# Patient Record
Sex: FEMALE | Race: OTHER | Employment: OTHER
[De-identification: names, ages, dates, MRNs, and addresses within clinical notes are randomized per-mention and may not be internally consistent; named-entity substitution may affect disease eponyms.]

---

## 2017-01-03 ENCOUNTER — OFFICE VISIT (OUTPATIENT)
Dept: FAMILY MEDICINE CLINIC | Facility: CLINIC | Age: 67
End: 2017-01-03

## 2017-01-03 VITALS
SYSTOLIC BLOOD PRESSURE: 144 MMHG | BODY MASS INDEX: 26.7 KG/M2 | OXYGEN SATURATION: 98 % | HEART RATE: 72 BPM | TEMPERATURE: 96.9 F | DIASTOLIC BLOOD PRESSURE: 88 MMHG | WEIGHT: 146 LBS

## 2017-01-03 DIAGNOSIS — F17.200 SMOKING: ICD-10-CM

## 2017-01-03 DIAGNOSIS — H91.91 HEARING PROBLEM, RIGHT: Primary | ICD-10-CM

## 2017-01-03 DIAGNOSIS — Z23 NEED FOR VACCINATION: ICD-10-CM

## 2017-01-03 DIAGNOSIS — I10 ESSENTIAL HYPERTENSION: ICD-10-CM

## 2017-01-03 DIAGNOSIS — J32.9 SINUSITIS, UNSPECIFIED CHRONICITY, UNSPECIFIED LOCATION: ICD-10-CM

## 2017-01-03 PROBLEM — H91.90 HEARING PROBLEM: Status: ACTIVE | Noted: 2017-01-03

## 2017-01-03 PROCEDURE — 99214 OFFICE O/P EST MOD 30 MIN: CPT | Performed by: FAMILY MEDICINE

## 2017-01-03 PROCEDURE — 90688 IIV4 VACCINE SPLT 0.5 ML IM: CPT | Performed by: FAMILY MEDICINE

## 2017-01-03 PROCEDURE — G0008 ADMIN INFLUENZA VIRUS VAC: HCPCS | Performed by: FAMILY MEDICINE

## 2017-01-03 PROCEDURE — 90670 PCV13 VACCINE IM: CPT | Performed by: FAMILY MEDICINE

## 2017-01-03 PROCEDURE — G0009 ADMIN PNEUMOCOCCAL VACCINE: HCPCS | Performed by: FAMILY MEDICINE

## 2017-01-03 RX ORDER — LORATADINE ORAL 5 MG/5ML
SOLUTION ORAL
Refills: 0 | COMMUNITY
Start: 2016-12-20 | End: 2017-01-03 | Stop reason: ALTCHOICE

## 2017-01-03 RX ORDER — AMLODIPINE BESYLATE 5 MG/1
5 TABLET ORAL DAILY
Qty: 30 TABLET | Refills: 3 | Status: SHIPPED | OUTPATIENT
Start: 2017-01-03 | End: 2017-06-19 | Stop reason: SDUPTHER

## 2017-02-14 ENCOUNTER — OFFICE VISIT (OUTPATIENT)
Dept: FAMILY MEDICINE CLINIC | Facility: CLINIC | Age: 67
End: 2017-02-14

## 2017-02-14 VITALS
TEMPERATURE: 97.5 F | WEIGHT: 144 LBS | HEIGHT: 62 IN | RESPIRATION RATE: 20 BRPM | HEART RATE: 76 BPM | SYSTOLIC BLOOD PRESSURE: 122 MMHG | DIASTOLIC BLOOD PRESSURE: 78 MMHG | BODY MASS INDEX: 26.5 KG/M2

## 2017-02-14 DIAGNOSIS — J32.9 SINUSITIS, UNSPECIFIED CHRONICITY, UNSPECIFIED LOCATION: ICD-10-CM

## 2017-02-14 DIAGNOSIS — F17.200 SMOKING: ICD-10-CM

## 2017-02-14 DIAGNOSIS — Z23 NEED FOR VACCINATION: ICD-10-CM

## 2017-02-14 DIAGNOSIS — I10 ESSENTIAL HYPERTENSION: Primary | ICD-10-CM

## 2017-02-14 PROCEDURE — 99214 OFFICE O/P EST MOD 30 MIN: CPT | Performed by: FAMILY MEDICINE

## 2017-02-14 RX ORDER — NICOTINE 21 MG/24HR
1 PATCH, TRANSDERMAL 24 HOURS TRANSDERMAL EVERY 24 HOURS
Qty: 30 PATCH | Refills: 3 | Status: SHIPPED | OUTPATIENT
Start: 2017-02-14 | End: 2018-04-04

## 2017-03-06 ENCOUNTER — HOSPITAL ENCOUNTER (OUTPATIENT)
Dept: CT IMAGING | Age: 67
Discharge: HOME | End: 2017-03-06
Payer: MEDICARE

## 2017-03-06 DIAGNOSIS — J30.9 ALLERGIC RHINITIS, CAUSE UNSPECIFIED: ICD-10-CM

## 2017-03-06 DIAGNOSIS — J32.4 CHRONIC PANSINUSITIS: ICD-10-CM

## 2017-03-06 DIAGNOSIS — J34.3 HYPERTROPHY OF BOTH INFERIOR NASAL TURBINATES: ICD-10-CM

## 2017-03-06 PROCEDURE — 70486 CT MAXILLOFACIAL W/O DYE: CPT

## 2017-06-19 DIAGNOSIS — I10 ESSENTIAL HYPERTENSION: ICD-10-CM

## 2017-06-19 RX ORDER — AMLODIPINE BESYLATE 5 MG/1
TABLET ORAL
Qty: 30 TABLET | Refills: 2 | Status: SHIPPED | OUTPATIENT
Start: 2017-06-19 | End: 2017-09-11 | Stop reason: SDUPTHER

## 2017-09-11 DIAGNOSIS — I10 ESSENTIAL HYPERTENSION: ICD-10-CM

## 2017-09-12 RX ORDER — AMLODIPINE BESYLATE 5 MG/1
TABLET ORAL
Qty: 30 TABLET | Refills: 1 | Status: SHIPPED | OUTPATIENT
Start: 2017-09-12 | End: 2017-11-10 | Stop reason: SDUPTHER

## 2017-11-10 DIAGNOSIS — I10 ESSENTIAL HYPERTENSION: ICD-10-CM

## 2017-11-10 RX ORDER — AMLODIPINE BESYLATE 5 MG/1
TABLET ORAL
Qty: 30 TABLET | Refills: 0 | Status: SHIPPED | OUTPATIENT
Start: 2017-11-10 | End: 2017-12-14 | Stop reason: SDUPTHER

## 2017-11-10 NOTE — TELEPHONE ENCOUNTER
Please Approve or Refuse.        Next Visit Date:  Visit date not found    Hemoglobin A1C (%)   Date Value   12/19/2016 5.3             ( goal A1C is < 7)   No results found for: LABMICR  LDL Cholesterol (mg/dL)   Date Value   12/19/2016 79       (goal LDL is <100)   BUN (mg/dL)   Date Value   05/11/2014 14     BP Readings from Last 3 Encounters:   02/14/17 122/78   01/03/17 (!) 144/88   12/20/16 (!) 158/79          (goal 120/80)        Patient Active Problem List:     Sinusitis     Essential hypertension     Hearing problem     Smoking

## 2017-11-12 ENCOUNTER — HOSPITAL ENCOUNTER (EMERGENCY)
Age: 67
Discharge: HOME | End: 2017-11-12
Attending: EMERGENCY MEDICINE
Payer: MEDICARE

## 2017-11-12 VITALS
DIASTOLIC BLOOD PRESSURE: 88 MMHG | RESPIRATION RATE: 16 BRPM | TEMPERATURE: 98.3 F | OXYGEN SATURATION: 99 % | HEART RATE: 77 BPM | SYSTOLIC BLOOD PRESSURE: 167 MMHG | WEIGHT: 145 LBS | BODY MASS INDEX: 26.68 KG/M2 | HEIGHT: 62 IN

## 2017-11-12 DIAGNOSIS — K08.89 PAIN, DENTAL: Primary | ICD-10-CM

## 2017-11-12 PROCEDURE — 99282 EMERGENCY DEPT VISIT SF MDM: CPT

## 2017-11-12 RX ORDER — IBUPROFEN 800 MG/1
800 TABLET ORAL EVERY 6 HOURS PRN
Qty: 20 TABLET | Refills: 0 | Status: SHIPPED | OUTPATIENT
Start: 2017-11-12 | End: 2021-11-02 | Stop reason: SDUPTHER

## 2017-11-12 RX ORDER — TRAMADOL HYDROCHLORIDE 50 MG/1
50 TABLET ORAL EVERY 6 HOURS PRN
Qty: 10 TABLET | Refills: 0 | Status: SHIPPED | OUTPATIENT
Start: 2017-11-12 | End: 2018-04-04 | Stop reason: ALTCHOICE

## 2017-11-12 RX ORDER — CLINDAMYCIN HYDROCHLORIDE 300 MG/1
300 CAPSULE ORAL 3 TIMES DAILY
Qty: 30 CAPSULE | Refills: 0 | Status: SHIPPED | OUTPATIENT
Start: 2017-11-12 | End: 2017-11-22

## 2017-11-12 ASSESSMENT — PAIN SCALES - GENERAL: PAINLEVEL_OUTOF10: 10

## 2017-11-12 ASSESSMENT — PAIN DESCRIPTION - LOCATION: LOCATION: MOUTH

## 2017-11-12 ASSESSMENT — PAIN DESCRIPTION - PAIN TYPE: TYPE: ACUTE PAIN

## 2017-11-12 NOTE — ED PROVIDER NOTES
[address]  eMERGENCY dEPARTMENT eNCOUnter      Pt Name: Pancho Griggs  MRN: [id number]  [place] 1950  Date of evaluation: 11/12/17      CHIEF COMPLAINT:   Chief Complaint   Patient presents with    Dental Pain     HISTORY OF PRESENT ILLNESS    Pancho Griggs is a 69 y.o. female who presents with complaints of left upper dental pain. Pt states in august she was told by a dentist she needed a root canal.  States she could not afford to have it done. Reports pain went away and returned on Friday. Pain has gradually worsened. States her left cheek became swollen last night. Admits to some associated nausea. Denies fever, chills, trouble breathing or swallowing, cp, sob, vomiting, abd pain. No other complaints. REVIEW OF SYSTEMS     Constitutional: Denies recent fever, chills. Eyes: No visual changes. Neck: No neck pain. Respiratory: Denies recent shortness of breath. Cardiac:  Denies recent chest pain. GI: denies any recent abdominal pain nausea or vomiting. Denies Blood in the stool or black tarry stools. : denies dysuria. Musculoskeletal: Denies focal weakness. Neurologic: denies headache or focal weakness. Skin:  Denies any rash. Negative in 10 essential Systems except as mentioned above and in the HPI. PAST MEDICAL HISTORY   PMH:  has a past medical history of Spondylolisthesis at L5-S1 level. none otherwise stated in nurses notes  Surgical History:  has a past surgical history that includes back surgery and Hysterectomy. none otherwise stated in nurses notes  Social History:  reports that she has been smoking. She does not have any smokeless tobacco history on file. She reports that she does not drink alcohol or use drugs. none otherwise stated in nurses notes  Family History: none otherwise stated in nurses notes  Psychiatric History: none otherwise stated in nurses notes    Allergies:is allergic to penicillins.       PHYSICAL EXAM     INITIAL

## 2018-04-04 ENCOUNTER — HOSPITAL ENCOUNTER (OUTPATIENT)
Dept: WOMENS IMAGING | Age: 68
Discharge: HOME | End: 2018-04-06
Payer: MEDICARE

## 2018-04-04 ENCOUNTER — HOSPITAL ENCOUNTER (OUTPATIENT)
Age: 68
Discharge: HOME | End: 2018-04-04
Payer: MEDICARE

## 2018-04-04 ENCOUNTER — OFFICE VISIT (OUTPATIENT)
Dept: FAMILY MEDICINE CLINIC | Age: 68
End: 2018-04-04
Payer: MEDICARE

## 2018-04-04 VITALS
DIASTOLIC BLOOD PRESSURE: 80 MMHG | WEIGHT: 149 LBS | SYSTOLIC BLOOD PRESSURE: 130 MMHG | TEMPERATURE: 96.7 F | HEART RATE: 69 BPM | BODY MASS INDEX: 27.42 KG/M2 | RESPIRATION RATE: 20 BRPM | HEIGHT: 62 IN

## 2018-04-04 DIAGNOSIS — I10 ESSENTIAL HYPERTENSION: ICD-10-CM

## 2018-04-04 DIAGNOSIS — J32.4 CHRONIC PANSINUSITIS: ICD-10-CM

## 2018-04-04 DIAGNOSIS — M81.0 AGE-RELATED OSTEOPOROSIS WITHOUT CURRENT PATHOLOGICAL FRACTURE: ICD-10-CM

## 2018-04-04 DIAGNOSIS — L29.9 SCALP ITCH: ICD-10-CM

## 2018-04-04 DIAGNOSIS — Z12.39 BREAST CANCER SCREENING: ICD-10-CM

## 2018-04-04 DIAGNOSIS — Z23 NEED FOR VACCINATION: ICD-10-CM

## 2018-04-04 DIAGNOSIS — Z12.11 COLON CANCER SCREENING: ICD-10-CM

## 2018-04-04 DIAGNOSIS — I10 ESSENTIAL HYPERTENSION: Primary | ICD-10-CM

## 2018-04-04 LAB
ANION GAP SERPL CALCULATED.3IONS-SCNC: 13 MMOL/L (ref 9–17)
BUN BLDV-MCNC: 10 MG/DL (ref 8–23)
BUN/CREAT BLD: ABNORMAL (ref 9–20)
CALCIUM SERPL-MCNC: 8.6 MG/DL (ref 8.6–10.4)
CHLORIDE BLD-SCNC: 103 MMOL/L (ref 98–107)
CO2: 24 MMOL/L (ref 20–31)
CREAT SERPL-MCNC: 0.59 MG/DL (ref 0.5–0.9)
GFR AFRICAN AMERICAN: >60 ML/MIN
GFR NON-AFRICAN AMERICAN: >60 ML/MIN
GFR SERPL CREATININE-BSD FRML MDRD: ABNORMAL ML/MIN/{1.73_M2}
GFR SERPL CREATININE-BSD FRML MDRD: ABNORMAL ML/MIN/{1.73_M2}
GLUCOSE BLD-MCNC: 103 MG/DL (ref 70–99)
POTASSIUM SERPL-SCNC: 4.1 MMOL/L (ref 3.7–5.3)
SODIUM BLD-SCNC: 140 MMOL/L (ref 135–144)

## 2018-04-04 PROCEDURE — 77063 BREAST TOMOSYNTHESIS BI: CPT

## 2018-04-04 PROCEDURE — 4004F PT TOBACCO SCREEN RCVD TLK: CPT | Performed by: FAMILY MEDICINE

## 2018-04-04 PROCEDURE — G8427 DOCREV CUR MEDS BY ELIG CLIN: HCPCS | Performed by: FAMILY MEDICINE

## 2018-04-04 PROCEDURE — G0009 ADMIN PNEUMOCOCCAL VACCINE: HCPCS | Performed by: FAMILY MEDICINE

## 2018-04-04 PROCEDURE — 1123F ACP DISCUSS/DSCN MKR DOCD: CPT | Performed by: FAMILY MEDICINE

## 2018-04-04 PROCEDURE — 90715 TDAP VACCINE 7 YRS/> IM: CPT | Performed by: FAMILY MEDICINE

## 2018-04-04 PROCEDURE — 90732 PPSV23 VACC 2 YRS+ SUBQ/IM: CPT | Performed by: FAMILY MEDICINE

## 2018-04-04 PROCEDURE — 80048 BASIC METABOLIC PNL TOTAL CA: CPT

## 2018-04-04 PROCEDURE — 99214 OFFICE O/P EST MOD 30 MIN: CPT | Performed by: FAMILY MEDICINE

## 2018-04-04 PROCEDURE — G8419 CALC BMI OUT NRM PARAM NOF/U: HCPCS | Performed by: FAMILY MEDICINE

## 2018-04-04 PROCEDURE — 90471 IMMUNIZATION ADMIN: CPT | Performed by: FAMILY MEDICINE

## 2018-04-04 PROCEDURE — G8400 PT W/DXA NO RESULTS DOC: HCPCS | Performed by: FAMILY MEDICINE

## 2018-04-04 PROCEDURE — 3014F SCREEN MAMMO DOC REV: CPT | Performed by: FAMILY MEDICINE

## 2018-04-04 PROCEDURE — 36415 COLL VENOUS BLD VENIPUNCTURE: CPT

## 2018-04-04 PROCEDURE — 4040F PNEUMOC VAC/ADMIN/RCVD: CPT | Performed by: FAMILY MEDICINE

## 2018-04-04 PROCEDURE — 1090F PRES/ABSN URINE INCON ASSESS: CPT | Performed by: FAMILY MEDICINE

## 2018-04-04 PROCEDURE — 3017F COLORECTAL CA SCREEN DOC REV: CPT | Performed by: FAMILY MEDICINE

## 2018-04-04 ASSESSMENT — ENCOUNTER SYMPTOMS
SHORTNESS OF BREATH: 0
COUGH: 0
CONSTIPATION: 0
PHOTOPHOBIA: 0
SINUS PAIN: 0
ABDOMINAL PAIN: 0
BACK PAIN: 0
EYE REDNESS: 0
STRIDOR: 0
COLOR CHANGE: 0
WHEEZING: 0
NAUSEA: 0
DIARRHEA: 0
RHINORRHEA: 0

## 2018-04-04 ASSESSMENT — PATIENT HEALTH QUESTIONNAIRE - PHQ9
SUM OF ALL RESPONSES TO PHQ9 QUESTIONS 1 & 2: 0
1. LITTLE INTEREST OR PLEASURE IN DOING THINGS: 0
SUM OF ALL RESPONSES TO PHQ QUESTIONS 1-9: 0
2. FEELING DOWN, DEPRESSED OR HOPELESS: 0

## 2018-04-06 DIAGNOSIS — Z12.11 COLON CANCER SCREENING: ICD-10-CM

## 2018-04-06 LAB
CONTROL: PRESENT
HEMOCCULT STL QL: POSITIVE

## 2018-04-06 PROCEDURE — 82274 ASSAY TEST FOR BLOOD FECAL: CPT | Performed by: FAMILY MEDICINE

## 2018-04-09 DIAGNOSIS — R92.8 ABNORMAL MAMMOGRAM: ICD-10-CM

## 2018-04-09 DIAGNOSIS — R19.5 POSITIVE FIT (FECAL IMMUNOCHEMICAL TEST): Primary | ICD-10-CM

## 2018-04-13 ENCOUNTER — HOSPITAL ENCOUNTER (OUTPATIENT)
Dept: WOMENS IMAGING | Age: 68
Discharge: HOME | End: 2018-04-15
Payer: MEDICARE

## 2018-04-13 DIAGNOSIS — R92.8 ABNORMAL MAMMOGRAM: ICD-10-CM

## 2018-04-13 PROCEDURE — 76642 ULTRASOUND BREAST LIMITED: CPT

## 2018-04-13 PROCEDURE — G0279 TOMOSYNTHESIS, MAMMO: HCPCS

## 2018-11-26 ENCOUNTER — HOSPITAL ENCOUNTER (EMERGENCY)
Age: 68
Discharge: HOME | End: 2018-11-26
Attending: EMERGENCY MEDICINE
Payer: MEDICARE

## 2018-11-26 ENCOUNTER — APPOINTMENT (OUTPATIENT)
Dept: GENERAL RADIOLOGY | Age: 68
End: 2018-11-26
Payer: MEDICARE

## 2018-11-26 VITALS
RESPIRATION RATE: 16 BRPM | HEART RATE: 68 BPM | BODY MASS INDEX: 26.68 KG/M2 | HEIGHT: 62 IN | SYSTOLIC BLOOD PRESSURE: 138 MMHG | WEIGHT: 145 LBS | DIASTOLIC BLOOD PRESSURE: 67 MMHG | OXYGEN SATURATION: 98 % | TEMPERATURE: 98 F

## 2018-11-26 DIAGNOSIS — M54.32 SCIATICA OF LEFT SIDE: Primary | ICD-10-CM

## 2018-11-26 PROCEDURE — 6360000002 HC RX W HCPCS: Performed by: PHYSICIAN ASSISTANT

## 2018-11-26 PROCEDURE — 99283 EMERGENCY DEPT VISIT LOW MDM: CPT

## 2018-11-26 PROCEDURE — 73502 X-RAY EXAM HIP UNI 2-3 VIEWS: CPT

## 2018-11-26 PROCEDURE — 96372 THER/PROPH/DIAG INJ SC/IM: CPT

## 2018-11-26 RX ORDER — KETOROLAC TROMETHAMINE 30 MG/ML
60 INJECTION, SOLUTION INTRAMUSCULAR; INTRAVENOUS ONCE
Status: COMPLETED | OUTPATIENT
Start: 2018-11-26 | End: 2018-11-26

## 2018-11-26 RX ORDER — DEXAMETHASONE SODIUM PHOSPHATE 4 MG/ML
8 INJECTION, SOLUTION INTRA-ARTICULAR; INTRALESIONAL; INTRAMUSCULAR; INTRAVENOUS; SOFT TISSUE ONCE
Status: COMPLETED | OUTPATIENT
Start: 2018-11-26 | End: 2018-11-26

## 2018-11-26 RX ORDER — TRAMADOL HYDROCHLORIDE 50 MG/1
50 TABLET ORAL EVERY 6 HOURS PRN
Qty: 12 TABLET | Refills: 0 | Status: SHIPPED | OUTPATIENT
Start: 2018-11-26 | End: 2018-11-29

## 2018-11-26 RX ORDER — CYCLOBENZAPRINE HCL 10 MG
10 TABLET ORAL 3 TIMES DAILY PRN
Qty: 12 TABLET | Refills: 0 | Status: SHIPPED | OUTPATIENT
Start: 2018-11-26 | End: 2020-02-10

## 2018-11-26 RX ORDER — PREDNISONE 20 MG/1
40 TABLET ORAL DAILY
Qty: 10 TABLET | Refills: 0 | Status: SHIPPED | OUTPATIENT
Start: 2018-11-26 | End: 2018-12-01

## 2018-11-26 RX ADMIN — KETOROLAC TROMETHAMINE 60 MG: 30 INJECTION, SOLUTION INTRAMUSCULAR at 14:36

## 2018-11-26 RX ADMIN — DEXAMETHASONE SODIUM PHOSPHATE 8 MG: 4 INJECTION, SOLUTION INTRAMUSCULAR; INTRAVENOUS at 14:37

## 2018-11-26 ASSESSMENT — PAIN DESCRIPTION - PAIN TYPE: TYPE: ACUTE PAIN

## 2018-11-26 ASSESSMENT — PAIN SCALES - GENERAL
PAINLEVEL_OUTOF10: 8
PAINLEVEL_OUTOF10: 8

## 2018-11-26 NOTE — ED PROVIDER NOTES
Pulmonary/Chest: Effort normal and breath sounds normal. No respiratory distress. No wheezes. No rales. No chest tenderness. Abdomen:  Soft, nontender   Musculoskeletal: Normal range of motion. Mild paraspinal muscle tenderness over left lower back and buttock, no midline tenderness, no step-off deformity, no swelling, no rashes, pt able to stand on toes and heels. Pt ambulatory. Neurological: Alert and oriented to person, place, and time. GCS score is 15.  5/5 strength in bilateral lower extremities with sensation to light touch intact. 2/2 dp and pt pulses. Skin: Skin is warm and dry. No rash noted. No erythema. No pallor. DIAGNOSTIC RESULTS   RADIOLOGY:   All plain film, CT, MRI, and formal ultrasound images (except ED bedside ultrasound) are read by the radiologist, see reports below, unless otherwise noted in MDM or here. XR HIP LEFT (2-3 VIEWS)   Final Result   Normal appearance of the left hip                     LABS:  Labs Reviewed - No data to display    All other labs were within normal range or not returned as of this dictation. EMERGENCY DEPARTMENT COURSE and DIFFERENTIAL DIAGNOSIS/MDM:   Vitals:    Vitals:    11/26/18 1342   BP: (!) 152/62   Pulse: 77   Resp: 16   Temp: 98 °F (36.7 °C)   SpO2: 98%   Weight: 145 lb (65.8 kg)   Height: 5' 2\" (1.575 m)           ED MEDICATIONS  Orders Placed This Encounter   Medications    ketorolac (TORADOL) injection 60 mg    dexamethasone (DECADRON) injection 8 mg    predniSONE (DELTASONE) 20 MG tablet     Sig: Take 2 tablets by mouth daily for 5 days     Dispense:  10 tablet     Refill:  0    cyclobenzaprine (FLEXERIL) 10 MG tablet     Sig: Take 1 tablet by mouth 3 times daily as needed for Muscle spasms     Dispense:  12 tablet     Refill:  0    traMADol (ULTRAM) 50 MG tablet     Sig: Take 1 tablet by mouth every 6 hours as needed for Pain for up to 3 days. Intended supply: 3 days. Take lowest dose possible to manage pain.      Dispense: 12 tablet     Refill:  0         CONSULTS:  None    PROCEDURES:  None      Left lower back and buttock pain that is radiating down the leg. Electrical sensation. No numbness. No neuro deficits. No red flag symptoms. Suspect sciatica. Will give decadron and Toradol. Will get xray of hip due to groin pain. Left hip xray is unremarkable. Suspect sciatica is cause of pain. Will discharge home with flexeril, tramadol, and prednisone. Sciatica exercises and instructions provided. Follow up with PCP. Discussed results and plan with the pt. They expressed appropriate understanding. Pt given close follow up, supportive care instructions and strict return instructions at the bedside. Patient instructed to return to the emergency room if symptoms worsen, return, or any other concern right away which is agreed. Follow up with PCP in 2-3 days for re-evaluation. The patient presents with low back pain without signs of spinal cord compression, cauda equina syndrome, infection, aneurysm or other serious etiology. The patient is neurologically intact. Given the extremely low risk of these diagnoses further testing and evaluation for these possibilities does not appear to be indicated at this time. The patient has been instructed to return if the symptoms worsen or change in any way.          FINAL IMPRESSION      1.  Sciatica of left side          DISPOSITION/PLAN   DISPOSITION     PATIENT REFERRED TO:  Hayden Timmons MD  [address] [facility] [address]  [address] [facility] [address] [phone]    Call       [facility] ED  [address]  [address] [facility]  [phone]    If symptoms worsen      DISCHARGE MEDICATIONS:  New Prescriptions    CYCLOBENZAPRINE (FLEXERIL) 10 MG TABLET    Take 1 tablet by mouth 3 times daily as needed for Muscle spasms    PREDNISONE (DELTASONE) 20 MG TABLET    Take 2 tablets by mouth daily for 5 days    TRAMADOL (ULTRAM) 50 MG TABLET    Take 1 tablet by mouth every 6

## 2018-11-27 ENCOUNTER — TELEPHONE (OUTPATIENT)
Dept: FAMILY MEDICINE CLINIC | Age: 68
End: 2018-11-27

## 2019-10-30 ENCOUNTER — TELEPHONE (OUTPATIENT)
Dept: FAMILY MEDICINE CLINIC | Age: 69
End: 2019-10-30

## 2020-02-10 ENCOUNTER — APPOINTMENT (OUTPATIENT)
Dept: CT IMAGING | Age: 70
End: 2020-02-10
Payer: MEDICARE

## 2020-02-10 ENCOUNTER — TELEPHONE (OUTPATIENT)
Dept: FAMILY MEDICINE CLINIC | Age: 70
End: 2020-02-10

## 2020-02-10 ENCOUNTER — HOSPITAL ENCOUNTER (EMERGENCY)
Age: 70
Discharge: HOME | End: 2020-02-10
Attending: EMERGENCY MEDICINE
Payer: MEDICARE

## 2020-02-10 VITALS
BODY MASS INDEX: 26.68 KG/M2 | HEART RATE: 82 BPM | OXYGEN SATURATION: 98 % | TEMPERATURE: 98.6 F | RESPIRATION RATE: 18 BRPM | SYSTOLIC BLOOD PRESSURE: 198 MMHG | HEIGHT: 62 IN | DIASTOLIC BLOOD PRESSURE: 93 MMHG | WEIGHT: 145 LBS

## 2020-02-10 PROCEDURE — 70486 CT MAXILLOFACIAL W/O DYE: CPT

## 2020-02-10 PROCEDURE — 99284 EMERGENCY DEPT VISIT MOD MDM: CPT

## 2020-02-10 RX ORDER — CETIRIZINE HYDROCHLORIDE 10 MG/1
10 TABLET ORAL DAILY
Qty: 10 TABLET | Refills: 0 | Status: SHIPPED | OUTPATIENT
Start: 2020-02-10 | End: 2020-02-13 | Stop reason: SDUPTHER

## 2020-02-10 ASSESSMENT — PAIN DESCRIPTION - LOCATION
LOCATION_2: FACE
LOCATION: EAR

## 2020-02-10 ASSESSMENT — PAIN DESCRIPTION - INTENSITY: RATING_2: 4

## 2020-02-10 ASSESSMENT — PAIN DESCRIPTION - DESCRIPTORS
DESCRIPTORS_2: ACHING;SORE
DESCRIPTORS: ACHING

## 2020-02-10 ASSESSMENT — PAIN DESCRIPTION - ORIENTATION
ORIENTATION: LEFT;RIGHT
ORIENTATION_2: LEFT

## 2020-02-10 ASSESSMENT — PAIN SCALES - GENERAL: PAINLEVEL_OUTOF10: 8

## 2020-02-10 NOTE — ED PROVIDER NOTES
[address]  Emergency Department  Independent Attestation     Pt Name: Robbie Aguero  MRN: [id number]  [place] 1950  Date of evaluation: 2/10/20       Robbie Aguero is a 68 y.o. female who presents with Tinnitus (started 2 weeks ago after being diagnosed with the flu); Facial Pain (x1 week); and Fall (1 week ago)      I was personally available for consultation in the Emergency Department.     Sharona Ralph DO  Attending Emergency Physician  [address]      (Please note that portions of this note were completed with a voice recognition program.  Efforts were made to edit the dictations but occasionally words are mis-transcribed.)        Sharona Ralph DO  02/10/20 1129

## 2020-02-10 NOTE — TELEPHONE ENCOUNTER
[facility] ([facility]) ED Follow up Call    Reason for ED visit: contusion of face and tinnitis      Hi Josefina Feliz , this is [place] from Dr. Geri Opitz office, just calling to see how you are doing after your recent ED visit. Did you receive discharge instructions? Yes  Do you understand the discharge instructions? Yes  Did the ED give you any new prescriptions? Yes  Were you able to fill your prescriptions? Yes      Do you have one of our red, yellow and green  Zone sheets that help you to determine when you should go to the ED? Not Applicable      Do you need or want to make a follow up appt with your PCP? Yes    Do you have any further needs in the home i.e. Equipment? No        FU appts/Provider:    No future appointments.

## 2020-02-10 NOTE — ED PROVIDER NOTES
[address]  eMERGENCY dEPARTMENT eNCOUnter      Pt Name: Christie Aguilera  MRN: [id number]  [place] 1950  Date of evaluation: 2/10/20      CHIEF COMPLAINT:   Chief Complaint   Patient presents with    Tinnitus     started 2 weeks ago after being diagnosed with the flu    Facial Pain     x1 week    Fall     1 week ago     Ana0 Ned Fletcher is a 68 y.o. female who presents with complaints of tinnitus, facial pain, and fall. Pt reports 1 week ago she tripped while in [place] and injured head. Reports she had left sided facial swelling, bruising, and tenderness. Denies loc or emesis. States symptoms have improved but she is still having some facial pain. Additionally, pt reports bilateral tinnitus that began 2 weeks ago after getting diagnosed with influenza. No ear drainage. Pt denies headache, lightheadedness, dizziness, vision changes, neck pain, chest pain, sob, nausea, emesis, abd pain, numbness. She is not on blood thinners. No other complaints. REVIEW OF SYSTEMS     Fall  Facial pain  Bruising  Swelling  Tinnitus     Negative in 10 essential Systems except as mentioned above and in the HPI. PAST MEDICAL HISTORY   PMH:  has a past medical history of Spondylolisthesis at L5-S1 level. none otherwise stated from nurses notes  Surgical History:  has a past surgical history that includes back surgery and Hysterectomy. none otherwise stated from nurses notes  Social History:  reports that she has been smoking cigarettes. She has never used smokeless tobacco. She reports that she does not drink alcohol or use drugs. , lives at home with others  Family History: none  Psychiatric History: none    Allergies:is allergic to influenza vaccines and penicillins.     PHYSICAL EXAM     INITIAL VITALS: BP (!) 198/93   Pulse 82   Temp 98.6 °F (37 °C) (Oral)   Resp 18   Ht 5' 2\" (1.575 m)   Wt 145 lb (65.8 kg)   SpO2 98%   BMI 26.52 kg/m²   [facility] [place] [address] [place] [address] [address], [address] [place] [address]  [address] [place] [address]  [phone]            DISCHARGE MEDICATIONS:  Discharge Medication List as of 2/10/2020 12:09 PM      START taking these medications    Details   cetirizine (ZYRTEC) 10 MG tablet Take 1 tablet by mouth daily for 10 days, Disp-10 tablet, R-0Print             (Please note that portions of this note were completed with a voice recognition program.  Efforts were made to edit the dictations but occasionally words are mis-transcribed.)       Momo Valdes PA-C  02/10/20 [id number]

## 2020-02-13 ENCOUNTER — OFFICE VISIT (OUTPATIENT)
Dept: FAMILY MEDICINE CLINIC | Age: 70
End: 2020-02-13
Payer: MEDICARE

## 2020-02-13 VITALS
WEIGHT: 147 LBS | HEART RATE: 71 BPM | BODY MASS INDEX: 26.89 KG/M2 | DIASTOLIC BLOOD PRESSURE: 78 MMHG | SYSTOLIC BLOOD PRESSURE: 138 MMHG | OXYGEN SATURATION: 97 %

## 2020-02-13 PROBLEM — J01.01 ACUTE RECURRENT MAXILLARY SINUSITIS: Status: ACTIVE | Noted: 2020-02-13

## 2020-02-13 PROBLEM — R19.5 POSITIVE FIT (FECAL IMMUNOCHEMICAL TEST): Status: ACTIVE | Noted: 2020-02-13

## 2020-02-13 PROBLEM — N39.3 STRESS INCONTINENCE OF URINE: Status: ACTIVE | Noted: 2020-02-13

## 2020-02-13 PROCEDURE — G8510 SCR DEP NEG, NO PLAN REQD: HCPCS | Performed by: FAMILY MEDICINE

## 2020-02-13 PROCEDURE — 99214 OFFICE O/P EST MOD 30 MIN: CPT | Performed by: FAMILY MEDICINE

## 2020-02-13 PROCEDURE — 3288F FALL RISK ASSESSMENT DOCD: CPT | Performed by: FAMILY MEDICINE

## 2020-02-13 RX ORDER — LORATADINE 10 MG/1
10 CAPSULE, LIQUID FILLED ORAL DAILY
Qty: 60 CAPSULE | Refills: 1 | Status: SHIPPED | OUTPATIENT
Start: 2020-02-13 | End: 2020-07-06 | Stop reason: SDUPTHER

## 2020-02-13 RX ORDER — LISINOPRIL 10 MG/1
10 TABLET ORAL DAILY
Qty: 60 TABLET | Refills: 1 | Status: SHIPPED | OUTPATIENT
Start: 2020-02-13 | End: 2020-06-17

## 2020-02-13 RX ORDER — CETIRIZINE HYDROCHLORIDE 10 MG/1
10 TABLET ORAL DAILY
Qty: 10 TABLET | Refills: 0 | Status: SHIPPED | OUTPATIENT
Start: 2020-02-13 | End: 2020-02-23

## 2020-02-13 RX ORDER — MIRABEGRON 50 MG/1
50 TABLET, FILM COATED, EXTENDED RELEASE ORAL DAILY
Qty: 90 TABLET | Refills: 1 | Status: SHIPPED | OUTPATIENT
Start: 2020-02-13 | End: 2020-07-06 | Stop reason: SDUPTHER

## 2020-02-13 RX ORDER — RISEDRONATE SODIUM 35 MG/1
35 TABLET, DELAYED RELEASE ORAL WEEKLY
Qty: 12 TABLET | Refills: 2 | Status: SHIPPED | OUTPATIENT
Start: 2020-02-13 | End: 2020-02-26 | Stop reason: DRUGHIGH

## 2020-02-13 ASSESSMENT — ENCOUNTER SYMPTOMS
BACK PAIN: 0
ANAL BLEEDING: 0
SHORTNESS OF BREATH: 0
DIARRHEA: 0
COLOR CHANGE: 0
ABDOMINAL PAIN: 0
CHEST TIGHTNESS: 0
CONSTIPATION: 0
BLOOD IN STOOL: 0
WHEEZING: 0
COUGH: 0
ABDOMINAL DISTENTION: 0
SORE THROAT: 0
RECTAL PAIN: 0
SINUS PRESSURE: 1
RHINORRHEA: 1

## 2020-02-13 ASSESSMENT — PATIENT HEALTH QUESTIONNAIRE - PHQ9
SUM OF ALL RESPONSES TO PHQ QUESTIONS 1-9: 1
1. LITTLE INTEREST OR PLEASURE IN DOING THINGS: 0
SUM OF ALL RESPONSES TO PHQ9 QUESTIONS 1 & 2: 1
SUM OF ALL RESPONSES TO PHQ QUESTIONS 1-9: 1
2. FEELING DOWN, DEPRESSED OR HOPELESS: 1

## 2020-02-13 NOTE — PROGRESS NOTES
Chief Complaint   Patient presents with    Follow-Up from [facility]  here today for follow up on chronic medical problems, go over labs and/or diagnostic studies, and medication refills. Follow-Up from [facility] Er)  Patient not seen in the office since last 2 years. HPI: Patient is here for follow-up on ER visit, for ringing in her both ears. Patient reports she was in [place] had fall and hit her head, the next day she noticed big bruise on her face and her left eye turned black. She did not go to the ER there and while coming back she noticed ringing in her ears and sinus congestion. She had CT facial bones done, which showed soft tissue injury, but no orbital fractures. Patient reports she was given Zyrtec and Flonase, which is helping. She was also referred to ENT for right ear hearing problems. Hypertension uncontrolled in ER it was 198/100, patient has stopped taking her medications. She was started on amlodipine and reports that was making her dizzy and was having headaches. Bruises improved on face, denies any tenderness or pain. She is due for DEXA scan has stopped taking Fosamax. Patient has positive fit test, she was referred to GI, she never followed. Reports she has history of hemorrhoids and she does not want to go for colonoscopy before doing another test.      History of stress incontinence stable on myrbetiq. /78 (Site: Right Upper Arm, Position: Sitting, Cuff Size: Medium Adult) Comment (Cuff Size): manual  Pulse 71   Wt 147 lb (66.7 kg)   SpO2 97%   BMI 26.89 kg/m²    Body mass index is 26.89 kg/m². Wt Readings from Last 3 Encounters:   02/13/20 147 lb (66.7 kg)   02/10/20 145 lb (65.8 kg)   11/26/18 145 lb (65.8 kg)        [x]Negative depression screening.   PHQ Scores 2/13/2020 4/4/2018   PHQ2 Score 1 0   PHQ9 Score 1 0      []1-4 = Minimal depression   []5-9 = Milddepression   []10-14 = Moderate depression   []15-19 = Moderately severe depression   []20-27 = Severe depression    Discussed testing with the patient and all questions fully answered.     Orders Only on 04/06/2018   Component Date Value Ref Range Status    OCCULT BLOOD FECAL 04/06/2018 positive   Final    Control 04/06/2018 present   Final         Most recent labs reviewed:     Lab Results   Component Value Date    WBC 7.9 05/12/2014    HGB 11.6 (L) 05/12/2014    HCT 32.7 (L) 05/12/2014    MCV 94.0 05/12/2014     05/12/2014       @BRIEFLAB(NA,K,CL,CO2,BUN,CREATININE,GLUCOSE,CALCIUM)@     No results found for: ALT, AST, GGT, ALKPHOS, BILITOT    No results found for: TSHFT4, TSH    Lab Results   Component Value Date    CHOL 154 12/19/2016     Lab Results   Component Value Date    TRIG 143 12/19/2016     Lab Results   Component Value Date    HDL 46 12/19/2016     Lab Results   Component Value Date    LDLCHOLESTEROL 79 12/19/2016     Lab Results   Component Value Date    VLDL NOT REPORTED 12/19/2016     Lab Results   Component Value Date    CHOLHDLRATIO 3.3 12/19/2016       Lab Results   Component Value Date    LABA1C 5.3 12/19/2016       No results found for: IBUABOSM24    No results found for: FOLATE    No results found for: IRON, TIBC, FERRITIN    No results found for: VITD25          Current Outpatient Medications   Medication Sig Dispense Refill    loratadine (CLARITIN) 10 MG capsule Take 1 capsule by mouth daily 60 capsule 1    cetirizine (ZYRTEC) 10 MG tablet Take 1 tablet by mouth daily for 10 days 10 tablet 0    Risedronate Sodium 35 MG TBEC Take 35 mg by mouth once a week 12 tablet 2    MYRBETRIQ 50 MG TB24 Take 50 mg by mouth daily 90 tablet 1    lisinopril (PRINIVIL;ZESTRIL) 10 MG tablet Take 1 tablet by mouth daily 60 tablet 1    ibuprofen (ADVIL;MOTRIN) 800 MG tablet Take 1 tablet by mouth every 6 hours as needed for Pain (Patient not taking: Reported on 2/13/2020) 20 tablet 0    sodium chloride (OCEAN) 0.65 % nasal Rhinorrhea is clear. Right Turbinates: Not enlarged. Left Turbinates: Not enlarged. Right Sinus: Maxillary sinus tenderness and frontal sinus tenderness present. Left Sinus: Maxillary sinus tenderness and frontal sinus tenderness present. Eyes:      General: Lids are normal. No visual field deficit. Extraocular Movements: Extraocular movements intact. Conjunctiva/sclera: Conjunctivae normal.   Cardiovascular:      Rate and Rhythm: Normal rate and regular rhythm. Heart sounds: Normal heart sounds, S1 normal and S2 normal.   Pulmonary:      Effort: Pulmonary effort is normal.      Breath sounds: Normal breath sounds. No decreased breath sounds, wheezing or rhonchi. Abdominal:      General: Abdomen is flat. Bowel sounds are normal.      Palpations: Abdomen is soft. Tenderness: There is no abdominal tenderness. Musculoskeletal: Normal range of motion. Skin:     General: Skin is warm. Coloration: Skin is not ashen or cyanotic. Neurological:      Mental Status: She is alert and oriented to person, place, and time. Cranial Nerves: Cranial nerves are intact. Psychiatric:         Attention and Perception: Attention normal.         Mood and Affect: Mood and affect normal.             ASSESSMENT AND PLAN      1. Essential hypertension  Uncontrolled, started on lisinopril 10 mg, nurse visit in 1 week for BP check. - Comprehensive Metabolic Panel; Future  - CBC; Future  - lisinopril (PRINIVIL;ZESTRIL) 10 MG tablet; Take 1 tablet by mouth daily  Dispense: 60 tablet; Refill: 1    2. Acute recurrent maxillary sinusitis  Continue Zyrtec and Flonase. Follow-up with ENT    3. Contusion of face, sequela  Bruise has improved    4. Age-related osteoporosis without current pathological fracture  Refill medication DEXA scan ordered  - Risedronate Sodium 35 MG TBEC; Take 35 mg by mouth once a week  Dispense: 12 tablet; Refill: 2  - DEXA BONE DENSITY AXIAL SKELETON; Future    5. Positive FIT (fecal immunochemical test)  Cologuard ordered discussed with patient if that is positive he had to go for colonoscopy    6. Stress incontinence of urine  Stable on current treatment  - MYRBETRIQ 50 MG TB24; Take 50 mg by mouth daily  Dispense: 90 tablet; Refill: 1    7. Encounter for screening for osteoporosis    - DEXA BONE DENSITY AXIAL SKELETON; Future    8. Screening for colon cancer    - Cologuard (For External Results Only); Future      Orders Placed This Encounter   Procedures    Cologuard (For External Results Only)     This test is performed by an external laboratory and is used for result attachment only. It is required that this order requisition be faxed to: [facility] @ [phone]. See [email or web address] for further information. Standing Status:   Future     Standing Expiration Date:   2/13/2021    DEXA BONE DENSITY AXIAL SKELETON     Standing Status:   Future     Standing Expiration Date:   2/13/2021     Order Specific Question:   Reason for exam:     Answer:   screening ostoporosis    Comprehensive Metabolic Panel     Standing Status:   Future     Standing Expiration Date:   2/13/2021    CBC     Standing Status:   Future     Standing Expiration Date:   2/13/2021         Medications Discontinued During This Encounter   Medication Reason    Loratadine (CLARITIN) 10 MG CAPS REORDER    cetirizine (ZYRTEC) 10 MG tablet REORDER    Risedronate Sodium 35 MG TBEC REORDER    MYRBETRIQ 50 MG TB24 REORDER       Reesa Connell received counseling on the following healthy behaviors: nutrition, exercise and medication adherence  Reviewed prior labs and health maintenance  Continue current medications, diet and exercise. Discussed use, benefit, and side effects of prescribed medications. Barriers to medication compliance addressed. Patient given educational materials - see patient instructions  Was a self-tracking handout given in paper form or via [employer]ett?  Yes    Requested Prescriptions     Signed Prescriptions Disp Refills    loratadine (CLARITIN) 10 MG capsule 60 capsule 1     Sig: Take 1 capsule by mouth daily    cetirizine (ZYRTEC) 10 MG tablet 10 tablet 0     Sig: Take 1 tablet by mouth daily for 10 days    Risedronate Sodium 35 MG TBEC 12 tablet 2     Sig: Take 35 mg by mouth once a week    MYRBETRIQ 50 MG TB24 90 tablet 1     Sig: Take 50 mg by mouth daily    lisinopril (PRINIVIL;ZESTRIL) 10 MG tablet 60 tablet 1     Sig: Take 1 tablet by mouth daily       All patient questions answered. Patient voiced understanding. Quality Measures    Body mass index is 26.89 kg/m². Elevated. Weight control planned discussed Healthy diet and regular exercise. BP: 138/78. Blood pressure is normal. Treatment plan consists of No treatment change needed. Fall Risk 2/13/2020   2 or more falls in past year? yes   Fall with injury in past year? yes     The patient has accidental   history of falls. I did , complete a risk assessment for falls. A plan of care for falls in-office gait and balance testing performed using The Timed Up and Go Test was negative for increased falls risk- no further intervention is currently indicated, No Treatment plan indicated    Lab Results   Component Value Date    LDLCHOLESTEROL 79 12/19/2016    (goal LDL reduction with dx if diabetes is 50% LDL reduction)    PHQ Scores 2/13/2020 4/4/2018   PHQ2 Score 1 0   PHQ9 Score 1 0     Interpretation of Total Score Depression Severity: 1-4 = Minimal depression, 5-9 = Mild depression, 10-14 = Moderate depression, 15-19 = Moderately severe depression, 20-27 = Severe depression      The patient'spast medical, surgical, social, and family history as well as her   current medications and allergies were reviewed as documented in today's encounter. Medications, labs, diagnostic studies, consultations andfollow-up as documented in this encounter.     Return in about 2 months (around 4/13/2020) for lab work, dexa, htn. .    Patient wasseen with total face to face time of 25 minutes. More than 50% of this visit was counseling and education. Future Appointments   Date Time Provider Adin Gonzalez   2/20/2020 10:00 AM SCHEDULE, YFN Miguel [facility]   4/13/2020 10:00 AM Emeka Griffin MD [facility]     This note was completed by using the assistance of a speech-recognition program. However, inadvertent computerized transcription errors may be present. Althoughevery effort was made to ensure accuracy, no guarantees can be provided that every mistake has been identified and corrected by editing.   Electronically signed by Emeka Griffin MD on 2/13/2020  12:59 PM

## 2020-02-14 ENCOUNTER — HOSPITAL ENCOUNTER (OUTPATIENT)
Age: 70
Discharge: HOME | End: 2020-02-14
Payer: MEDICARE

## 2020-02-14 LAB
ALBUMIN SERPL-MCNC: 3.9 G/DL (ref 3.5–5.2)
ALBUMIN/GLOBULIN RATIO: ABNORMAL (ref 1–2.5)
ALP BLD-CCNC: 91 U/L (ref 35–104)
ALT SERPL-CCNC: 24 U/L (ref 5–33)
ANION GAP SERPL CALCULATED.3IONS-SCNC: 8 MMOL/L (ref 9–17)
AST SERPL-CCNC: 23 U/L
BILIRUB SERPL-MCNC: 0.28 MG/DL (ref 0.3–1.2)
BUN BLDV-MCNC: 11 MG/DL (ref 8–23)
BUN/CREAT BLD: ABNORMAL (ref 9–20)
CALCIUM SERPL-MCNC: 8.2 MG/DL (ref 8.6–10.4)
CHLORIDE BLD-SCNC: 104 MMOL/L (ref 98–107)
CO2: 25 MMOL/L (ref 20–31)
CREAT SERPL-MCNC: 0.61 MG/DL (ref 0.5–0.9)
GFR AFRICAN AMERICAN: >60 ML/MIN
GFR NON-AFRICAN AMERICAN: >60 ML/MIN
GFR SERPL CREATININE-BSD FRML MDRD: ABNORMAL ML/MIN/{1.73_M2}
GFR SERPL CREATININE-BSD FRML MDRD: ABNORMAL ML/MIN/{1.73_M2}
GLUCOSE BLD-MCNC: 110 MG/DL (ref 70–99)
HCT VFR BLD CALC: 39.5 % (ref 36–46)
HEMOGLOBIN: 13.5 G/DL (ref 12–16)
MCH RBC QN AUTO: 32.4 PG (ref 26–34)
MCHC RBC AUTO-ENTMCNC: 34.2 G/DL (ref 31–37)
MCV RBC AUTO: 94.6 FL (ref 80–100)
NRBC AUTOMATED: NORMAL PER 100 WBC
PDW BLD-RTO: 12.9 % (ref 11.5–14.9)
PLATELET # BLD: 202 K/UL (ref 150–450)
PMV BLD AUTO: 8.6 FL (ref 6–12)
POTASSIUM SERPL-SCNC: 4.2 MMOL/L (ref 3.7–5.3)
RBC # BLD: 4.18 M/UL (ref 4–5.2)
SODIUM BLD-SCNC: 137 MMOL/L (ref 135–144)
TOTAL PROTEIN: 6.5 G/DL (ref 6.4–8.3)
WBC # BLD: 5.4 K/UL (ref 3.5–11)

## 2020-02-14 PROCEDURE — 85027 COMPLETE CBC AUTOMATED: CPT

## 2020-02-14 PROCEDURE — 36415 COLL VENOUS BLD VENIPUNCTURE: CPT

## 2020-02-14 PROCEDURE — 80053 COMPREHEN METABOLIC PANEL: CPT

## 2020-02-20 ENCOUNTER — NURSE ONLY (OUTPATIENT)
Dept: FAMILY MEDICINE CLINIC | Age: 70
End: 2020-02-20
Payer: MEDICARE

## 2020-02-20 VITALS — DIASTOLIC BLOOD PRESSURE: 80 MMHG | SYSTOLIC BLOOD PRESSURE: 130 MMHG

## 2020-02-20 PROCEDURE — 99211 OFF/OP EST MAY X REQ PHY/QHP: CPT | Performed by: FAMILY MEDICINE

## 2020-02-20 NOTE — PROGRESS NOTES
Chief Complaint   Patient presents with    Hypertension     bp check         Patient is here for blood pressure recheck. 1. Essential hypertension        Well controlled BP   Continue current treatment.        BP Readings from Last 3 Encounters:   02/20/20 130/80   02/13/20 138/78   02/10/20 (!) 198/93       Pulse Readings from Last 3 Encounters:   02/13/20 71   02/10/20 82   11/26/18 68       Future Appointments   Date Time Provider Adin Billi   2/26/2020 11:30 AM ST DEXA RM [facility] Radiolog   4/13/2020 10:00 AM Patricia Kruse MD fp sc [address] [facility]

## 2020-02-25 ENCOUNTER — TELEPHONE (OUTPATIENT)
Dept: FAMILY MEDICINE CLINIC | Age: 70
End: 2020-02-25

## 2020-02-25 NOTE — TELEPHONE ENCOUNTER
[facility] called to make sure a result was received for the patients cologuard. If not please contact them back.  Order #[id number]

## 2020-02-26 ENCOUNTER — HOSPITAL ENCOUNTER (OUTPATIENT)
Dept: WOMENS IMAGING | Age: 70
Discharge: HOME | End: 2020-02-28
Payer: MEDICARE

## 2020-02-26 PROCEDURE — 77080 DXA BONE DENSITY AXIAL: CPT

## 2020-02-26 RX ORDER — RISEDRONATE SODIUM 150 MG/1
90 TABLET, FILM COATED ORAL
Qty: 12 TABLET | Refills: 2 | Status: SHIPPED | OUTPATIENT
Start: 2020-02-26 | End: 2020-10-06 | Stop reason: SDUPTHER

## 2020-03-02 ENCOUNTER — TELEPHONE (OUTPATIENT)
Dept: GASTROENTEROLOGY | Age: 70
End: 2020-03-02

## 2020-06-17 RX ORDER — LISINOPRIL 10 MG/1
TABLET ORAL
Qty: 60 TABLET | Refills: 0 | Status: SHIPPED | OUTPATIENT
Start: 2020-06-17 | End: 2020-07-06 | Stop reason: SDUPTHER

## 2020-06-17 NOTE — TELEPHONE ENCOUNTER
Please Approve or Refuse.   Send to Pharmacy per Pt's Request:      Next Visit Date:  7/6/2020   Last Visit Date: 2/13/2020    Hemoglobin A1C (%)   Date Value   12/19/2016 5.3             ( goal A1C is < 7)   BP Readings from Last 3 Encounters:   02/20/20 130/80   02/13/20 138/78   02/10/20 (!) 198/93          (goal 120/80)  BUN   Date Value Ref Range Status   02/14/2020 11 8 - 23 mg/dL Final     CREATININE   Date Value Ref Range Status   02/14/2020 0.61 0.50 - 0.90 mg/dL Final     Potassium   Date Value Ref Range Status   02/14/2020 4.2 3.7 - 5.3 mmol/L Final

## 2020-07-06 ENCOUNTER — OFFICE VISIT (OUTPATIENT)
Dept: FAMILY MEDICINE CLINIC | Age: 70
End: 2020-07-06
Payer: MEDICARE

## 2020-07-06 VITALS
BODY MASS INDEX: 26.31 KG/M2 | HEIGHT: 62 IN | SYSTOLIC BLOOD PRESSURE: 130 MMHG | WEIGHT: 143 LBS | TEMPERATURE: 98.2 F | HEART RATE: 66 BPM | DIASTOLIC BLOOD PRESSURE: 80 MMHG | OXYGEN SATURATION: 98 %

## 2020-07-06 PROBLEM — L29.9 SCALP ITCH: Status: RESOLVED | Noted: 2018-04-04 | Resolved: 2020-07-06

## 2020-07-06 PROBLEM — Z87.891 PERSONAL HISTORY OF TOBACCO USE: Status: ACTIVE | Noted: 2020-07-06

## 2020-07-06 PROBLEM — Z91.81 AT HIGH RISK FOR FALLS: Status: ACTIVE | Noted: 2020-07-06

## 2020-07-06 PROCEDURE — 99214 OFFICE O/P EST MOD 30 MIN: CPT | Performed by: FAMILY MEDICINE

## 2020-07-06 PROCEDURE — G0296 VISIT TO DETERM LDCT ELIG: HCPCS | Performed by: FAMILY MEDICINE

## 2020-07-06 RX ORDER — MIRABEGRON 50 MG/1
50 TABLET, FILM COATED, EXTENDED RELEASE ORAL DAILY
Qty: 90 TABLET | Refills: 3 | Status: SHIPPED | OUTPATIENT
Start: 2020-07-06 | End: 2021-07-29

## 2020-07-06 RX ORDER — LISINOPRIL 10 MG/1
TABLET ORAL
Qty: 60 TABLET | Refills: 3 | Status: SHIPPED | OUTPATIENT
Start: 2020-07-06 | End: 2021-05-05

## 2020-07-06 RX ORDER — VARENICLINE TARTRATE
KIT
Qty: 1 BOX | Refills: 2 | Status: SHIPPED | OUTPATIENT
Start: 2020-07-06 | End: 2020-08-04 | Stop reason: SDUPTHER

## 2020-07-06 RX ORDER — LORATADINE 10 MG/1
10 CAPSULE, LIQUID FILLED ORAL DAILY
Qty: 60 CAPSULE | Refills: 3 | Status: SHIPPED | OUTPATIENT
Start: 2020-07-06 | End: 2021-11-26 | Stop reason: SDUPTHER

## 2020-07-06 RX ORDER — ASPIRIN 81 MG/1
81 TABLET ORAL DAILY
Qty: 90 TABLET | Refills: 1 | Status: SHIPPED | OUTPATIENT
Start: 2020-07-06 | End: 2021-05-05

## 2020-07-06 ASSESSMENT — ENCOUNTER SYMPTOMS
RHINORRHEA: 0
BACK PAIN: 0
WHEEZING: 0
COUGH: 0
ABDOMINAL PAIN: 0
SHORTNESS OF BREATH: 0
DIARRHEA: 0
VOMITING: 0
NAUSEA: 0
CHEST TIGHTNESS: 0
COLOR CHANGE: 0
CONSTIPATION: 0
SINUS PRESSURE: 0
ABDOMINAL DISTENTION: 0
PHOTOPHOBIA: 0

## 2020-07-06 NOTE — PROGRESS NOTES
Visit Information    Have you changed or started any medications since your last visit including any over-the-counter medicines, vitamins, or herbal medicines? no   Are you having any side effects from any of your medications? -  no  Have you stopped taking any of your medications? Is so, why? -  no    Have you seen any other physician or provider since your last visit? No  Have you had any other diagnostic tests since your last visit? No  Have you been seen in the emergency room and/or had an admission to a hospital since we last saw you? No  Have you had your routine dental cleaning in the past 6 months? no    Have you activated your [employer] account? If not, what are your barriers?  Yes     Patient Care Team:  Anthony Conroy MD as PCP - General (Family Medicine)  Anthony Conroy MD as PCP - [facility]  Ida Parisi MD as Surgeon (Otolaryngology)    Medical History Review  Past Medical, Family, and Social History reviewed and does contribute to the patient presenting condition    Health Maintenance   Topic Date Due    Shingles Vaccine (1 of 2) 09/08/1998    Annual Wellness Visit (AWV)  06/19/2019    Breast cancer screen  04/13/2020    Potassium monitoring  02/14/2021    Creatinine monitoring  02/14/2021    Lipid screen  12/19/2021    Colon cancer screen fecal DNA test (Cologuard)  02/23/2023    DTaP/Tdap/Td vaccine (2 - Td) 04/04/2028    DEXA (modify frequency per FRAX score)  Completed    Pneumococcal 65+ years Vaccine  Completed    Hepatitis C screen  Completed    Hepatitis A vaccine  Aged Out    Hepatitis B vaccine  Aged Out    Hib vaccine  Aged Out    Meningococcal (ACWY) vaccine  Aged Out

## 2020-07-06 NOTE — PROGRESS NOTES
On the basis of positive falls risk screening, assessment and plan is as follows: in-office gait and balance testing performed using The Timed Up and Go Test was negative for increased falls risk- no further intervention is currently indicated, home safety tips provided, [facility] Exercise Intervention resources provided.

## 2020-07-06 NOTE — PROGRESS NOTES
Platelets 34/16/8879 202  150 - 450 k/uL Final    MPV 02/14/2020 8.6  6.0 - 12.0 fL Final    NRBC Automated 02/14/2020 NOT REPORTED  per 100 WBC Final    Glucose 02/14/2020 110* 70 - 99 mg/dL Final    BUN 02/14/2020 11  8 - 23 mg/dL Final    CREATININE 02/14/2020 0.61  0.50 - 0.90 mg/dL Final    Bun/Cre Ratio 02/14/2020 NOT REPORTED  9 - 20 Final    Calcium 02/14/2020 8.2* 8.6 - 10.4 mg/dL Final    Sodium 02/14/2020 137  135 - 144 mmol/L Final    Potassium 02/14/2020 4.2  3.7 - 5.3 mmol/L Final    Chloride 02/14/2020 104  98 - 107 mmol/L Final    CO2 02/14/2020 25  20 - 31 mmol/L Final    Anion Gap 02/14/2020 8* 9 - 17 mmol/L Final    Alkaline Phosphatase 02/14/2020 91  35 - 104 U/L Final    ALT 02/14/2020 24  5 - 33 U/L Final    AST 02/14/2020 23  <32 U/L Final    Total Bilirubin 02/14/2020 0.28* 0.3 - 1.2 mg/dL Final    Total Protein 02/14/2020 6.5  6.4 - 8.3 g/dL Final    Alb 02/14/2020 3.9  3.5 - 5.2 g/dL Final    Albumin/Globulin Ratio 02/14/2020 NOT REPORTED  1.0 - 2.5 Final    GFR Non- 02/14/2020 >60  >60 mL/min Final    GFR  02/14/2020 >60  >60 mL/min Final    GFR Comment 02/14/2020        Final    Comment: Average GFR for 79or more years old:   76 mL/min/1.73sq m  Chronic Kidney Disease:   <60 mL/min/1.73sq m  Kidney failure:   <15 mL/min/1.73sq m              eGFR calculated using average adult body mass.  Additional eGFR calculator available at:        [email or web address]            GFR Staging 02/14/2020 NOT REPORTED   Final         Most recent labs reviewed:     Lab Results   Component Value Date    WBC 5.4 02/14/2020    HGB 13.5 02/14/2020    HCT 39.5 02/14/2020    MCV 94.6 02/14/2020     02/14/2020       @BRIEFLAB(NA,K,CL,CO2,BUN,CREATININE,GLUCOSE,CALCIUM)@     Lab Results   Component Value Date    ALT 24 02/14/2020    AST 23 02/14/2020    ALKPHOS 91 02/14/2020    BILITOT 0.28 (L) 02/14/2020       No results children: Not on file    Years of education: Not on file    Highest education level: Not on file   Occupational History    Not on file   Social Needs    Financial resource strain: Not on file    Food insecurity     Worry: Not on file     Inability: Not on file    Transportation needs     Medical: Not on file     Non-medical: Not on file   Tobacco Use    Smoking status: Current Every Day Smoker     Packs/day: 1.00     Years: 35.00     Pack years: 35.00     Types: Cigarettes    Smokeless tobacco: Never Used   Substance and Sexual Activity    Alcohol use: No    Drug use: No    Sexual activity: Not on file   Lifestyle    Physical activity     Days per week: Not on file     Minutes per session: Not on file    Stress: Not on file   Relationships    Social connections     Talks on phone: Not on file     Gets together: Not on file     Attends Jew service: Not on file     Active member of club or organization: Not on file     Attends meetings of clubs or organizations: Not on file     Relationship status: Not on file    Intimate partner violence     Fear of current or ex partner: Not on file     Emotionally abused: Not on file     Physically abused: Not on file     Forced sexual activity: Not on file   Other Topics Concern    Not on file   Social History Narrative    Not on file     Ready to quit: Not Answered  Counseling given: Yes        Family History   Problem Relation Age of Onset    Heart Attack Maternal Grandmother     Diabetes Father     Stroke Father              -rest of complaints with corresponding details per ROS    The patient's past medical, surgical, social, and family history as well as her current medications and allergies were reviewed as documented intoday's encounter. Review of Systems   Constitutional: Negative for activity change, appetite change, diaphoresis, fatigue and unexpected weight change. HENT: Positive for congestion.  Negative for ear pain, nosebleeds, postnasal drip, rhinorrhea and sinus pressure. Eyes: Negative for photophobia and visual disturbance. Respiratory: Negative for cough, chest tightness, shortness of breath and wheezing. Cardiovascular: Negative for chest pain, palpitations and leg swelling. Gastrointestinal: Negative for abdominal distention, abdominal pain, constipation, diarrhea, nausea and vomiting. Endocrine: Negative for polyphagia and polyuria. Genitourinary: Negative for difficulty urinating, flank pain, frequency, pelvic pain, urgency and vaginal pain. Musculoskeletal: Positive for arthralgias. Negative for back pain, gait problem, joint swelling, neck pain and neck stiffness. Skin: Negative for color change and rash. Neurological: Negative for dizziness, tremors, seizures, speech difficulty, weakness, light-headedness, numbness and headaches. Psychiatric/Behavioral: Negative for agitation, behavioral problems, decreased concentration, dysphoric mood, sleep disturbance and suicidal ideas. The patient is not nervous/anxious and is not hyperactive. Physical Exam    PHYSICAL EXAM:   VITALS:   Vitals:    07/06/20 1046   BP: 130/80   Pulse: 66   Temp: 98.2 °F (36.8 °C)   SpO2: 98%     GENERAL:  Patient is a well-developed, well-nourished female  in no acute distress, alert and oriented x3, appropriate and pleasant conversation. HEAD: Normocephalic, atraumatic. EYES: Pupils equal, round and reactive to light and accommodation, extraocular   movements intact. ENT: Moist mucous membranes. No erythema is noted. NECK: Supple. No masses. No lymphadenopathy. CARDIOVASCULAR: Regular rate and rhythm. PULMONARY: Occasional wheezes on auscultation bilaterally. ABDOMEN: Soft, nontender, nondistended. Positive bowel sounds. MUSCULOSKELETAL: Strength 5/5 bilaterally in all extremities. No tenderness to   palpation of the ribs, long bones, or spine. NEUROLOGIC: Cranial nerves II through XII grossly intact.  No focal deficits are noted. ASSESSMENT AND PLAN      1. Essential hypertension  Controlled continue same medications  - lisinopril (PRINIVIL;ZESTRIL) 10 MG tablet; TAKE 1 TABLET BY MOUTH ONE TIME A DAY  Dispense: 60 tablet; Refill: 3  - aspirin EC 81 MG EC tablet; Take 1 tablet by mouth daily  Dispense: 90 tablet; Refill: 1    2. Age-related osteoporosis without current pathological fracture  Continue bisphosphonates and vitamin D.    3. Stress incontinence of urine  Continue current medication  - MYRBETRIQ 50 MG TB24; Take 50 mg by mouth daily  Dispense: 90 tablet; Refill: 3    4. At high risk for falls  Patient refused physical therapy was accidental fall    5. Chronic ethmoidal sinusitis  Stable on current treatment  - loratadine (CLARITIN) 10 MG capsule; Take 1 capsule by mouth daily  Dispense: 60 capsule; Refill: 3    6. Personal history of tobacco use  Patient is willing to quit smoking, started on Chantix, CT lung screening ordered  - OK VISIT TO DISCUSS LUNG CA SCREEN W LDCT  - CT Lung Screen (Annual); Future  - varenicline (CHANTIX STARTING MONTH ERMA) 0.5 MG X 11 & 1 MG X 42 tablet; 0.5 mg po daily x 3 days, 0.5 mg BID x 4 days, then 1 mg BID thereafter . Call for refill  Dispense: 1 box; Refill: 2    Positive fit test  Referral reprinted and patient is willing to schedule colonoscopy. 7. Encounter for screening for malignant neoplasm of breast    - BLAYNE DIGITAL SCREEN W CAD BILATERAL; Future    8. Need for vaccination    - zoster recombinant adjuvanted vaccine [facility]) 50 MCG/0.5ML SUSR injection; Inject 0.5 mLs into the muscle See Admin Instructions 1 dose now and repeat in 2-6 months  Dispense: 0.5 mL; Refill: 0    9. Screening cholesterol level    - Lipid Panel;  Future      Orders Placed This Encounter   Procedures    BLAYNE DIGITAL SCREEN W CAD BILATERAL     Standing Status:   Future     Standing Expiration Date:   9/6/2021     Order Specific Question:   Reason for exam:     Answer:   Screening for self-tracking handout given in paper form or via [employer]? Yes    Requested Prescriptions     Signed Prescriptions Disp Refills    zoster recombinant adjuvanted vaccine (SHINGRIX) 50 MCG/0.5ML SUSR injection 0.5 mL 0     Sig: Inject 0.5 mLs into the muscle See Admin Instructions 1 dose now and repeat in 2-6 months    loratadine (CLARITIN) 10 MG capsule 60 capsule 3     Sig: Take 1 capsule by mouth daily    MYRBETRIQ 50 MG TB24 90 tablet 3     Sig: Take 50 mg by mouth daily    lisinopril (PRINIVIL;ZESTRIL) 10 MG tablet 60 tablet 3     Sig: TAKE 1 TABLET BY MOUTH ONE TIME A DAY    aspirin EC 81 MG EC tablet 90 tablet 1     Sig: Take 1 tablet by mouth daily    varenicline (CHANTIX STARTING MONTH ) 0.5 MG X 11 & 1 MG X 42 tablet 1 box 2     Si.5 mg po daily x 3 days, 0.5 mg BID x 4 days, then 1 mg BID thereafter . Call for refill       All patient questions answered. Patient voiced understanding. Quality Measures    Body mass index is 26.16 kg/m². Elevated. Weight control planned discussed Healthy diet and regular exercise. BP: 130/80. Blood pressure is normal. Treatment plan consists of No treatment change needed. Fall Risk 2020   2 or more falls in past year? yes   Fall with injury in past year? yes     The patient has a history of falls. I did , complete a risk assessment for falls.  A plan of care for falls in-office gait and balance testing performed using The Timed Up and Go Test was negative for increased falls risk- no further intervention is currently indicated, No Treatment plan indicated    Lab Results   Component Value Date    LDLCHOLESTEROL 79 2016    (goal LDL reduction with dx if diabetes is 50% LDL reduction)    PHQ Scores 2020   PHQ2 Score 1 0   PHQ9 Score 1 0     Interpretation of Total Score Depression Severity: 1-4 = Minimal depression, 5-9 = Mild depression, 10-14 = Moderate depression, 15-19 = Moderately severe depression, 20-27 = Severe depression      The patient'spast medical, surgical, social, and family history as well as her   current medications and allergies were reviewed as documented in today's encounter. Medications, labs, diagnostic studies, consultations andfollow-up as documented in this encounter. Return in about 3 months (around 10/6/2020) for CT results. Patient wasseen with total face to face time of 40 minutes. More than 50% of this visit was counseling and education. Future Appointments   Date Time Provider Adin Gonzalez   10/6/2020 11:00 AM Og Pressley MD [email or web address]     This note was completed by using the assistance of a speech-recognition program. However, inadvertent computerized transcription errors may be present. Althoughevery effort was made to ensure accuracy, no guarantees can be provided that every mistake has been identified and corrected by editing. Electronically signed by Og Pressley MD on 7/6/2020  11:21 AM            Low Dose CT (LDCT) Lung Screening criteria met   Age 50-69   Pack year smoking >30   Still smoking or less than 15 year since quit   No sign or symptoms of lung cancer   > 11 months since last LDCT     Risks and benefits of lung cancer screening with LDCT scans discussed:    Significance of positive screen - False-positive LDCT results often occur. 95% of all positive results do not lead to a diagnosis of cancer. Usually further imaging can resolve most false-positive results; however, some patients may require invasive procedures. Over diagnosis risk - 10% to 12% of screen-detected lung cancer cases are over diagnosed--that is, the cancer would not have been detected in the patient's lifetime without the screening.     Need for follow up screens annually to continue lung cancer screening effectiveness     Risks associated with radiation from annual LDCT- Radiation exposure is about the same as for a mammogram, which is about 1/3 of the annual background radiation exposure from everyday life. Starting screening at age 54 is not likely to increase cancer risk from radiation exposure. Patients with comorbidities resulting in life expectancy of < 10 years, or that would preclude treatment of an abnormality identified on CT, should not be screened due to lack of benefit.     To obtain maximal benefit from this screening, smoking cessation and long-term abstinence from smoking is critical

## 2020-08-04 RX ORDER — VARENICLINE TARTRATE
KIT
Qty: 1 BOX | Refills: 2 | Status: SHIPPED | OUTPATIENT
Start: 2020-08-04 | End: 2021-02-25

## 2020-08-04 RX ORDER — VARENICLINE TARTRATE 1 MG/1
1 TABLET, FILM COATED ORAL 2 TIMES DAILY
Qty: 60 TABLET | Refills: 3 | Status: SHIPPED | OUTPATIENT
Start: 2020-08-04 | End: 2021-02-25

## 2020-08-17 ENCOUNTER — TELEPHONE (OUTPATIENT)
Dept: ONCOLOGY | Age: 70
End: 2020-08-17

## 2020-08-17 NOTE — LETTER
8/17/2020        [address]    Dear Kirstie Salter Cantu: Your healthcare provider has ordered a low dose CT scan of the chest for lung cancer screening. You will find enclosed, information about CT lung screening. Please review the statement of understanding, you will be asked to sign a copy of this at the time of your CT scan    If you have not already been contacted to make the appointment for your scan, please call our scheduling department at [phone]    Keep in mind that CT lung screening does not take the place of smoking cessation. If you are a current smoker, you will find enclosed smoking cessation resources. Please do not hesitate to contact me if you have any questions or concerns.     [facility] [place],      [id number] [place] [address] Lung Screening Program  [phone]

## 2020-08-20 ENCOUNTER — HOSPITAL ENCOUNTER (OUTPATIENT)
Dept: CT IMAGING | Age: 70
Discharge: HOME | End: 2020-08-22
Payer: MEDICARE

## 2020-08-20 PROCEDURE — G0297 LDCT FOR LUNG CA SCREEN: HCPCS

## 2020-09-18 ENCOUNTER — HOSPITAL ENCOUNTER (OUTPATIENT)
Dept: WOMENS IMAGING | Age: 70
Discharge: HOME | End: 2020-09-20
Payer: MEDICARE

## 2020-09-18 PROCEDURE — 77063 BREAST TOMOSYNTHESIS BI: CPT

## 2020-10-06 ENCOUNTER — OFFICE VISIT (OUTPATIENT)
Dept: FAMILY MEDICINE CLINIC | Age: 70
End: 2020-10-06
Payer: MEDICARE

## 2020-10-06 VITALS
TEMPERATURE: 97.5 F | RESPIRATION RATE: 12 BRPM | HEART RATE: 67 BPM | HEIGHT: 62 IN | WEIGHT: 147.8 LBS | SYSTOLIC BLOOD PRESSURE: 135 MMHG | BODY MASS INDEX: 27.2 KG/M2 | DIASTOLIC BLOOD PRESSURE: 69 MMHG | OXYGEN SATURATION: 98 %

## 2020-10-06 PROBLEM — R19.5 POSITIVE COLORECTAL CANCER SCREENING USING COLOGUARD TEST: Status: ACTIVE | Noted: 2020-10-06

## 2020-10-06 PROCEDURE — 99214 OFFICE O/P EST MOD 30 MIN: CPT | Performed by: FAMILY MEDICINE

## 2020-10-06 RX ORDER — RISEDRONATE SODIUM 150 MG/1
90 TABLET, FILM COATED ORAL
Qty: 12 TABLET | Refills: 3 | Status: SHIPPED | OUTPATIENT
Start: 2020-10-06 | End: 2020-10-08

## 2020-10-06 ASSESSMENT — PATIENT HEALTH QUESTIONNAIRE - PHQ9
2. FEELING DOWN, DEPRESSED OR HOPELESS: 0
SUM OF ALL RESPONSES TO PHQ QUESTIONS 1-9: 0
SUM OF ALL RESPONSES TO PHQ9 QUESTIONS 1 & 2: 0
1. LITTLE INTEREST OR PLEASURE IN DOING THINGS: 0
SUM OF ALL RESPONSES TO PHQ QUESTIONS 1-9: 0

## 2020-10-06 ASSESSMENT — ENCOUNTER SYMPTOMS
CONSTIPATION: 0
BACK PAIN: 0
VOMITING: 0
SHORTNESS OF BREATH: 0
WHEEZING: 0
SINUS PRESSURE: 0
RECTAL PAIN: 0
SINUS PAIN: 0
NAUSEA: 0
RHINORRHEA: 0
ABDOMINAL PAIN: 0
COLOR CHANGE: 1
COUGH: 0
ABDOMINAL DISTENTION: 0
BLOOD IN STOOL: 0

## 2020-10-06 NOTE — PROGRESS NOTES
Chief Complaint   Patient presents with    Hypertension    Results     CT         Juan M Cohen  here today for follow up on chronic medical problems, go over labs and/or diagnostic studies, and medication refills. Hypertension and Results (CT)      HPI: Patient is here for follow-up for hypertension, blood test results. Hypertension controlled denies any chest pain shortness of breath. Patient is cut down on smoking 2 cigarettes/day patient finished Chantix 2 packets. Stress incontinence reports symptoms are controlled. Patient has positive Cologuard, was referred to GI patient reports she missed an appointment and has reschedule. Patient also complains of rash which is chronic on and off is getting on her lower extremities, reports it does not go away. Patient wants to see dermatologist.    Patient has CT lung screening done which was normal          /69   Pulse 67   Temp 97.5 °F (36.4 °C) (Temporal)   Resp 12   Ht 5' 2\" (1.575 m)   Wt 147 lb 12.8 oz (67 kg)   SpO2 98%   BMI 27.03 kg/m²    Body mass index is 27.03 kg/m². Wt Readings from Last 3 Encounters:   10/06/20 147 lb 12.8 oz (67 kg)   07/06/20 143 lb (64.9 kg)   02/13/20 147 lb (66.7 kg)        []Negative depression screening. PHQ Scores 10/6/2020 2/13/2020 4/4/2018   PHQ2 Score 0 1 0   PHQ9 Score 0 1 0      []1-4 = Minimal depression   []5-9 = Milddepression   []10-14 = Moderate depression   []15-19 = Moderately severe depression   []20-27 = Severe depression    Discussed testing with the patient and all questions fully answered.     Hospital Outpatient Visit on 02/14/2020   Component Date Value Ref Range Status    WBC 02/14/2020 5.4  3.5 - 11.0 k/uL Final    RBC 02/14/2020 4.18  4.0 - 5.2 m/uL Final    Hemoglobin 02/14/2020 13.5  12.0 - 16.0 g/dL Final    Hematocrit 02/14/2020 39.5  36 - 46 % Final    MCV 02/14/2020 94.6  80 - 100 fL Final    MCH 02/14/2020 32.4  26 - 34 pg Final    MCHC 02/14/2020 34.2  31 - 37 g/dL Final    RDW 02/14/2020 12.9  11.5 - 14.9 % Final    Platelets 32/06/6374 202  150 - 450 k/uL Final    MPV 02/14/2020 8.6  6.0 - 12.0 fL Final    NRBC Automated 02/14/2020 NOT REPORTED  per 100 WBC Final    Glucose 02/14/2020 110* 70 - 99 mg/dL Final    BUN 02/14/2020 11  8 - 23 mg/dL Final    CREATININE 02/14/2020 0.61  0.50 - 0.90 mg/dL Final    Bun/Cre Ratio 02/14/2020 NOT REPORTED  9 - 20 Final    Calcium 02/14/2020 8.2* 8.6 - 10.4 mg/dL Final    Sodium 02/14/2020 137  135 - 144 mmol/L Final    Potassium 02/14/2020 4.2  3.7 - 5.3 mmol/L Final    Chloride 02/14/2020 104  98 - 107 mmol/L Final    CO2 02/14/2020 25  20 - 31 mmol/L Final    Anion Gap 02/14/2020 8* 9 - 17 mmol/L Final    Alkaline Phosphatase 02/14/2020 91  35 - 104 U/L Final    ALT 02/14/2020 24  5 - 33 U/L Final    AST 02/14/2020 23  <32 U/L Final    Total Bilirubin 02/14/2020 0.28* 0.3 - 1.2 mg/dL Final    Total Protein 02/14/2020 6.5  6.4 - 8.3 g/dL Final    Alb 02/14/2020 3.9  3.5 - 5.2 g/dL Final    Albumin/Globulin Ratio 02/14/2020 NOT REPORTED  1.0 - 2.5 Final    GFR Non- 02/14/2020 >60  >60 mL/min Final    GFR  02/14/2020 >60  >60 mL/min Final    GFR Comment 02/14/2020        Final    Comment: Average GFR for 79or more years old:   76 mL/min/1.73sq m  Chronic Kidney Disease:   <60 mL/min/1.73sq m  Kidney failure:   <15 mL/min/1.73sq m              eGFR calculated using average adult body mass.  Additional eGFR calculator available at:        thereNow.br            GFR Staging 02/14/2020 NOT REPORTED   Final         Most recent labs reviewed:     Lab Results   Component Value Date    WBC 5.4 02/14/2020    HGB 13.5 02/14/2020    HCT 39.5 02/14/2020    MCV 94.6 02/14/2020     02/14/2020       @BRIEFLAB(NA,K,CL,CO2,BUN,CREATININE,GLUCOSE,CALCIUM)@     Lab Results   Component Value Date    ALT 24 02/14/2020    AST 23 02/14/2020    ALKPHOS 91 02/14/2020    BILITOT 0.28 (L) 02/14/2020       No results found for: TSHFT4, TSH    Lab Results   Component Value Date    CHOL 154 12/19/2016     Lab Results   Component Value Date    TRIG 143 12/19/2016     Lab Results   Component Value Date    HDL 46 12/19/2016     Lab Results   Component Value Date    LDLCHOLESTEROL 79 12/19/2016     Lab Results   Component Value Date    VLDL NOT REPORTED 12/19/2016     Lab Results   Component Value Date    CHOLHDLRATIO 3.3 12/19/2016       Lab Results   Component Value Date    LABA1C 5.3 12/19/2016       No results found for: GTJPTIAO43    No results found for: FOLATE    No results found for: IRON, TIBC, FERRITIN    No results found for: VITD25          Current Outpatient Medications   Medication Sig Dispense Refill    Risedronate Sodium 150 MG TABS Take 90 mg by mouth every 30 days 12 tablet 3    varenicline (CHANTIX STARTING MONTH ERMA) 0.5 MG X 11 & 1 MG X 42 tablet 0.5 mg po daily x 3 days, 0.5 mg BID x 4 days, then 1 mg BID thereafter . Call for refill 1 box 2    varenicline (CHANTIX CONTINUING MONTH ERMA) 1 MG tablet Take 1 tablet by mouth 2 times daily 60 tablet 3    zoster recombinant adjuvanted vaccine (SHINGRIX) 50 MCG/0.5ML SUSR injection Inject 0.5 mLs into the muscle See Admin Instructions 1 dose now and repeat in 2-6 months 0.5 mL 0    loratadine (CLARITIN) 10 MG capsule Take 1 capsule by mouth daily 60 capsule 3    MYRBETRIQ 50 MG TB24 Take 50 mg by mouth daily 90 tablet 3    lisinopril (PRINIVIL;ZESTRIL) 10 MG tablet TAKE 1 TABLET BY MOUTH ONE TIME A DAY 60 tablet 3    aspirin EC 81 MG EC tablet Take 1 tablet by mouth daily 90 tablet 1    ibuprofen (ADVIL;MOTRIN) 800 MG tablet Take 1 tablet by mouth every 6 hours as needed for Pain 20 tablet 0    sodium chloride (OCEAN) 0.65 % nasal spray 1 spray by Nasal route as needed for Congestion 1 Bottle 3     No current facility-administered medications for this visit.               Social History     Socioeconomic History    Marital status:      Spouse name: Not on file    Number of children: Not on file    Years of education: Not on file    Highest education level: Not on file   Occupational History    Not on file   Social Needs    Financial resource strain: Not on file    Food insecurity     Worry: Not on file     Inability: Not on file    Transportation needs     Medical: Not on file     Non-medical: Not on file   Tobacco Use    Smoking status: Current Every Day Smoker     Packs/day: 1.00     Years: 35.00     Pack years: 35.00     Types: Cigarettes    Smokeless tobacco: Never Used   Substance and Sexual Activity    Alcohol use: No    Drug use: No    Sexual activity: Not on file   Lifestyle    Physical activity     Days per week: Not on file     Minutes per session: Not on file    Stress: Not on file   Relationships    Social connections     Talks on phone: Not on file     Gets together: Not on file     Attends Jainism service: Not on file     Active member of club or organization: Not on file     Attends meetings of clubs or organizations: Not on file     Relationship status: Not on file    Intimate partner violence     Fear of current or ex partner: Not on file     Emotionally abused: Not on file     Physically abused: Not on file     Forced sexual activity: Not on file   Other Topics Concern    Not on file   Social History Narrative    Not on file     Ready to quit: Yes  Counseling given: Yes        Family History   Problem Relation Age of Onset    Heart Attack Maternal Grandmother     Diabetes Father     Stroke Father              -rest of complaints with corresponding details per ROS    The patient's past medical, surgical, social, and family history as well as her current medications and allergies were reviewed as documented intoday's encounter. Review of Systems   Constitutional: Negative for activity change, appetite change, diaphoresis, fatigue and unexpected weight change. HENT: Negative for congestion, ear pain, hearing loss, rhinorrhea, sinus pressure, sinus pain and tinnitus. Eyes: Negative for visual disturbance. Respiratory: Negative for cough, shortness of breath and wheezing. Cardiovascular: Negative for chest pain, palpitations and leg swelling. Gastrointestinal: Negative for abdominal distention, abdominal pain, blood in stool, constipation, nausea, rectal pain and vomiting. Endocrine: Negative for polyuria. Genitourinary: Negative for difficulty urinating, flank pain, hematuria, urgency and vaginal pain. Musculoskeletal: Negative for arthralgias, back pain, gait problem, neck pain and neck stiffness. Skin: Positive for color change and rash. Neurological: Negative for dizziness, speech difficulty, weakness, light-headedness and numbness. Psychiatric/Behavioral: Negative for agitation, decreased concentration, dysphoric mood, sleep disturbance and suicidal ideas. The patient is not nervous/anxious. Physical Exam  Skin:     Coloration: Skin is not pale. Findings: Lesion and rash present. Rash is nodular. PHYSICAL EXAM:   VITALS:   Vitals:    10/06/20 1113   BP: 135/69   Pulse: 67   Resp: 12   Temp: 97.5 °F (36.4 °C)   SpO2: 98%     GENERAL:  Patient is a well-developed, well-nourished female  in no acute distress, alert and oriented x3, appropriate and pleasant conversation. HEAD: Normocephalic, atraumatic. EYES: Pupils equal, round and reactive to light and accommodation, extraocular   movements intact. ENT: Moist mucous membranes. No erythema is noted. NECK: Supple. No masses. No lymphadenopathy. CARDIOVASCULAR: Regular rate and rhythm. PULMONARY: Lungs are clear to auscultation bilaterally. ABDOMEN: Soft, nontender, nondistended. Positive bowel sounds. MUSCULOSKELETAL: Strength 5/5 bilaterally in all extremities. No tenderness to   palpation of the ribs, long bones, or spine.    NEUROLOGIC: Cranial nerves II through XII grossly intact. No focal deficits are noted. ASSESSMENT AND PLAN      1. Essential hypertension  Controlled continue same medications    2. Age-related osteoporosis without current pathological fracture  Stable on current treatment continue vitamin D  - Risedronate Sodium 150 MG TABS; Take 90 mg by mouth every 30 days  Dispense: 12 tablet; Refill: 3    3. Positive colorectal cancer screening using Cologuard test  Referral reprinted and encouraged patient to schedule appointment    4. Stress incontinence of urine  Continue same medications  5. Rash and nonspecific skin eruption  Referral placed  - Demar Rivera MD, Dermatology, [place]    6. Personal history of tobacco use  Continue to work on smoking CT lung screening normal      Orders Placed This Encounter   Procedures   Demar Rivera MD, Dermatology, [place]     Referral Priority:   Routine     Referral Type:   Eval and Treat     Referral Reason:   Specialty Services Required     Referred to Provider:   Pio Collado MD     Requested Specialty:   Dermatology     Number of Visits Requested:   1         Medications Discontinued During This Encounter   Medication Reason    Risedronate Sodium 150 MG TABS REORDER       Rajeev Kauffman received counseling on the following healthy behaviors: nutrition, exercise, medication adherence and tobacco cessation  Reviewed prior labs and health maintenance  Continue current medications, diet and exercise. Discussed use, benefit, and side effects of prescribed medications. Barriers to medication compliance addressed. Patient given educational materials - see patient instructions  Was a self-tracking handout given in paper form or via [employer]? Yes    Requested Prescriptions     Signed Prescriptions Disp Refills    Risedronate Sodium 150 MG TABS 12 tablet 3     Sig: Take 90 mg by mouth every 30 days       All patient questions answered. Patient voiced understanding.      Quality Measures    Body mass index is 27.03 kg/m². Elevated. Weight control planned discussed daily exercise regimen and Healthy diet and regular exercise. BP: 135/69. Blood pressure is normal. Treatment plan consists of No treatment change needed. Fall Risk 10/6/2020 2/13/2020   2 or more falls in past year? no yes   Fall with injury in past year? no yes     The patient does not have a history of falls. I did , complete a risk assessment for falls. A plan of care for falls No Treatment plan indicated    Lab Results   Component Value Date    LDLCHOLESTEROL 79 12/19/2016    (goal LDL reduction with dx if diabetes is 50% LDL reduction)    PHQ Scores 10/6/2020 2/13/2020 4/4/2018   PHQ2 Score 0 1 0   PHQ9 Score 0 1 0     Interpretation of Total Score Depression Severity: 1-4 = Minimal depression, 5-9 = Mild depression, 10-14 = Moderate depression, 15-19 = Moderately severe depression, 20-27 = Severe depression      The patient'spast medical, surgical, social, and family history as well as her   current medications and allergies were reviewed as documented in today's encounter. Medications, labs, diagnostic studies, consultations andfollow-up as documented in this encounter. No follow-ups on file. Patient wasseen with total face to face time of 25 minutes. More than 50% of this visit was counseling and education. Future Appointments   Date Time Provider Adin Gonzalez   1/11/2021 10:15 AM Guerita Agee MD [facility]     This note was completed by using the assistance of a speech-recognition program. However, inadvertent computerized transcription errors may be present. Althoughevery effort was made to ensure accuracy, no guarantees can be provided that every mistake has been identified and corrected by editing.   Electronically signed by Guerita Agee MD on 10/6/2020  1:04 PM

## 2020-10-06 NOTE — PROGRESS NOTES
Visit Information    Have you changed or started any medications since your last visit including any over-the-counter medicines, vitamins, or herbal medicines? no   Are you having any side effects from any of your medications? -  no  Have you stopped taking any of your medications? Is so, why? -  no    Have you seen any other physician or provider since your last visit? No  Have you had any other diagnostic tests since your last visit? Yes - Records Obtained  Have you been seen in the emergency room and/or had an admission to a hospital since we last saw you? No  Have you had your routine dental cleaning in the past 6 months? yes -     Have you activated your [employer] account? If not, what are your barriers?  Yes     Patient Care Team:  Licha Horvath MD as PCP - General (Family Medicine)  Licha Horvath MD as PCP - [facility]  Nando Pacheco MD as Surgeon (Otolaryngology)  Shavonne Choi RN as Imaging Navigator    Medical History Review  Past Medical, Family, and Social History reviewed and does contribute to the patient presenting condition    Health Maintenance   Topic Date Due    Shingles Vaccine (1 of 2) 09/08/1998    Annual Wellness Visit (AWV)  06/19/2019    Potassium monitoring  02/14/2021    Creatinine monitoring  02/14/2021    Low dose CT lung screening  08/20/2021    Lipid screen  12/19/2021    Breast cancer screen  09/18/2022    Colon cancer screen fecal DNA test (Cologuard)  02/23/2023    DTaP/Tdap/Td vaccine (2 - Td) 04/04/2028    DEXA (modify frequency per FRAX score)  Completed    Pneumococcal 65+ years Vaccine  Completed    Hepatitis C screen  Completed    Hepatitis A vaccine  Aged Out    Hepatitis B vaccine  Aged Out    Hib vaccine  Aged Out    Meningococcal (ACWY) vaccine  Aged Out

## 2020-10-08 ENCOUNTER — TELEPHONE (OUTPATIENT)
Dept: FAMILY MEDICINE CLINIC | Age: 70
End: 2020-10-08

## 2020-10-08 RX ORDER — RISEDRONATE SODIUM 150 MG/1
150 TABLET, FILM COATED ORAL
Qty: 12 TABLET | Refills: 3 | Status: SHIPPED | OUTPATIENT
Start: 2020-10-08 | End: 2021-10-29

## 2020-10-08 NOTE — TELEPHONE ENCOUNTER
Pharmacist called to verify direction on Risedronate Sodium 150 MG   Sig: Take 90 mg by mouth every 30 days      Please Advice

## 2020-11-17 ENCOUNTER — TELEPHONE (OUTPATIENT)
Dept: GASTROENTEROLOGY | Age: 70
End: 2020-11-17

## 2020-11-17 RX ORDER — POLYETHYLENE GLYCOL 3350 17 G/17G
POWDER, FOR SOLUTION ORAL
Qty: 238 G | Refills: 0 | Status: SHIPPED | OUTPATIENT
Start: 2020-11-17 | End: 2021-06-18

## 2020-11-19 ENCOUNTER — HOSPITAL ENCOUNTER (OUTPATIENT)
Dept: PREADMISSION TESTING | Age: 70
Discharge: HOME | End: 2020-11-23
Payer: MEDICARE

## 2020-11-19 PROCEDURE — U0003 INFECTIOUS AGENT DETECTION BY NUCLEIC ACID (DNA OR RNA); SEVERE ACUTE RESPIRATORY SYNDROME CORONAVIRUS 2 (SARS-COV-2) (CORONAVIRUS DISEASE [COVID-19]), AMPLIFIED PROBE TECHNIQUE, MAKING USE OF HIGH THROUGHPUT TECHNOLOGIES AS DESCRIBED BY CMS-2020-01-R: HCPCS

## 2020-11-20 ENCOUNTER — TELEPHONE (OUTPATIENT)
Dept: GASTROENTEROLOGY | Age: 70
End: 2020-11-20

## 2020-11-20 ENCOUNTER — ANESTHESIA EVENT (OUTPATIENT)
Dept: ENDOSCOPY | Age: 70
End: 2020-11-20
Payer: MEDICARE

## 2020-11-20 LAB
SARS-COV-2, RAPID: NORMAL
SARS-COV-2: NORMAL
SARS-COV-2: NOT DETECTED
SOURCE: NORMAL

## 2020-11-20 NOTE — TELEPHONE ENCOUNTER
Writer left voicemail message to notify patient that procedure scheduled on Monday 11/23/20 has been cancelled, requested call back at her earliest convenience to discuss rescheduling this procedure.

## 2020-11-20 NOTE — TELEPHONE ENCOUNTER
Mary Andrews returned the call to R/S her procedure from Monday 11/23/20 pt can only come on Mondays and does not want to go to [facility]. At this time all of Dr Pantera Braga Mondays are at [facility] for the rest of this year, pt inquires if she can be scheduled with a different provider at [facility]. New appt  Tomorrow 11/21/20 at [facility] with Dr Rachel Nelson arrival time 8:45 am.  covid test results in chart and are negative. [facility] notified.

## 2020-11-21 ENCOUNTER — ANESTHESIA (OUTPATIENT)
Dept: ENDOSCOPY | Age: 70
End: 2020-11-21
Payer: MEDICARE

## 2020-11-21 ENCOUNTER — HOSPITAL ENCOUNTER (OUTPATIENT)
Age: 70
Setting detail: OUTPATIENT SURGERY
Discharge: HOME | End: 2020-11-21
Attending: INTERNAL MEDICINE | Admitting: INTERNAL MEDICINE
Payer: MEDICARE

## 2020-11-21 VITALS
DIASTOLIC BLOOD PRESSURE: 50 MMHG | SYSTOLIC BLOOD PRESSURE: 125 MMHG | HEIGHT: 62 IN | WEIGHT: 145 LBS | HEART RATE: 54 BPM | BODY MASS INDEX: 26.68 KG/M2 | OXYGEN SATURATION: 97 % | TEMPERATURE: 97.4 F | RESPIRATION RATE: 13 BRPM

## 2020-11-21 VITALS — OXYGEN SATURATION: 99 % | DIASTOLIC BLOOD PRESSURE: 60 MMHG | SYSTOLIC BLOOD PRESSURE: 128 MMHG

## 2020-11-21 PROCEDURE — 3609010600 HC COLONOSCOPY POLYPECTOMY SNARE/COLD BIOPSY: Performed by: INTERNAL MEDICINE

## 2020-11-21 PROCEDURE — 45385 COLONOSCOPY W/LESION REMOVAL: CPT | Performed by: INTERNAL MEDICINE

## 2020-11-21 PROCEDURE — 2709999900 HC NON-CHARGEABLE SUPPLY: Performed by: INTERNAL MEDICINE

## 2020-11-21 PROCEDURE — 7100000001 HC PACU RECOVERY - ADDTL 15 MIN: Performed by: INTERNAL MEDICINE

## 2020-11-21 PROCEDURE — 3700000001 HC ADD 15 MINUTES (ANESTHESIA): Performed by: INTERNAL MEDICINE

## 2020-11-21 PROCEDURE — 3700000000 HC ANESTHESIA ATTENDED CARE: Performed by: INTERNAL MEDICINE

## 2020-11-21 PROCEDURE — 88305 TISSUE EXAM BY PATHOLOGIST: CPT

## 2020-11-21 PROCEDURE — 2500000003 HC RX 250 WO HCPCS

## 2020-11-21 PROCEDURE — 2580000003 HC RX 258: Performed by: ANESTHESIOLOGY

## 2020-11-21 PROCEDURE — 6360000002 HC RX W HCPCS

## 2020-11-21 PROCEDURE — 7100000000 HC PACU RECOVERY - FIRST 15 MIN: Performed by: INTERNAL MEDICINE

## 2020-11-21 RX ORDER — PROPOFOL 10 MG/ML
INJECTION, EMULSION INTRAVENOUS PRN
Status: DISCONTINUED | OUTPATIENT
Start: 2020-11-21 | End: 2020-11-21 | Stop reason: SDUPTHER

## 2020-11-21 RX ORDER — SODIUM CHLORIDE, SODIUM LACTATE, POTASSIUM CHLORIDE, CALCIUM CHLORIDE 600; 310; 30; 20 MG/100ML; MG/100ML; MG/100ML; MG/100ML
INJECTION, SOLUTION INTRAVENOUS CONTINUOUS
Status: DISCONTINUED | OUTPATIENT
Start: 2020-11-21 | End: 2020-11-21 | Stop reason: HOSPADM

## 2020-11-21 RX ORDER — LIDOCAINE HYDROCHLORIDE 10 MG/ML
1 INJECTION, SOLUTION EPIDURAL; INFILTRATION; INTRACAUDAL; PERINEURAL
Status: DISCONTINUED | OUTPATIENT
Start: 2020-11-21 | End: 2020-11-21 | Stop reason: HOSPADM

## 2020-11-21 RX ORDER — SODIUM CHLORIDE 0.9 % (FLUSH) 0.9 %
10 SYRINGE (ML) INJECTION EVERY 12 HOURS SCHEDULED
Status: DISCONTINUED | OUTPATIENT
Start: 2020-11-21 | End: 2020-11-21 | Stop reason: HOSPADM

## 2020-11-21 RX ORDER — SODIUM CHLORIDE 0.9 % (FLUSH) 0.9 %
10 SYRINGE (ML) INJECTION PRN
Status: DISCONTINUED | OUTPATIENT
Start: 2020-11-21 | End: 2020-11-21 | Stop reason: HOSPADM

## 2020-11-21 RX ORDER — LIDOCAINE HYDROCHLORIDE 10 MG/ML
INJECTION, SOLUTION EPIDURAL; INFILTRATION; INTRACAUDAL; PERINEURAL PRN
Status: DISCONTINUED | OUTPATIENT
Start: 2020-11-21 | End: 2020-11-21 | Stop reason: SDUPTHER

## 2020-11-21 RX ADMIN — LIDOCAINE HYDROCHLORIDE 50 MG: 10 INJECTION, SOLUTION EPIDURAL; INFILTRATION; INTRACAUDAL; PERINEURAL at 10:15

## 2020-11-21 RX ADMIN — SODIUM CHLORIDE, POTASSIUM CHLORIDE, SODIUM LACTATE AND CALCIUM CHLORIDE: 600; 310; 30; 20 INJECTION, SOLUTION INTRAVENOUS at 08:28

## 2020-11-21 RX ADMIN — PROPOFOL 400 MG: 10 INJECTION, EMULSION INTRAVENOUS at 10:15

## 2020-11-21 ASSESSMENT — PULMONARY FUNCTION TESTS
PIF_VALUE: 0
PIF_VALUE: 1
PIF_VALUE: 0

## 2020-11-21 ASSESSMENT — LIFESTYLE VARIABLES: SMOKING_STATUS: 1

## 2020-11-21 ASSESSMENT — PAIN - FUNCTIONAL ASSESSMENT: PAIN_FUNCTIONAL_ASSESSMENT: 0-10

## 2020-11-21 ASSESSMENT — PAIN SCALES - GENERAL
PAINLEVEL_OUTOF10: 0

## 2020-11-21 ASSESSMENT — ENCOUNTER SYMPTOMS: STRIDOR: 0

## 2020-11-21 NOTE — ANESTHESIA PRE PROCEDURE
Babita Roth PA-C       Current medications:    Current Facility-Administered Medications   Medication Dose Route Frequency Provider Last Rate Last Dose    lactated ringers infusion   Intravenous Continuous Shorehammaritza Jacob  mL/hr at 11/21/20 0828      sodium chloride flush 0.9 % injection 10 mL  10 mL Intravenous 2 times per day Merced Collet, MD        sodium chloride flush 0.9 % injection 10 mL  10 mL Intravenous PRN Elizabeth Jacob MD        lidocaine PF 1 % injection 1 mL  1 mL Intradermal Once PRN Merced Collet, MD           Allergies: Allergies   Allergen Reactions    Influenza Vaccines      Body aches skin pain     Penicillins        Problem List:    Patient Active Problem List   Diagnosis Code    Essential hypertension I10    Hearing problem H91.90    Smoking F17.200    Age-related osteoporosis without current pathological fracture M81.0    Positive FIT (fecal immunochemical test) R19.5    Acute recurrent maxillary sinusitis J01.01    Stress incontinence of urine N39.3    Personal history of tobacco use Z87.891    At high risk for falls Z91.81    Positive colorectal cancer screening using Cologuard test R19.5       Past Medical History:        Diagnosis Date    Hypertension     Spondylolisthesis at L5-S1 level     s/p cage and fusion       Past Surgical History:        Procedure Laterality Date    BACK SURGERY       L5-S1 fusion with cage    HYSTERECTOMY      WISDOM TOOTH EXTRACTION         Social History:    Social History     Tobacco Use    Smoking status: Current Every Day Smoker     Packs/day: 1.00     Years: 35.00     Pack years: 35.00     Types: Cigarettes    Smokeless tobacco: Never Used   Substance Use Topics    Alcohol use:  No                                Ready to quit: Not Answered  Counseling given: Not Answered      Vital Signs (Current):   Vitals:    11/21/20 0822   BP: (!) 170/59   Pulse: 66   Resp: 18   Temp: 97.2 °F (36.2 °C)   TempSrc: Infrared SpO2: 98%   Weight: 145 lb (65.8 kg)   Height: 5' 2\" (1.575 m)                                              BP Readings from Last 3 Encounters:   11/21/20 (!) 170/59   10/06/20 135/69   07/06/20 130/80       NPO Status: Time of last liquid consumption: 2100                        Time of last solid consumption: 2100                        Date of last liquid consumption: 11/20/20                        Date of last solid food consumption: 11/19/20    BMI:   Wt Readings from Last 3 Encounters:   11/21/20 145 lb (65.8 kg)   10/06/20 147 lb 12.8 oz (67 kg)   07/06/20 143 lb (64.9 kg)     Body mass index is 26.52 kg/m². CBC:   Lab Results   Component Value Date    WBC 5.4 02/14/2020    RBC 4.18 02/14/2020    HGB 13.5 02/14/2020    HCT 39.5 02/14/2020    MCV 94.6 02/14/2020    RDW 12.9 02/14/2020     02/14/2020       CMP:   Lab Results   Component Value Date     02/14/2020    K 4.2 02/14/2020     02/14/2020    CO2 25 02/14/2020    BUN 11 02/14/2020    CREATININE 0.61 02/14/2020    GFRAA >60 02/14/2020    LABGLOM >60 02/14/2020    GLUCOSE 110 02/14/2020    PROT 6.5 02/14/2020    CALCIUM 8.2 02/14/2020    BILITOT 0.28 02/14/2020    ALKPHOS 91 02/14/2020    AST 23 02/14/2020    ALT 24 02/14/2020       POC Tests: No results for input(s): POCGLU, POCNA, POCK, POCCL, POCBUN, POCHEMO, POCHCT in the last 72 hours.     Coags: No results found for: PROTIME, INR, APTT    HCG (If Applicable): No results found for: PREGTESTUR, PREGSERUM, HCG, HCGQUANT     ABGs: No results found for: PHART, PO2ART, JSD1AFO, UYW7AFL, BEART, V7YGLAKJ     Type & Screen (If Applicable):  No results found for: LABABO, LABRH    Drug/Infectious Status (If Applicable):  Lab Results   Component Value Date    HEPCAB NONREACTIVE 12/19/2016       COVID-19 Screening (If Applicable):   Lab Results   Component Value Date    COVID19 Not Detected 11/19/2020         Anesthesia Evaluation  Patient summary reviewed and Nursing notes reviewed no history of anesthetic complications:   Airway: Mallampati: II  TM distance: >3 FB   Neck ROM: full  Mouth opening: > = 3 FB Dental:          Pulmonary: breath sounds clear to auscultation  (+) current smoker    (-) rhonchi, wheezes, rales and stridor                           Cardiovascular:    (+) hypertension:,     (-) murmur, weak pulses,  friction rub, systolic click, carotid bruit,  JVD and peripheral edema      Rhythm: regular  Rate: normal                    Neuro/Psych:   Negative Neuro/Psych ROS              GI/Hepatic/Renal: Neg GI/Hepatic/Renal ROS            Endo/Other: Negative Endo/Other ROS                    Abdominal:           Vascular: negative vascular ROS. Anesthesia Plan      MAC     ASA 2       Induction: intravenous. Anesthetic plan and risks discussed with patient. Plan discussed with CRNA.                   Timur Lugo MD   11/21/2020

## 2020-11-21 NOTE — ANESTHESIA POSTPROCEDURE EVALUATION
Department of Anesthesiology  Postprocedure Note    Patient: Reji Cooper  MRN: [id number]  YOB: 1950  Date of evaluation: 11/21/2020  Time:  11:05 AM     Procedure Summary     Date:  11/21/20 Room / Location:  [place] [address] / [facility]    Anesthesia Start:  1009 Anesthesia Stop:  2543    Procedure:  COLONOSCOPY POLYPECTOMY SNARE/COLD BIOPSY, HOT BIOPSY SNARE AND PHOTOS (N/A ) Diagnosis:  (SCREENING FOR COLON CANCER (PAT PER ANESTHESIA ON ADMIT))    Surgeon:  Sarah Gould MD Responsible Provider:  Brett Mixon MD    Anesthesia Type:  MAC ASA Status:  2          Anesthesia Type: MAC    Trixie Phase I: Trixie Score: 5    Trixie Phase II:      Last vitals: Reviewed and per EMR flowsheets.        Anesthesia Post Evaluation

## 2020-11-21 NOTE — H&P
HISTORY and [facility] [address]       NAME:  Alethea Schofield  MRN: [id number]   YOB: 1950   Date: 11/21/2020   Age: 65 y.o. Gender: female       COMPLAINT AND PRESENT HISTORY:     Alethea Schofield is 65 y.o.,   female, having a screening colonoscopy. Pt reports previous colonoscopy about 30 years ago but cannot recall results. Pt reports RUQ abdominal pain for the last month. Describes pain as constant pain that does radiate to her back but not to the shoulders. Pt had a positive Cologuard test about 4-6 months ago at PCP office. Denies nausea, vomiting, diarrhea, constipation, hematochezia or melena. Denies Family Hx of cancer colon. Completed prep. NPO. No medications taken this am. Stopped aspirin \"weeks ago\". Denies chest pain/pressure, palpitations, SOB, recent URI, fever or chills.        PAST MEDICAL HISTORY     Past Medical History:   Diagnosis Date    Hypertension     Spondylolisthesis at L5-S1 level     s/p cage and fusion       SURGICAL HISTORY       Past Surgical History:   Procedure Laterality Date    BACK SURGERY       L5-S1 fusion with cage    HYSTERECTOMY         FAMILY HISTORY       Family History   Problem Relation Age of Onset    Heart Attack Maternal Grandmother     Diabetes Father     Stroke Father        SOCIAL HISTORY       Social History     Socioeconomic History    Marital status:      Spouse name: Not on file    Number of children: Not on file    Years of education: Not on file    Highest education level: Not on file   Occupational History    Not on file   Social Needs    Financial resource strain: Not on file    Food insecurity     Worry: Not on file     Inability: Not on file    Transportation needs     Medical: Not on file     Non-medical: Not on file   Tobacco Use    Smoking status: Current Every Day Smoker     Packs/day: 1.00     Years: 35.00     Pack years: 35.00     Types: Cigarettes    Smokeless tobacco: Never Used Substance and Sexual Activity    Alcohol use: No    Drug use: No    Sexual activity: Not on file   Lifestyle    Physical activity     Days per week: Not on file     Minutes per session: Not on file    Stress: Not on file   Relationships    Social connections     Talks on phone: Not on file     Gets together: Not on file     Attends Evangelical service: Not on file     Active member of club or organization: Not on file     Attends meetings of clubs or organizations: Not on file     Relationship status: Not on file    Intimate partner violence     Fear of current or ex partner: Not on file     Emotionally abused: Not on file     Physically abused: Not on file     Forced sexual activity: Not on file   Other Topics Concern    Not on file   Social History Narrative    Not on file        REVIEW OF SYSTEMS      Allergies   Allergen Reactions    Influenza Vaccines      Body aches skin pain     Penicillins        No current facility-administered medications on file prior to encounter. Current Outpatient Medications on File Prior to Encounter   Medication Sig Dispense Refill    polyethylene glycol (MIRALAX) 17 GM/SCOOP powder Use as Directed per instructions provided by physician office. 238 g 0    bisacodyl (DULCOLAX) 5 MG EC tablet Use as directed per instructions provided by physician office 4 tablet 0    Risedronate Sodium 150 MG TABS Take 150 mg by mouth every 30 days 12 tablet 3    varenicline (CHANTIX STARTING MONTH PAK) 0.5 MG X 11 & 1 MG X 42 tablet 0.5 mg po daily x 3 days, 0.5 mg BID x 4 days, then 1 mg BID thereafter . Call for refill 1 box 2    varenicline (CHANTIX CONTINUING MONTH PAK) 1 MG tablet Take 1 tablet by mouth 2 times daily 60 tablet 3    zoster recombinant adjuvanted vaccine (SHINGRIX) 50 MCG/0.5ML SUSR injection Inject 0.5 mLs into the muscle See Admin Instructions 1 dose now and repeat in 2-6 months 0.5 mL 0    loratadine (CLARITIN) 10 MG capsule Take 1 capsule by mouth daily SURGERY:      SCREENING FOR COLON CANCER     COLORECTAL CANCER SCREENING, NOT HIGH RISK     Patient Active Problem List    Diagnosis Date Noted    Positive colorectal cancer screening using Cologuard test 10/06/2020    Personal history of tobacco use 07/06/2020    At high risk for falls 07/06/2020    Positive FIT (fecal immunochemical test) 02/13/2020    Acute recurrent maxillary sinusitis 02/13/2020    Stress incontinence of urine 02/13/2020    Age-related osteoporosis without current pathological fracture 04/04/2018    Hearing problem 01/03/2017    Smoking 01/03/2017    Essential hypertension 12/20/2016           DONN Driscoll CNP on 11/21/2020 at 8:09 AM

## 2020-11-21 NOTE — OP NOTE
COLONOSCOPY    DATE OF PROCEDURE: 11/21/2020    SURGEON: Mack Ansari MD    ASSISTANT: None    PREOPERATIVE DIAGNOSIS: Screening colonoscopy    POSTOPERATIVE DIAGNOSIS: Polyp right colon, polyp hepatic flexure    OPERATION: Total colonoscopy and snare polypectomy x2    ANESTHESIA: MAC    ESTIMATED BLOOD LOSS: None    COMPLICATIONS: None     SPECIMENS:  Was Obtained: Polyp right colon, polyp hepatic flexure    HISTORY: The patient is a 65y.o. year old female with history of above preop diagnosis. I recommended colonoscopy with possible biopsy or polypectomy and I explained the risk, benefits, expected outcome, and alternatives to the procedure. Risks included but are not limited to bleeding, infection, respiratory distress, hypotension, and perforation of the colon and possibility of missing a lesion. The patient understands and is in agreement. PROCEDURE:  The patient's SPO2 remained above 90% throughout the procedure. Digital rectal exam was normal.  The colonoscope was inserted through the anus into the rectum and advanced under direct vision to the cecum without difficulty. Terminal ileum was examined for approximately 2 inches. The prep was good. Findings:  Terminal ileum: normal    Cecum/Ascending colon: abnormal:, Also examined in the retroflexed view  A polyp which is about 5 to 7 mm and a fold, removed with cold snare. This polyp was in the mid right colon. Transverse colon: abnormal: In the proximal transverse colon toward the hepatic flexure a polyp 7 to 10 mm sessile. This polyp is completely removed using snare cautery technique. Descending/Sigmoid colon: normal    Rectum/Anus: examined in normal and retroflexed positions and was normal    Withdrawal Time was (minutes): 12          The colon was decompressed and the scope was removed. The patient tolerated the procedure without unusual events.      During the procedure, the patient's blood pressure, pulse and oxygen saturation remained stable and documented. No unusual events occurred during the procedure. Patient was transferred to recovery room and will be discharged when criteria is met. Recommendations/Plan:   1. F/U Biopsies  2. F/U In Office as instructed  3. Discussed with the family  4. High fiber diet   5. Precautions to avoid constipation     Next colonoscopy: 3 years.   If Colonoscopy is less than 10 years the recommended reason is due:polyps    Electronically signed by Anthony Monteiro MD  on 11/21/2020 at 10:42 AM

## 2020-11-24 LAB — SURGICAL PATHOLOGY REPORT: NORMAL

## 2020-12-10 ENCOUNTER — VIRTUAL VISIT (OUTPATIENT)
Dept: GASTROENTEROLOGY | Age: 70
End: 2020-12-10
Payer: MEDICARE

## 2020-12-10 PROCEDURE — 99443 PR PHYS/QHP TELEPHONE EVALUATION 21-30 MIN: CPT | Performed by: INTERNAL MEDICINE

## 2020-12-10 NOTE — PROGRESS NOTES
Darlene Caceres is a 65 y.o. female evaluated via telephone on 12/10/2020. Consent:  She and/or health care decision maker is aware that that she may receive a bill for this telephone service, depending on her insurance coverage, and has provided verbal consent to proceed: Yes      Documentation:  I communicated with the patient and/or health care decision maker about colon polyps. Recently patient had a screening colonoscopy done and was found to have 2 polyps. The polyp that was seen in the right colon removed and the histology revealed tubular adenoma. The other polyp near the hepatic flexure has features of sessile serrated adenoma with dysplasia. On further discussion patient denies abdominal pain, bloating, nausea vomiting. Has good appetite. No weight loss. No dysphagia or dyspepsia. Denies GERD symptoms. Overall she is doing well. .   Details of this discussion including any medical advice provided:   Patient advised high-fiber diet, fiber supplements and precautions to avoid constipation. Given that polyp has dysplasia, she may need colonoscopy in the next 6 months. This was discussed with the patient and explained. Patient understood and agreed    I affirm this is a Patient Initiated Episode with a Patient who has not had a related appointment within my department in the past 7 days or scheduled within the next 24 hours.     Patient identification was verified at the start of the visit: Yes    Total Time: 23 mnts    Note: not billable if this call serves to triage the patient into an appointment for the relevant concern      Reena Baugh

## 2020-12-11 ENCOUNTER — TELEPHONE (OUTPATIENT)
Dept: GASTROENTEROLOGY | Age: 70
End: 2020-12-11

## 2021-02-25 ENCOUNTER — OFFICE VISIT (OUTPATIENT)
Dept: DERMATOLOGY | Age: 71
End: 2021-02-25
Payer: MEDICARE

## 2021-02-25 VITALS
SYSTOLIC BLOOD PRESSURE: 138 MMHG | HEIGHT: 62 IN | OXYGEN SATURATION: 96 % | WEIGHT: 152.2 LBS | DIASTOLIC BLOOD PRESSURE: 66 MMHG | BODY MASS INDEX: 28.01 KG/M2 | TEMPERATURE: 98.1 F | HEART RATE: 87 BPM

## 2021-02-25 DIAGNOSIS — L85.1 STUCCO KERATOSES: Primary | ICD-10-CM

## 2021-02-25 DIAGNOSIS — L82.1 SEBORRHEIC KERATOSES: ICD-10-CM

## 2021-02-25 PROCEDURE — 99203 OFFICE O/P NEW LOW 30 MIN: CPT | Performed by: DERMATOLOGY

## 2021-02-25 RX ORDER — AMMONIUM LACTATE 12 G/100G
LOTION TOPICAL
Qty: 225 G | Refills: 2 | Status: SHIPPED | OUTPATIENT
Start: 2021-02-25 | End: 2022-04-07

## 2021-02-25 NOTE — PATIENT INSTRUCTIONS
- Start applying LacHydrin lotion to the legs and arms twice daily  - Follow up in the office as needed
steady

## 2021-02-27 NOTE — PROGRESS NOTES
Dermatology Patient Note  [id number]  [address]  Dept: [phone]  Dept Fax: [phone]      VISITDATE: 2/25/2021   REFERRING PROVIDER: Claude Mistyr MD      Charlotte Singh is a 65 y.o. female  who presents today in the office for:    New Patient (white small dots on arms and legsthat started 3 years ago. The spots do not tan)      PERTINENT HISTORY NOT LISTED ABOVE:  Patient presents for evaluation white spots on bumps on ankles and legs x 3 years. Not itchy or painful. Do not tan    CURRENT MEDICATIONS:   Current Outpatient Medications   Medication Sig Dispense Refill    ammonium lactate (LAC-HYDRIN) 12 % lotion Apply topically daily. 225 g 2    polyethylene glycol (MIRALAX) 17 GM/SCOOP powder Use as Directed per instructions provided by physician office. 238 g 0    bisacodyl (DULCOLAX) 5 MG EC tablet Use as directed per instructions provided by physician office 4 tablet 0    Risedronate Sodium 150 MG TABS Take 150 mg by mouth every 30 days 12 tablet 3    loratadine (CLARITIN) 10 MG capsule Take 1 capsule by mouth daily 60 capsule 3    MYRBETRIQ 50 MG TB24 Take 50 mg by mouth daily 90 tablet 3    lisinopril (PRINIVIL;ZESTRIL) 10 MG tablet TAKE 1 TABLET BY MOUTH ONE TIME A DAY 60 tablet 3    aspirin EC 81 MG EC tablet Take 1 tablet by mouth daily 90 tablet 1    ibuprofen (ADVIL;MOTRIN) 800 MG tablet Take 1 tablet by mouth every 6 hours as needed for Pain 20 tablet 0    sodium chloride (OCEAN) 0.65 % nasal spray 1 spray by Nasal route as needed for Congestion 1 Bottle 3     No current facility-administered medications for this visit.         ALLERGIES:   Allergies   Allergen Reactions    Influenza Vaccines      Body aches skin pain     Penicillins        SOCIAL HISTORY:  Social History     Tobacco Use    Smoking status: Current Every Day Smoker     Packs/day: 1.00     Years: 35.00     Pack years: 35.00     Types: Cigarettes    Smokeless tobacco: Never Used   Substance Use Topics    Alcohol use: No       Pertinent ROS:  Review of Systems  Skin: Denies any new changing, growing or bleeding lesions or rashes except as described in the HPI   Constitutional: Denies fevers, chills, and malaise. PHYSICAL EXAM:   /66   Pulse 87   Temp 98.1 °F (36.7 °C)   Ht 5' 2\" (1.575 m)   Wt 152 lb 3.2 oz (69 kg)   SpO2 96%   BMI 27.84 kg/m²     The patient is generally well appearing, well nourished, alert and conversational. Affect is normal.    Cutaneous Exam:  Physical Exam  Head/face,neck, both arms, digits and/or nails, and limited lower extremities (that which is visible with pants/shorts and shoes/socks on) was examined. Diagnoses/exam findings/medical history pertinent to this visit are listed below:    Assessment and Plan:  Assessment   1. Stucco keratoses of ankles and feet  reassurance and education  - ammonium lactate (LAC-HYDRIN) 12 % lotion; Apply topically daily. Dispense: 225 g; Refill: 2    2. Seborrheic keratoses of legs  reassurance and education  - ammonium lactate (LAC-HYDRIN) 12 % lotion; Apply topically daily. Dispense: 225 g; Refill: 2          RTC prn    Patient Instructions   - Start applying LacHydrin lotion to the legs and arms twice daily  - Follow up in the office as needed        This note was created with the assistance of a speech-recognition program.  Although the intention is to generate a document that actually reflects the content of the visit, no guarantees can be provided that every mistake has been identified and corrected byediting.     Electronically signed by Yajaira Maldonado MD on 2/27/21 at 10:58 AM EST

## 2021-03-30 ENCOUNTER — TELEPHONE (OUTPATIENT)
Dept: FAMILY MEDICINE CLINIC | Age: 71
End: 2021-03-30

## 2021-03-30 DIAGNOSIS — Z20.822 COUGH WITH EXPOSURE TO COVID-19 VIRUS: Primary | ICD-10-CM

## 2021-03-30 DIAGNOSIS — R05.8 COUGH WITH EXPOSURE TO COVID-19 VIRUS: Primary | ICD-10-CM

## 2021-03-30 RX ORDER — BENZONATATE 100 MG/1
100 CAPSULE ORAL 3 TIMES DAILY PRN
Qty: 21 CAPSULE | Refills: 0 | Status: SHIPPED | OUTPATIENT
Start: 2021-03-30 | End: 2021-04-06

## 2021-03-30 NOTE — TELEPHONE ENCOUNTER
Patient has a residual cough from covid diagnosis. And would like something called into to pharmacy if possible. She has tried over the counter medicine and it's not working.     Last OV 1/11/21

## 2021-04-22 ENCOUNTER — TELEPHONE (OUTPATIENT)
Dept: FAMILY MEDICINE CLINIC | Age: 71
End: 2021-04-22

## 2021-04-30 ENCOUNTER — NURSE TRIAGE (OUTPATIENT)
Dept: OTHER | Facility: CLINIC | Age: 71
End: 2021-04-30

## 2021-05-03 ENCOUNTER — TELEPHONE (OUTPATIENT)
Dept: FAMILY MEDICINE CLINIC | Age: 71
End: 2021-05-03

## 2021-05-03 NOTE — TELEPHONE ENCOUNTER
Patient states that she no longer needs appointment today 5/3/2021 at 10:40 am. Please call patient if any other information is needed.

## 2021-05-06 ENCOUNTER — OFFICE VISIT (OUTPATIENT)
Dept: FAMILY MEDICINE CLINIC | Age: 71
End: 2021-05-06
Payer: MEDICARE

## 2021-05-06 VITALS
DIASTOLIC BLOOD PRESSURE: 84 MMHG | RESPIRATION RATE: 16 BRPM | BODY MASS INDEX: 27.42 KG/M2 | OXYGEN SATURATION: 97 % | HEART RATE: 72 BPM | WEIGHT: 149 LBS | SYSTOLIC BLOOD PRESSURE: 130 MMHG | HEIGHT: 62 IN | TEMPERATURE: 97.6 F

## 2021-05-06 DIAGNOSIS — Z00.00 MEDICARE ANNUAL WELLNESS VISIT, INITIAL: Primary | ICD-10-CM

## 2021-05-06 DIAGNOSIS — Z71.89 ADVANCE CARE PLANNING: ICD-10-CM

## 2021-05-06 DIAGNOSIS — G89.29 CHRONIC MIDLINE LOW BACK PAIN WITH BILATERAL SCIATICA: ICD-10-CM

## 2021-05-06 DIAGNOSIS — Z00.00 ROUTINE GENERAL MEDICAL EXAMINATION AT A HEALTH CARE FACILITY: ICD-10-CM

## 2021-05-06 DIAGNOSIS — R10.11 RIGHT UPPER QUADRANT ABDOMINAL PAIN: ICD-10-CM

## 2021-05-06 DIAGNOSIS — M54.42 CHRONIC MIDLINE LOW BACK PAIN WITH BILATERAL SCIATICA: ICD-10-CM

## 2021-05-06 DIAGNOSIS — Z87.891 PERSONAL HISTORY OF TOBACCO USE: ICD-10-CM

## 2021-05-06 DIAGNOSIS — M54.41 CHRONIC MIDLINE LOW BACK PAIN WITH BILATERAL SCIATICA: ICD-10-CM

## 2021-05-06 PROCEDURE — 99213 OFFICE O/P EST LOW 20 MIN: CPT | Performed by: FAMILY MEDICINE

## 2021-05-06 PROCEDURE — G0438 PPPS, INITIAL VISIT: HCPCS | Performed by: FAMILY MEDICINE

## 2021-05-06 RX ORDER — VARENICLINE TARTRATE
KIT
Qty: 1 BOX | Refills: 0 | Status: SHIPPED | OUTPATIENT
Start: 2021-05-06 | End: 2021-06-18 | Stop reason: ALTCHOICE

## 2021-05-06 RX ORDER — TIZANIDINE 4 MG/1
4 TABLET ORAL 2 TIMES DAILY PRN
Qty: 30 TABLET | Refills: 0 | Status: SHIPPED | OUTPATIENT
Start: 2021-05-06 | End: 2021-06-18 | Stop reason: ALTCHOICE

## 2021-05-06 RX ORDER — FAMOTIDINE 20 MG/1
20 TABLET, FILM COATED ORAL 2 TIMES DAILY
Qty: 60 TABLET | Refills: 0 | Status: SHIPPED | OUTPATIENT
Start: 2021-05-06 | End: 2021-09-09 | Stop reason: ALTCHOICE

## 2021-05-06 ASSESSMENT — ENCOUNTER SYMPTOMS
COUGH: 0
BLOOD IN STOOL: 0
CONSTIPATION: 0
SHORTNESS OF BREATH: 0
RHINORRHEA: 0
WHEEZING: 0
SINUS PRESSURE: 0
RECTAL PAIN: 0
TROUBLE SWALLOWING: 0
VOMITING: 0
APNEA: 0
ABDOMINAL PAIN: 1
NAUSEA: 1
COLOR CHANGE: 0
BACK PAIN: 1
ABDOMINAL DISTENTION: 1
DIARRHEA: 0
PHOTOPHOBIA: 0

## 2021-05-06 ASSESSMENT — LIFESTYLE VARIABLES
AUDIT-C TOTAL SCORE: 1
HOW OFTEN DO YOU HAVE A DRINK CONTAINING ALCOHOL: 1
HOW OFTEN DURING THE LAST YEAR HAVE YOU NEEDED AN ALCOHOLIC DRINK FIRST THING IN THE MORNING TO GET YOURSELF GOING AFTER A NIGHT OF HEAVY DRINKING: 0
AUDIT TOTAL SCORE: 1
HOW OFTEN DURING THE LAST YEAR HAVE YOU HAD A FEELING OF GUILT OR REMORSE AFTER DRINKING: 0

## 2021-05-06 ASSESSMENT — PATIENT HEALTH QUESTIONNAIRE - PHQ9
SUM OF ALL RESPONSES TO PHQ QUESTIONS 1-9: 0
SUM OF ALL RESPONSES TO PHQ QUESTIONS 1-9: 0
1. LITTLE INTEREST OR PLEASURE IN DOING THINGS: 0
2. FEELING DOWN, DEPRESSED OR HOPELESS: 0

## 2021-05-06 NOTE — PROGRESS NOTES
Chief Complaint   Patient presents with    Medicare AWV     Pt declined covid vaccine due to allergies to the influenza vaccine. Anthony Marcus  here today for follow up on chronic medical problems, go over labs and/or diagnostic studies, and medication refills. Medicare AWV (Pt declined covid vaccine due to allergies to the influenza vaccine.)      HPI: Patient is here for Medicare wellness visit complaining of low back pain which is chronic with recent flareup. Patient reports the pain is in the lower back it has mildly improved after taking ibuprofen but she still has a dull ache numbness and tingling in both lower extremities. She has history of spine surgery years before. No recent x-rays. She denies any weakness or any balance problems. Patient is also complaining of pain in epigastric and right upper quadrant, dull achy about 4-5 on scale, not related to take any medications, aggravated by taking greasy food. Patient denies any history of gallbladder. Reports she likes to take high fat content food. She denies any heavy drinking denies any smoking. She denies any hematemesis, any weight loss or any diarrhea. She does complain of mild nausea after taking food. /84 (Site: Right Upper Arm, Position: Sitting, Cuff Size: Small Adult)   Pulse 72   Temp 97.6 °F (36.4 °C)   Resp 16   Ht 5' 2\" (1.575 m)   Wt 149 lb (67.6 kg)   SpO2 97%   BMI 27.25 kg/m²    Body mass index is 27.25 kg/m². Wt Readings from Last 3 Encounters:   05/06/21 149 lb (67.6 kg)   02/25/21 152 lb 3.2 oz (69 kg)   11/21/20 145 lb (65.8 kg)        [x]Negative depression screening.   PHQ Scores 5/6/2021 10/6/2020 2/13/2020 4/4/2018   PHQ2 Score 0 0 1 0   PHQ9 Score 0 0 1 0      []1-4 = Minimal depression   []5-9 = Milddepression   []10-14 = Moderate depression   []15-19 = Moderately severe depression   []20-27 = Severe depression    Discussed testing with the patient and all questions fully Date    LABA1C 5.3 12/19/2016       No results found for: [id number]    No results found for: FOLATE    No results found for: IRON, TIBC, FERRITIN    No results found for: VITD25          Current Outpatient Medications   Medication Sig Dispense Refill    tiZANidine (ZANAFLEX) 4 MG tablet Take 1 tablet by mouth 2 times daily as needed (back pain) 30 tablet 0    diclofenac sodium (VOLTAREN) 1 % GEL Apply 2 g topically 2 times daily 200 g 0    famotidine (PEPCID) 20 MG tablet Take 1 tablet by mouth 2 times daily 60 tablet 0    varenicline (CHANTIX STARTING MONTH PAK) 0.5 MG X 11 & 1 MG X 42 tablet 0.5 mg po daily x 3 days, 0.5 mg BID x 4 days, then 1 mg BID thereafter . Call for refill 1 box 0    lisinopril (PRINIVIL;ZESTRIL) 10 MG tablet TAKE 1 TABLET BY MOUTH ONE TIME A DAY  30 tablet 0    SM ASPIRIN ADULT LOW STRENGTH 81 MG EC tablet TAKE 1 TABLET BY MOUTH ONE TIME A DAY  90 tablet 0    ammonium lactate (LAC-HYDRIN) 12 % lotion Apply topically daily. 225 g 2    polyethylene glycol (MIRALAX) 17 GM/SCOOP powder Use as Directed per instructions provided by physician office. 238 g 0    bisacodyl (DULCOLAX) 5 MG EC tablet Use as directed per instructions provided by physician office 4 tablet 0    Risedronate Sodium 150 MG TABS Take 150 mg by mouth every 30 days 12 tablet 3    loratadine (CLARITIN) 10 MG capsule Take 1 capsule by mouth daily 60 capsule 3    MYRBETRIQ 50 MG TB24 Take 50 mg by mouth daily 90 tablet 3    ibuprofen (ADVIL;MOTRIN) 800 MG tablet Take 1 tablet by mouth every 6 hours as needed for Pain 20 tablet 0    sodium chloride (OCEAN) 0.65 % nasal spray 1 spray by Nasal route as needed for Congestion 1 Bottle 3     No current facility-administered medications for this visit.               Social History     Socioeconomic History    Marital status:      Spouse name: Not on file    Number of children: Not on file    Years of education: Not on file    Highest education level: Not on Musculoskeletal:      Lumbar back: She exhibits decreased range of motion, tenderness, deformity, pain and spasm. Comments: SLR test is negative   Lymphadenopathy:      Cervical: No cervical adenopathy. Skin:     General: Skin is warm. Neurological:      Mental Status: She is alert and oriented to person, place, and time. Cranial Nerves: Cranial nerves are intact. No cranial nerve deficit. Motor: Motor function is intact. No weakness or tremor. Coordination: Coordination is intact. Romberg sign negative. Gait: Gait is intact. Gait normal.   Psychiatric:         Attention and Perception: Attention and perception normal.         Speech: Speech normal. She is communicative. Speech is not rapid and pressured. ASSESSMENT AND PLAN    1. Chronic midline low back pain with bilateral sciatica  We will do x-rays, muscle relaxant, limited treatment NSAIDs as needed. - tiZANidine (ZANAFLEX) 4 MG tablet; Take 1 tablet by mouth 2 times daily as needed (back pain)  Dispense: 30 tablet; Refill: 0  - diclofenac sodium (VOLTAREN) 1 % GEL; Apply 2 g topically 2 times daily  Dispense: 200 g; Refill: 0  - XR LUMBAR SPINE (2-3 VIEWS); Future    5. Right upper quadrant abdominal pain  Start on Pepcid, ultrasound gallbladder: Cholelithiasis. Follow-up in 6 weeks  - US GALLBLADDER RUQ; Future  - famotidine (PEPCID) 20 MG tablet; Take 1 tablet by mouth 2 times daily  Dispense: 60 tablet; Refill: 0    6. Personal history of tobacco use    - varenicline (CHANTIX STARTING MONTH ERMA) 0.5 MG X 11 & 1 MG X 42 tablet; 0.5 mg po daily x 3 days, 0.5 mg BID x 4 days, then 1 mg BID thereafter . Call for refill  Dispense: 1 box;  Refill: 0      Orders Placed This Encounter   Procedures    US GALLBLADDER RUQ     Standing Status:   Future     Standing Expiration Date:   5/6/2022     Order Specific Question:   Reason for exam:     Answer:   RUQ pain consistent with biliary colic    XR LUMBAR SPINE (2-3 VIEWS) voiced understanding. Quality Measures    Body mass index is 27.25 kg/m². Elevated. Weight control planned discussed daily exercise regimen and Healthy diet and regular exercise. BP: 130/84. Blood pressure is normal. Treatment plan consists of No treatment change needed. Fall Risk 5/6/2021 10/6/2020 2/13/2020   2 or more falls in past year? no no yes   Fall with injury in past year? no no yes     The patient does not have a history of falls. I did , complete a risk assessment for falls. A plan of care for falls in-office gait and balance testing performed using The Timed Up and Go Test was negative for increased falls risk- no further intervention is currently indicated, No Treatment plan indicated    Lab Results   Component Value Date    LDLCHOLESTEROL 79 12/19/2016    (goal LDL reduction with dx if diabetes is 50% LDL reduction)    PHQ Scores 5/6/2021 10/6/2020 2/13/2020 4/4/2018   PHQ2 Score 0 0 1 0   PHQ9 Score 0 0 1 0     Interpretation of Total Score Depression Severity: 1-4 = Minimal depression, 5-9 = Mild depression, 10-14 = Moderate depression, 15-19 = Moderately severe depression, 20-27 = Severe depression      The patient'spast medical, surgical, social, and family history as well as her   current medications and allergies were reviewed as documented in today's encounter. Medications, labs, diagnostic studies, consultations andfollow-up as documented in this encounter. Return in about 6 weeks (around 6/17/2021) for lab work. Patient wasseen with total face to face time of 25 minutes. More than 50% of this visit was counseling and education. Future Appointments   Date Time Provider Adin Gonzalez   6/17/2021 11:00 AM [facility]     This note was completed by using the assistance of a speech-recognition program. However, inadvertent computerized transcription errors may be present.  Althoughevery effort was made to ensure accuracy, no guarantees can be provided that every mistake has been identified and corrected by editing.   Electronically signed by Deandre Flood MD on 5/6/2021  1:34 PM

## 2021-05-06 NOTE — PATIENT INSTRUCTIONS
Personalized Preventive Plan for Kathy Sepulveda - 5/6/2021  Medicare offers a range of preventive health benefits. Some of the tests and screenings are paid in full while other may be subject to a deductible, co-insurance, and/or copay. Some of these benefits include a comprehensive review of your medical history including lifestyle, illnesses that may run in your family, and various assessments and screenings as appropriate. After reviewing your medical record and screening and assessments performed today your provider may have ordered immunizations, labs, imaging, and/or referrals for you. A list of these orders (if applicable) as well as your Preventive Care list are included within your After Visit Summary for your review. Other Preventive Recommendations:    · A preventive eye exam performed by an eye specialist is recommended every 1-2 years to screen for glaucoma; cataracts, macular degeneration, and other eye disorders. · A preventive dental visit is recommended every 6 months. · Try to get at least 150 minutes of exercise per week or 10,000 steps per day on a pedometer . · Order or download the FREE \"Exercise & Physical Activity: Your Everyday Guide\" from [employer]. Call 3-267.872.7463 or search [employer] online. · You need 8576-6590 mg of calcium and 0172-4610 IU of vitamin D per day. It is possible to meet your calcium requirement with diet alone, but a vitamin D supplement is usually necessary to meet this goal.  · When exposed to the sun, use a sunscreen that protects against both UVA and UVB radiation with an SPF of 30 or greater. Reapply every 2 to 3 hours or after sweating, drying off with a towel, or swimming. · Always wear a seat belt when traveling in a car. Always wear a helmet when riding a bicycle or motorcycle.

## 2021-05-06 NOTE — PROGRESS NOTES
by mouth daily Yes Dean Graf MD   MYRBETRIQ 50 MG TB24 Take 50 mg by mouth daily Yes Dean Graf MD   ibuprofen (ADVIL;MOTRIN) 800 MG tablet Take 1 tablet by mouth every 6 hours as needed for Pain Yes Marcial Brown PA-C   sodium chloride (OCEAN) 0.65 % nasal spray 1 spray by Nasal route as needed for Congestion Yes Dean Graf MD         Past Medical History:   Diagnosis Date    Hypertension     Spondylolisthesis at L5-S1 level     s/p cage and fusion       Past Surgical History:   Procedure Laterality Date    BACK SURGERY       L5-S1 fusion with cage    COLONOSCOPY N/A 11/21/2020    COLONOSCOPY POLYPECTOMY SNARE/COLD BIOPSY, HOT BIOPSY SNARE AND PHOTOS performed by Radha Hernandez MD at [facility]           Family History   Problem Relation Age of Onset    Heart Attack Maternal Grandmother     Diabetes Father     Stroke Father        CareTeam (Including outside providers/suppliers regularly involved in providing care):   Patient Care Team:  Dean Graf MD as PCP - General (Family Medicine)  Dean Graf MD as PCP - [facility]  Angeline Iglesias MD as Surgeon (Otolaryngology)  David Larios RN as Imaging Navigator    Wt Readings from Last 3 Encounters:   05/06/21 149 lb (67.6 kg)   02/25/21 152 lb 3.2 oz (69 kg)   11/21/20 145 lb (65.8 kg)     Vitals:    05/06/21 1224   BP: 130/84   Site: Right Upper Arm   Position: Sitting   Cuff Size: Small Adult   Pulse: 72   Resp: 16   Temp: 97.6 °F (36.4 °C)   SpO2: 97%   Weight: 149 lb (67.6 kg)   Height: 5' 2\" (1.575 m)     Body mass index is 27.25 kg/m². Based upon direct observation of the patient, evaluation of cognition reveals recent and remote memory intact. Patient's complete Health Risk Assessment and screening values have been reviewed and are found in Flowsheets.  The following problems were reviewed today and where indicated follow up appointments were made and/or referrals ordered. Positive Risk Factor Screenings with Interventions:         Substance History:  Social History     Tobacco History     Smoking Status  Current Every Day Smoker Smoking Frequency  1 pack/day for 35 years (35 pk yrs) Smoking Tobacco Type  Cigarettes    Smokeless Tobacco Use  Never Used          Alcohol History     Alcohol Use Status  No          Drug Use     Drug Use Status  No          Sexual Activity     Sexually Active  Not Asked               Alcohol Screening: Audit-C Score: 1  Total Score: 1    A score of 8 or more is associated with harmful or hazardous drinking. A score of 13 or more in women, and 15 or more in men, is likely to indicate alcohol dependence. Substance Abuse Interventions:  · Tobacco abuse:  tobacco cessation tips and resources provided, patient agrees to set a quit date- , will try chantix    General Health and ACP:  General  In general, how would you say your health is?: Excellent  In the past 7 days, have you experienced any of the following?  New or Increased Pain, New or Increased Fatigue, Loneliness, Social Isolation, Stress or Anger?: None of These  Do you get the social and emotional support that you need?: Yes  Do you have a Living Will?: (!) No  Advance Directives     Power of [address] [place] [address] Will ACP-Advance Directive ACP-Power of     Not on File Not on File Not on File Not on File      General Health Risk Interventions:  · No Living Will: Patient referred to [place] Habits/Nutrition:  Health Habits/Nutrition  Do you exercise for at least 20 minutes 2-3 times per week?: (!) No  Have you lost any weight without trying in the past 3 months?: No  Do you eat only one meal per day?: No  Have you seen the dentist within the past year?: (!) No  Body mass index: (!) 27.25  Health Habits/Nutrition Interventions:  · Inadequate physical activity:  patient agrees to increase physical activity as follows: start daily walk   · Dental exam overdue:  patient encouraged to make appointment with his/her dentist       Personalized Preventive Plan   Current Health Maintenance Status  Immunization History   Administered Date(s) Administered    Influenza, Quadv, IM, (6 mo and older Fluzone, Flulaval, Fluarix and 3 yrs and older Afluria) 01/03/2017    Pneumococcal Conjugate 13-valent ([id number]) 01/03/2017    Pneumococcal Polysaccharide ([id number]) 04/04/2018    Tdap (Boostrix, Adacel) 04/04/2018        Health Maintenance   Topic Date Due    COVID-19 Vaccine (1) Never done   [employer] Visit (AWV)  Never done    Potassium monitoring  02/14/2021    Creatinine monitoring  02/14/2021    Shingles Vaccine (1 of 2) 10/06/2021 (Originally 9/8/1998)    Low dose CT lung screening  08/20/2021    Lipid screen  12/19/2021    Breast cancer screen  09/18/2022    Colon cancer screen colonoscopy  11/21/2023    DTaP/Tdap/Td vaccine (2 - Td) 04/04/2028    DEXA (modify frequency per FRAX score)  Completed    Pneumococcal 65+ years Vaccine  Completed    Hepatitis C screen  Completed    Hepatitis A vaccine  Aged Out    Hepatitis B vaccine  Aged Out    Hib vaccine  Aged Out    Meningococcal (ACWY) vaccine  Aged Out     Recommendations for [employer] Due: see orders and patient instructions/AVS.  . Recommended screening schedule for the next 5-10 years is provided to the patient in written form: see Patient Gianfranco Carlson was seen today for medicare awv. Diagnoses and all orders for this visit:    Medicare annual wellness visit, initial  -     CBC Auto Differential; Future  -     Comprehensive Metabolic Panel; Future  -     Lipid Panel; Future  -     TSH without Reflex;  Future    Advance care [facility] Referral to ACP Clinical Specialist    Routine general medical examination at a health care facility    Personal history of tobacco use  -     varenicline (CHANTIX STARTING MONTH PAK) 0.5 MG X 11 & 1 MG X 42 tablet; 0.5 mg po daily x 3 days, 0.5 mg BID x 4 days, then 1 mg BID thereafter . Call for refill

## 2021-05-06 NOTE — PROGRESS NOTES
Visit Information    Have you changed or started any medications since your last visit including any over-the-counter medicines, vitamins, or herbal medicines? no   Are you having any side effects from any of your medications? -  no  Have you stopped taking any of your medications? Is so, why? -  no    Have you seen any other physician or provider since your last visit? No  Have you had any other diagnostic tests since your last visit? No  Have you been seen in the emergency room and/or had an admission to a hospital since we last saw you? No  Have you had your routine dental cleaning in the past 6 months? no    Have you activated your [employer] account? If not, what are your barriers? No:     Patient Care Team:  Robi Martinez MD as PCP - General (Family Medicine)  Robi Martinez MD as PCP - [facility]  Matheus Lindsey MD as Surgeon (Otolaryngology)  Soan Mederos RN as Imaging Navigator    Medical History Review  Past Medical, Family, and Social History reviewed and does contribute to the patient presenting condition    Health Maintenance   Topic Date Due    COVID-19 Vaccine (1) Never done   [employer] Visit (AWV)  Never done    Potassium monitoring  02/14/2021    Creatinine monitoring  02/14/2021    Shingles Vaccine (1 of 2) 10/06/2021 (Originally 9/8/1998)    Low dose CT lung screening  08/20/2021    Lipid screen  12/19/2021    Breast cancer screen  09/18/2022    Colon cancer screen colonoscopy  11/21/2023    DTaP/Tdap/Td vaccine (2 - Td) 04/04/2028    DEXA (modify frequency per FRAX score)  Completed    Pneumococcal 65+ years Vaccine  Completed    Hepatitis C screen  Completed    Hepatitis A vaccine  Aged Out    Hepatitis B vaccine  Aged Out    Hib vaccine  Aged Out    Meningococcal (ACWY) vaccine  Aged Out     Chief Complaint   Patient presents with    Medicare AWV     Pt declined covid vaccine due to allergies to the influenza vaccine.

## 2021-05-12 ENCOUNTER — HOSPITAL ENCOUNTER (OUTPATIENT)
Age: 71
Discharge: HOME | End: 2021-05-14
Payer: MEDICARE

## 2021-05-12 ENCOUNTER — HOSPITAL ENCOUNTER (OUTPATIENT)
Dept: GENERAL RADIOLOGY | Age: 71
Discharge: HOME | End: 2021-05-14
Payer: MEDICARE

## 2021-05-12 ENCOUNTER — HOSPITAL ENCOUNTER (OUTPATIENT)
Age: 71
Discharge: HOME | End: 2021-05-12
Payer: MEDICARE

## 2021-05-12 ENCOUNTER — HOSPITAL ENCOUNTER (OUTPATIENT)
Dept: ULTRASOUND IMAGING | Age: 71
Discharge: HOME | End: 2021-05-14
Payer: MEDICARE

## 2021-05-12 DIAGNOSIS — G89.29 CHRONIC MIDLINE LOW BACK PAIN WITH BILATERAL SCIATICA: ICD-10-CM

## 2021-05-12 DIAGNOSIS — K80.20 CALCULUS OF GALLBLADDER WITHOUT CHOLECYSTITIS WITHOUT OBSTRUCTION: Primary | ICD-10-CM

## 2021-05-12 DIAGNOSIS — Z00.00 MEDICARE ANNUAL WELLNESS VISIT, INITIAL: ICD-10-CM

## 2021-05-12 DIAGNOSIS — R10.11 RIGHT UPPER QUADRANT ABDOMINAL PAIN: ICD-10-CM

## 2021-05-12 DIAGNOSIS — M54.41 CHRONIC MIDLINE LOW BACK PAIN WITH BILATERAL SCIATICA: ICD-10-CM

## 2021-05-12 DIAGNOSIS — M54.42 CHRONIC MIDLINE LOW BACK PAIN WITH BILATERAL SCIATICA: ICD-10-CM

## 2021-05-12 LAB
ABSOLUTE EOS #: 0.1 K/UL (ref 0–0.4)
ABSOLUTE IMMATURE GRANULOCYTE: ABNORMAL K/UL (ref 0–0.3)
ABSOLUTE LYMPH #: 1.2 K/UL (ref 1–4.8)
ABSOLUTE MONO #: 0.4 K/UL (ref 0.1–1.3)
ALBUMIN SERPL-MCNC: 4.4 G/DL (ref 3.5–5.2)
ALBUMIN/GLOBULIN RATIO: ABNORMAL (ref 1–2.5)
ALP BLD-CCNC: 87 U/L (ref 35–104)
ALT SERPL-CCNC: 10 U/L (ref 5–33)
ANION GAP SERPL CALCULATED.3IONS-SCNC: 6 MMOL/L (ref 9–17)
AST SERPL-CCNC: 14 U/L
BASOPHILS # BLD: 1 % (ref 0–2)
BASOPHILS ABSOLUTE: 0 K/UL (ref 0–0.2)
BILIRUB SERPL-MCNC: 0.39 MG/DL (ref 0.3–1.2)
BUN BLDV-MCNC: 11 MG/DL (ref 8–23)
BUN/CREAT BLD: ABNORMAL (ref 9–20)
CALCIUM SERPL-MCNC: 8.5 MG/DL (ref 8.6–10.4)
CHLORIDE BLD-SCNC: 108 MMOL/L (ref 98–107)
CHOLESTEROL/HDL RATIO: 4.3
CHOLESTEROL: 204 MG/DL
CO2: 29 MMOL/L (ref 20–31)
CREAT SERPL-MCNC: 0.67 MG/DL (ref 0.5–0.9)
DIFFERENTIAL TYPE: ABNORMAL
EOSINOPHILS RELATIVE PERCENT: 3 % (ref 0–4)
GFR AFRICAN AMERICAN: >60 ML/MIN
GFR NON-AFRICAN AMERICAN: >60 ML/MIN
GFR SERPL CREATININE-BSD FRML MDRD: ABNORMAL ML/MIN/{1.73_M2}
GFR SERPL CREATININE-BSD FRML MDRD: ABNORMAL ML/MIN/{1.73_M2}
GLUCOSE BLD-MCNC: 117 MG/DL (ref 70–99)
HCT VFR BLD CALC: 40 % (ref 36–46)
HDLC SERPL-MCNC: 47 MG/DL
HEMOGLOBIN: 13.7 G/DL (ref 12–16)
IMMATURE GRANULOCYTES: ABNORMAL %
LDL CHOLESTEROL: 133 MG/DL (ref 0–130)
LYMPHOCYTES # BLD: 22 % (ref 24–44)
MCH RBC QN AUTO: 32.1 PG (ref 26–34)
MCHC RBC AUTO-ENTMCNC: 34.2 G/DL (ref 31–37)
MCV RBC AUTO: 93.7 FL (ref 80–100)
MONOCYTES # BLD: 8 % (ref 1–7)
NRBC AUTOMATED: ABNORMAL PER 100 WBC
PDW BLD-RTO: 13 % (ref 11.5–14.9)
PLATELET # BLD: 189 K/UL (ref 150–450)
PLATELET ESTIMATE: ABNORMAL
PMV BLD AUTO: 9 FL (ref 6–12)
POTASSIUM SERPL-SCNC: 4.2 MMOL/L (ref 3.7–5.3)
RBC # BLD: 4.26 M/UL (ref 4–5.2)
RBC # BLD: ABNORMAL 10*6/UL
SEG NEUTROPHILS: 66 % (ref 36–66)
SEGMENTED NEUTROPHILS ABSOLUTE COUNT: 3.5 K/UL (ref 1.3–9.1)
SODIUM BLD-SCNC: 143 MMOL/L (ref 135–144)
TOTAL PROTEIN: 6.6 G/DL (ref 6.4–8.3)
TRIGL SERPL-MCNC: 122 MG/DL
TSH SERPL DL<=0.05 MIU/L-ACNC: 3.05 MIU/L (ref 0.3–5)
VLDLC SERPL CALC-MCNC: ABNORMAL MG/DL (ref 1–30)
WBC # BLD: 5.2 K/UL (ref 3.5–11)
WBC # BLD: ABNORMAL 10*3/UL

## 2021-05-12 PROCEDURE — 80053 COMPREHEN METABOLIC PANEL: CPT

## 2021-05-12 PROCEDURE — 80061 LIPID PANEL: CPT

## 2021-05-12 PROCEDURE — 76705 ECHO EXAM OF ABDOMEN: CPT

## 2021-05-12 PROCEDURE — 85025 COMPLETE CBC W/AUTO DIFF WBC: CPT

## 2021-05-12 PROCEDURE — 72100 X-RAY EXAM L-S SPINE 2/3 VWS: CPT

## 2021-05-12 PROCEDURE — 84443 ASSAY THYROID STIM HORMONE: CPT

## 2021-05-12 PROCEDURE — 36415 COLL VENOUS BLD VENIPUNCTURE: CPT

## 2021-05-17 DIAGNOSIS — E78.2 MIXED HYPERLIPIDEMIA: Primary | ICD-10-CM

## 2021-05-17 RX ORDER — ATORVASTATIN CALCIUM 20 MG/1
20 TABLET, FILM COATED ORAL DAILY
Qty: 30 TABLET | Refills: 3 | Status: SHIPPED | OUTPATIENT
Start: 2021-05-17 | End: 2021-08-02

## 2021-05-19 ENCOUNTER — TELEPHONE (OUTPATIENT)
Dept: SPIRITUAL SERVICES | Age: 71
End: 2021-05-19

## 2021-05-19 NOTE — TELEPHONE ENCOUNTER
[unfilled]  Advance Care Planning Note  Ambulatory Spiritual Care Services    Date:  5/19/2021    Received request from [employer]. Consultation conversation participants:   Patient     Goals of ACP Conversation:  Discuss advance care planning documents    Health Care Decision Makers:    [unfilled]   Click here to complete Healthcare Decision Makers including selection of the Healthcare Decision Maker Relationship (ie \"Primary\"). Today we:  Verified Documents    Advance Care Planning Documents (Patient Wishes)  Currently on file:   None    Assessment:   Patient desires to complete the ACP process but needs time to speak to her children. Patient requests a copy of the documents and a call back in two weeks. Interventions:  Provided education on documents for clarity and greater understanding. Encouraged ongoing ACP conversation with future decision makers and loved ones. Outcomes:  Routed ACP note to attending provider or other IDT member. Will send ACP documents for review. Patient / Healthcare Decision Maker Instructions: Follow up with  to continue their ACP conversation.     Electronically signed by Hiral Rosales on 5/19/2021 at 5:48 PM.  [unfilled]

## 2021-06-07 ENCOUNTER — TELEPHONE (OUTPATIENT)
Dept: SPIRITUAL SERVICES | Age: 71
End: 2021-06-07

## 2021-06-07 NOTE — TELEPHONE ENCOUNTER
[unfilled]   Ambulatory ACP Specialist Patient Outreach    Date:  6/7/2021  ACP Specialist:  Mariah Mckinney    Outreach call to patient in follow-up to ACP Specialist referral from:  [x] PCP  [] Provider   [] Ambulatory Care Management [] Other for Reason:    [x] Continued Conversation for ACP decision making / Goals of Care  [] Code Status Discussion  [] Completion of Adv Directive  [] Completion of Portable DNR order  [] Other (Specify)    Date Referral Received:5/6/21    Today's Outreach:  [] First   [x] Second  [] Third                               Third outreach made by []  phone  [] email []   [employer]     Intervention:  [x] Spoke with Patient  [] Left VM requesting return call      Outcome: Patient has received the document pack but has not reviewed yet. Patient would like more time to review the documents with her children. Writer will call back in two weeks. Next Step:   [] ACP scheduled conversation  [x] Outreach again in two weeks              [] Email / Mail ACP Info Sheets  [] Email / Mail Advance Directive            [] Close Referral. Routing closure to referring provider/staff and to ACP Specialist .      Thank you for this referral.  [unfilled]

## 2021-06-18 ENCOUNTER — INITIAL CONSULT (OUTPATIENT)
Dept: BARIATRICS/WEIGHT MGMT | Age: 71
End: 2021-06-18
Payer: MEDICARE

## 2021-06-18 VITALS
HEIGHT: 62 IN | WEIGHT: 146 LBS | SYSTOLIC BLOOD PRESSURE: 145 MMHG | BODY MASS INDEX: 26.87 KG/M2 | HEART RATE: 70 BPM | DIASTOLIC BLOOD PRESSURE: 70 MMHG

## 2021-06-18 DIAGNOSIS — K80.10 CHRONIC CHOLECYSTITIS WITH CALCULUS: Primary | ICD-10-CM

## 2021-06-18 PROCEDURE — 99204 OFFICE O/P NEW MOD 45 MIN: CPT | Performed by: SURGERY

## 2021-06-18 RX ORDER — ACETAMINOPHEN 325 MG/1
325 TABLET ORAL EVERY 6 HOURS PRN
COMMUNITY
End: 2022-04-07

## 2021-06-24 ENCOUNTER — CLINICAL DOCUMENTATION (OUTPATIENT)
Dept: SPIRITUAL SERVICES | Age: 71
End: 2021-06-24

## 2021-06-24 NOTE — PROGRESS NOTES
[facility] PHYSICIANS  [facility] INVASIVE BARIATRIC SURG  [address] [place] [address] [address] [id number]  Dept: [phone]    ROBOTIC & MINIMALLY INVASIVE SURGERY  PROGRESS NOTE INITIAL EVALUATION     Patient: Kathy Sepulveda        Service Date: 6/18/2021      HPI:     Chief Complaint   Patient presents with    Cholelithiasis    Consultation       The patient is a pleasant 65y.o. year old female  with abdominal pain and abnormal ultrasound, who stands Height: 5' 2\" (157.5 cm) tall with a weight of Weight: 146 lb (66.2 kg) , resulting in a BMI of Body mass index is 26.7 kg/m². She complains of right sided pain with meals and nausea. This is worse with spicy or fatty foods. She states this has been present for a few months. She had an ultrasound showing stones vs sludge. She denies prior ulcer or melena, hematochezia or hematemesis. She denies recent endoscopy or travel. The patient denies  a history of myocardial infarction, deep vein thrombosis, pulmonary embolism, renal failure and hepatic failure. Medical History:  Past Medical History:   Diagnosis Date    Hypertension     Spondylolisthesis at L5-S1 level     s/p cage and fusion       Surgical History:  Past Surgical History:   Procedure Laterality Date    BACK SURGERY       L5-S1 fusion with cage    COLONOSCOPY N/A 11/21/2020    COLONOSCOPY POLYPECTOMY SNARE/COLD BIOPSY, HOT BIOPSY SNARE AND PHOTOS performed by Evelyn Lim MD at [address] [place] [address]         Family History:      Problem Relation Age of Onset    Heart Attack Maternal Grandmother     Diabetes Father     Stroke Father        Social History:   Social History     Tobacco Use    Smoking status: Current Every Day Smoker     Packs/day: 1.00     Years: 35.00     Pack years: 35.00     Types: Cigarettes    Smokeless tobacco: Never Used   Vaping Use    Vaping Use: Never used   Substance Use Topics    Alcohol use: No    Drug use:  No Current Med List:  Current Outpatient Medications   Medication Sig Dispense Refill    atorvastatin (LIPITOR) 20 MG tablet Take 1 tablet by mouth daily 30 tablet 3    diclofenac sodium (VOLTAREN) 1 % GEL Apply 2 g topically 2 times daily 200 g 0    famotidine (PEPCID) 20 MG tablet Take 1 tablet by mouth 2 times daily 60 tablet 0    lisinopril (PRINIVIL;ZESTRIL) 10 MG tablet TAKE 1 TABLET BY MOUTH ONE TIME A DAY  30 tablet 0    SM ASPIRIN ADULT LOW STRENGTH 81 MG EC tablet TAKE 1 TABLET BY MOUTH ONE TIME A DAY  90 tablet 0    ammonium lactate (LAC-HYDRIN) 12 % lotion Apply topically daily. 225 g 2    Risedronate Sodium 150 MG TABS Take 150 mg by mouth every 30 days 12 tablet 3    loratadine (CLARITIN) 10 MG capsule Take 1 capsule by mouth daily 60 capsule 3    MYRBETRIQ 50 MG TB24 Take 50 mg by mouth daily 90 tablet 3    ibuprofen (ADVIL;MOTRIN) 800 MG tablet Take 1 tablet by mouth every 6 hours as needed for Pain 20 tablet 0    sodium chloride (OCEAN) 0.65 % nasal spray 1 spray by Nasal route as needed for Congestion 1 Bottle 3    acetaminophen (TYLENOL) 325 MG tablet Take 325 mg by mouth every 6 hours as needed       No current facility-administered medications for this visit. Allergies   Allergen Reactions    Influenza Vaccines      Body aches skin pain     Penicillins        SOCIAL:      This patient is alone for the evaluation today.       [] HIV Risk Factors (i.e.) intravenous drug abuser; at risk sexual behavior; received blood products    [] TB Risk Factors (i.e.) Medically underserved, institutional care, foreign born, endemic area; exposure to active case    [] Hepatitis B&C Risk Factors (i.e.) Received blood transfusion prior to 1992; recreational drug use; high risk sexual behaviors; tattoos or body piercings; contact with blood or needle sticks in the workplace    Comprehension    Ability to grasp concepts and respond to questions:   [x] High   [] Medium   [] Low    Motivation [x] Asks Questions; eager to learn   [] Needs education   [] Extreme anxiety    [] uncooperative   [] Denies need for education    English Speaking Ability    [x] Speaks English well   [] Reads [place] well   [] Understands spoken [address]    [] Understands written English   [] No need for interpretive support      [] Might benefit from interpretive support   []  required for all services     REVIEW OF SYSTEMS: (Negative unless marked otherwise)       Do you or have you had any of the following?   Cardiovascular YES NO Respiratory YES NO   High Blood Pressure   [x]   [] COPD   []   []   Heart Attack   []   [] TB/Positive skin Test   []   []   Congestive Heart Failure   []   [] Obstructive Sleep Apnea   []   []   Coronary Artery Disease   []   [] Asthma   []   [x]   Circulation Problems   []   []      Activity Intolerance   []   [] Gastrointestinal YES NO   Peripheral Vascular Disease   []   [] Gastric Problems   []   []        Colorectal problems   []   []   Hematological YES NO Ulcer disease   []   []   Bleeding Tendencies   []   [] Liver disease   []   []   Blood Transfusion last 30d   []   [] Gallstones   [x]   []   Anemia   [x]   [] Refulx or Heartburn   []   []   Blood Clots   []   []      High Cholesterol   [x]   [] Muscoloskeletal YES NO   High Triglycerides   []   [] Joint Limitations   []   []      Muscle Weakness   []   []   Eyes, Ears, Nose, Throat YES NO Multiple Sclerosis   []   []   Cataracts   [x]   [] Arthritis   [x]   []   Glasses   [x]   []      Blurred Vision   [x]   [] Cancer   []   []   Hearing Aids   []   [] Type:     Ringing in Ears   []   []      Difficulty Swallowing   []   [] Encodrine YES NO      Diabetes   []   []   Neurological YES NO Thyroid   []   []   Stroke   []   []      Seizure   []   [] Psychiatric Disorder YES NO   Dizziness/Blackouts/Fainting   []   [] Depression   []   []   Memory Impairement   []   [] Bipolar   []   []   Parkinson's   []   [] Anxiety disorder   [] []           Genitourinary/Gyn YES NO Skin Intact   [x]   []   Urinary Infection   []   [] Rash  x   Stones   []   []      Kidney Disease   []   []      Incontinent   []   []      Irregular menstrual cycles   []   []      Possibly Pregnant? []     []      Date of LMP:               PRESENT ILLNESS:     Weight Parameters  Weight 146 lb (66.2 kg)   Height 5' 2\" (1.575 m)   BMI Body mass index is 26.7 kg/m². IBW     EBW               IMMUNIZATION STATUS  Immunization History   Administered Date(s) Administered    Influenza, Quadv, IM, (6 mo and older Fluzone, Flulaval, Fluarix and 3 yrs and older Afluria) 01/03/2017    Pneumococcal Conjugate 13-valent (Sergio Gathers) 01/03/2017    Pneumococcal Polysaccharide ([id number]) 04/04/2018    Tdap (Boostrix, Adacel) 04/04/2018     VTE SCREEN    [] Family hx DVT/PE  /   [] Personal hx of DVT/PE    [x] Denies any family or personal hx of DVT/PE    Physician Review    [x] Past medical, family, & social history reviewed and discussed with patient. Review of surgery and post-surgical changes (by surgeon for surgical patients only)    [x] Lifelong diet expectations reviewed with patient      PHYSICAL EXAMINATION:      BP (!) 145/70 (Site: Right Upper Arm, Position: Sitting, Cuff Size: Medium Adult)   Pulse 70   Ht 5' 2\" (1.575 m)   Wt 146 lb (66.2 kg)   BMI 26.70 kg/m²     Constitutional:  Vital signs are normal. The patient appears well-developed   HEENT:      Head: Normocephalic. Atraumatic     Eyes: pupils are equal and reactive. No scleral icterus is present. Neck: No mass and no thyromegaly present. Cardiovascular: Normal rate, regular rhythm, S1 normal and S2 normal.  Bilateral pulses present. Pulmonary/Chest: Effort normal and breath sounds normal. No retractions. Abdominal: Soft. Normal appearance. There is no organomegaly. Right Upper Quadrant tenderness. There is no rigidity, no rebound, no guarding and no Cardona's sign.    Musculoskeletal:      Right lower leg: Normal. No tenderness and no edema. Left lower leg: Normal. No tenderness and no edema. Lymphadenopathy:     No cervical adenopathy, No Exrtemity Adenopathy. Neurological: The patient is alert and oriented. Moving all four extremities equally, sensation grossly intact bilateral.  Skin: Skin is warm, dry and intact. Psychiatric: The patient has a normal mood and affect. Speech is normal and behavior is normal. Judgment and thought content normal. Cognition and memory are normal.     RECOMMENDATIONS:     We spent a great deal of time discussing the risks and benefits of Robotic/Laparoscopic Cholecystectomy, possible Oen, including but not limited to injury to intra-abdominal organs, bile duct injury or stricture, the need for re-operative therapy, prolonged hospitalization,  mechanical ventilation, and death. We discussed the possibility of bleeding, the need for blood transfusions, blood clots, hospital-acquired and intra-abdominal infection, iliary stricture, and worsening diarrhea. We discussed the need for post-operative visit compliance, behavior modifications and diet changes    PLAN:       Diagnosis Orders   1. Chronic cholecystitis with calculus              Ultrasound reviewed with patient, Polyp vs stone  Options discussed. Conservative management vs cholecystectomy. She does appear to have symptoms consistent with biliary colic.   She is interested in cholecystectomy  Risks explained as above  Will need clearance for surgery  PAT testing and labs  All questions answered  Decision for surgery      Electronically signed by Viv Seth DO on 6/23/2021 at 9:50 PM

## 2021-07-12 RX ORDER — SODIUM CHLORIDE, SODIUM LACTATE, POTASSIUM CHLORIDE, CALCIUM CHLORIDE 600; 310; 30; 20 MG/100ML; MG/100ML; MG/100ML; MG/100ML
1000 INJECTION, SOLUTION INTRAVENOUS CONTINUOUS
Status: CANCELLED | OUTPATIENT
Start: 2021-07-12

## 2021-07-14 ENCOUNTER — HOSPITAL ENCOUNTER (OUTPATIENT)
Dept: PREADMISSION TESTING | Age: 71
Discharge: HOME | End: 2021-07-18
Payer: MEDICARE

## 2021-07-14 VITALS
DIASTOLIC BLOOD PRESSURE: 82 MMHG | HEIGHT: 62 IN | WEIGHT: 145 LBS | OXYGEN SATURATION: 97 % | SYSTOLIC BLOOD PRESSURE: 177 MMHG | BODY MASS INDEX: 26.68 KG/M2 | RESPIRATION RATE: 18 BRPM | HEART RATE: 60 BPM | TEMPERATURE: 97.4 F

## 2021-07-14 LAB
ANION GAP SERPL CALCULATED.3IONS-SCNC: 8 MMOL/L (ref 9–17)
BUN BLDV-MCNC: 9 MG/DL (ref 8–23)
BUN/CREAT BLD: ABNORMAL (ref 9–20)
CALCIUM SERPL-MCNC: 8.3 MG/DL (ref 8.6–10.4)
CHLORIDE BLD-SCNC: 106 MMOL/L (ref 98–107)
CO2: 27 MMOL/L (ref 20–31)
CREAT SERPL-MCNC: 0.51 MG/DL (ref 0.5–0.9)
GFR AFRICAN AMERICAN: >60 ML/MIN
GFR NON-AFRICAN AMERICAN: >60 ML/MIN
GFR SERPL CREATININE-BSD FRML MDRD: ABNORMAL ML/MIN/{1.73_M2}
GFR SERPL CREATININE-BSD FRML MDRD: ABNORMAL ML/MIN/{1.73_M2}
GLUCOSE BLD-MCNC: 105 MG/DL (ref 70–99)
HCT VFR BLD CALC: 40.1 % (ref 36.3–47.1)
HEMOGLOBIN: 13.3 G/DL (ref 11.9–15.1)
INR BLD: 1
MCH RBC QN AUTO: 31.6 PG (ref 25.2–33.5)
MCHC RBC AUTO-ENTMCNC: 33.2 G/DL (ref 28.4–34.8)
MCV RBC AUTO: 95.2 FL (ref 82.6–102.9)
NRBC AUTOMATED: 0 PER 100 WBC
PDW BLD-RTO: 12.5 % (ref 11.8–14.4)
PLATELET # BLD: 209 K/UL (ref 138–453)
PMV BLD AUTO: 11.1 FL (ref 8.1–13.5)
POTASSIUM SERPL-SCNC: 4.1 MMOL/L (ref 3.7–5.3)
PROTHROMBIN TIME: 11 SEC (ref 9.1–12.3)
RBC # BLD: 4.21 M/UL (ref 3.95–5.11)
SODIUM BLD-SCNC: 141 MMOL/L (ref 135–144)
WBC # BLD: 5.8 K/UL (ref 3.5–11.3)

## 2021-07-14 PROCEDURE — 85027 COMPLETE CBC AUTOMATED: CPT

## 2021-07-14 PROCEDURE — 93005 ELECTROCARDIOGRAM TRACING: CPT | Performed by: ANESTHESIOLOGY

## 2021-07-14 PROCEDURE — 85610 PROTHROMBIN TIME: CPT

## 2021-07-14 PROCEDURE — 36415 COLL VENOUS BLD VENIPUNCTURE: CPT

## 2021-07-14 PROCEDURE — 80048 BASIC METABOLIC PNL TOTAL CA: CPT

## 2021-07-14 RX ORDER — HEPARIN SODIUM 5000 [USP'U]/ML
5000 INJECTION, SOLUTION INTRAVENOUS; SUBCUTANEOUS ONCE
Status: CANCELLED | OUTPATIENT
Start: 2021-07-14 | End: 2021-07-14

## 2021-07-14 ASSESSMENT — PAIN DESCRIPTION - PROGRESSION: CLINICAL_PROGRESSION: GRADUALLY WORSENING

## 2021-07-14 ASSESSMENT — PAIN DESCRIPTION - LOCATION: LOCATION: ABDOMEN

## 2021-07-14 ASSESSMENT — PAIN SCALES - GENERAL: PAINLEVEL_OUTOF10: 6

## 2021-07-14 ASSESSMENT — PAIN DESCRIPTION - ORIENTATION: ORIENTATION: RIGHT

## 2021-07-14 ASSESSMENT — PAIN DESCRIPTION - FREQUENCY: FREQUENCY: INTERMITTENT

## 2021-07-14 ASSESSMENT — PAIN DESCRIPTION - ONSET: ONSET: ON-GOING

## 2021-07-14 ASSESSMENT — PAIN DESCRIPTION - PAIN TYPE: TYPE: ACUTE PAIN;CHRONIC PAIN

## 2021-07-14 ASSESSMENT — PAIN DESCRIPTION - DESCRIPTORS: DESCRIPTORS: STABBING

## 2021-07-14 NOTE — H&P
History and Physical    Pt Name: Abraham Hou  MRN: [id number]  YOB: 1950  Date of evaluation: 7/14/2021    SUBJECTIVE:   History of Chief Complaint:    Patient presents for PAT appointment. She complains of gallbladder disease. She had been experiencing RUQ abdominal pain, which has now spread to the LUQ at times. She has nausea without vomiting, symptoms worsening. Initially she had a trigger of spicy/fatty foods, but now says that she has symptoms with other foods as well. She had abnormal GBUS. She has been scheduled for cholecystectomy. Past Medical History    has a past medical history of Arthritis, Gallstones, Hearing loss, Hx of bronchitis, Hyperlipidemia, Hypertension, Seasonal allergies, Spondylolisthesis at L5-S1 level, and Wellness examination. Past Surgical History   has a past surgical history that includes lumbar fusion; Hysterectomy; [place] tooth extraction; Colonoscopy (N/A, 11/21/2020); and Nasal sinus surgery. Medications  Prior to Admission medications    Medication Sig Start Date End Date Taking? Authorizing Provider   acetaminophen (TYLENOL) 325 MG tablet Take 325 mg by mouth every 6 hours as needed   Yes Historical Provider, MD   atorvastatin (LIPITOR) 20 MG tablet Take 1 tablet by mouth daily 5/17/21  Yes Ra Quintana MD   famotidine (PEPCID) 20 MG tablet Take 1 tablet by mouth 2 times daily 5/6/21  Yes Ra Quintana MD   lisinopril (PRINIVIL;ZESTRIL) 10 MG tablet TAKE 1 TABLET BY MOUTH ONE TIME A DAY  5/5/21  Yes Ra Quintana MD    ASPIRIN ADULT LOW STRENGTH 81 MG EC tablet TAKE 1 TABLET BY MOUTH ONE TIME A DAY  5/5/21  Yes Ra Quintana MD   ammonium lactate (LAC-HYDRIN) 12 % lotion Apply topically daily.  2/25/21  Yes Amy Dodson MD   Risedronate Sodium 150 MG TABS Take 150 mg by mouth every 30 days 10/8/20  Yes Ra Quintana MD   loratadine (CLARITIN) 10 MG capsule Take 1 capsule by mouth daily 7/6/20  Yes Ra Quintana MD   MYRBETRIQ 50 MG TB24 Take 50 mg by mouth daily 20  Yes Arthur Coy MD   ibuprofen (ADVIL;MOTRIN) 800 MG tablet Take 1 tablet by mouth every 6 hours as needed for Pain 17  Yes Dave Steel PA-C   sodium chloride (OCEAN) 0.65 % nasal spray 1 spray by Nasal route as needed for Congestion 16  Yes Arthur Coy MD   diclofenac sodium (VOLTAREN) 1 % GEL Apply 2 g topically 2 times daily 21   Arthur Coy MD     Allergies  is allergic to influenza vaccines, bee venom, other, and penicillins. Family History  family history includes Dementia in her mother; Diabetes in her father; Heart Attack in her maternal grandmother; Stroke in her father. Social History   reports that she has been smoking cigarettes. She has a 35.00 pack-year smoking history. She has never used smokeless tobacco.   reports no history of alcohol use. reports no history of drug use. Marital Status   Occupation none    OBJECTIVE:   VITALS:  height is 5' 2\" (1.575 m) and weight is 145 lb (65.8 kg). Her temporal temperature is 97.4 °F (36.3 °C). Her blood pressure is 177/82 (abnormal) and her pulse is 60. Her respiration is 18 and oxygen saturation is 97%. CONSTITUTIONAL:alert & cooperative, no acute distress. Pleasant and talkative. SKIN:  Warm and dry, no rashes on exposed areas of skin. HEAD:  Normocephalic, atraumatic. EYES: EOMs intact. EARS:  Hearing grossly WNL. NOSE:  Nares patent. No rhinorrhea   MOUTH/THROAT:  benign  NECK:supple, no lymphadenopathy  LUNGS: Clear to auscultation bilaterally, no wheezes. CARDIOVASCULAR: Heart sounds are normal.  Regular rate and rhythm without murmur. ABDOMEN: soft, non tender, non distended. EXTREMITIES: no edema bilateral lower extremities.     Testing:   EK21  Labs pending: drawn 2021     IMPRESSIONS:   1. cholecystitis  2.  has a past medical history of Arthritis, Gallstones, Hearing loss, bronchitis, Hyperlipidemia, Hypertension, Seasonal allergies, Spondylolisthesis at L5-S1 level, and Wellness examination (07/14/2021).    PLANS:   1. cholecystectomy    JOSUE Galeano PA-C  Electronically signed 7/14/2021 at 10:34 AM

## 2021-07-14 NOTE — H&P (VIEW-ONLY)
History and Physical    Pt Name: Lucita Davis  MRN: [id number]  YOB: 1950  Date of evaluation: 7/14/2021    SUBJECTIVE:   History of Chief Complaint:    Patient presents for PAT appointment. She complains of gallbladder disease. She had been experiencing RUQ abdominal pain, which has now spread to the LUQ at times. She has nausea without vomiting, symptoms worsening. Initially she had a trigger of spicy/fatty foods, but now says that she has symptoms with other foods as well. She had abnormal GBUS. She has been scheduled for cholecystectomy. Past Medical History    has a past medical history of Arthritis, Gallstones, Hearing loss, Hx of bronchitis, Hyperlipidemia, Hypertension, Seasonal allergies, Spondylolisthesis at L5-S1 level, and Wellness examination. Past Surgical History   has a past surgical history that includes lumbar fusion; Hysterectomy; [place] tooth extraction; Colonoscopy (N/A, 11/21/2020); and Nasal sinus surgery. Medications  Prior to Admission medications    Medication Sig Start Date End Date Taking? Authorizing Provider   acetaminophen (TYLENOL) 325 MG tablet Take 325 mg by mouth every 6 hours as needed   Yes Historical Provider, MD   atorvastatin (LIPITOR) 20 MG tablet Take 1 tablet by mouth daily 5/17/21  Yes Eliza Connell MD   famotidine (PEPCID) 20 MG tablet Take 1 tablet by mouth 2 times daily 5/6/21  Yes Eliza Connell MD   lisinopril (PRINIVIL;ZESTRIL) 10 MG tablet TAKE 1 TABLET BY MOUTH ONE TIME A DAY  5/5/21  Yes Eliza Connell MD    ASPIRIN ADULT LOW STRENGTH 81 MG EC tablet TAKE 1 TABLET BY MOUTH ONE TIME A DAY  5/5/21  Yes Eliza Connell MD   ammonium lactate (LAC-HYDRIN) 12 % lotion Apply topically daily.  2/25/21  Yes Lynn Miller MD   Risedronate Sodium 150 MG TABS Take 150 mg by mouth every 30 days 10/8/20  Yes Eliza Connell MD   loratadine (CLARITIN) 10 MG capsule Take 1 capsule by mouth daily 7/6/20  Yes Eliza Connell MD   MYRBETRIQ 50 MG TB24 Take 50 mg by mouth daily 20  Yes Bhavik Paula MD   ibuprofen (ADVIL;MOTRIN) 800 MG tablet Take 1 tablet by mouth every 6 hours as needed for Pain 17  Yes Fam Fonseca PA-C   sodium chloride (OCEAN) 0.65 % nasal spray 1 spray by Nasal route as needed for Congestion 16  Yes Bhavik Paula MD   diclofenac sodium (VOLTAREN) 1 % GEL Apply 2 g topically 2 times daily 21   Bhavik Paula MD     Allergies  is allergic to influenza vaccines, bee venom, other, and penicillins. Family History  family history includes Dementia in her mother; Diabetes in her father; Heart Attack in her maternal grandmother; Stroke in her father. Social History   reports that she has been smoking cigarettes. She has a 35.00 pack-year smoking history. She has never used smokeless tobacco.   reports no history of alcohol use. reports no history of drug use. Marital Status   Occupation none    OBJECTIVE:   VITALS:  height is 5' 2\" (1.575 m) and weight is 145 lb (65.8 kg). Her temporal temperature is 97.4 °F (36.3 °C). Her blood pressure is 177/82 (abnormal) and her pulse is 60. Her respiration is 18 and oxygen saturation is 97%. CONSTITUTIONAL:alert & cooperative, no acute distress. Pleasant and talkative. SKIN:  Warm and dry, no rashes on exposed areas of skin. HEAD:  Normocephalic, atraumatic. EYES: EOMs intact. EARS:  Hearing grossly WNL. NOSE:  Nares patent. No rhinorrhea   MOUTH/THROAT:  benign  NECK:supple, no lymphadenopathy  LUNGS: Clear to auscultation bilaterally, no wheezes. CARDIOVASCULAR: Heart sounds are normal.  Regular rate and rhythm without murmur. ABDOMEN: soft, non tender, non distended. EXTREMITIES: no edema bilateral lower extremities.     Testing:   EK21  Labs pending: drawn 2021     IMPRESSIONS:   cholecystitis   has a past medical history of Arthritis, Gallstones, Hearing loss, bronchitis, Hyperlipidemia, Hypertension, Seasonal allergies, Spondylolisthesis at L5-S1 level, and Wellness examination (07/14/2021).    PLANS:   JOSUE Reynoso-DOTTIE  Electronically signed 7/14/2021 at 10:34 AM

## 2021-07-14 NOTE — PROGRESS NOTES
Anesthesia Focused Assessment    Has patient ever tested positive for COVID? No    STOP-BANG Sleep Apnea Questionnaire    SNORE loudly (heard through closed doors)? No  TIRED, fatigued, sleepy during daytime? No  OBSERVED stopping breathing during sleep? No  High blood PRESSURE being treated? Yes    BMI over 35? No  AGE over 48? Yes  NECK circumference over 16\"? No  GENDER (male)? No             Total 2  High risk 5-8  Intermediate risk 3-4  Low risk 0-2    Obstructive Sleep Apnea: denies  If YES, machine used: no     Type 1 DM:   no  T2DM:  no    Coronary Artery Disease:  no  Hypertension:  yes    Active smoker:  1 ppd for 35 years. Drinks Alcohol:  no    Dentition: benign    Defib / AICD / Pacemaker: no      Renal Failure/dialysis:  no    Patient was evaluated in PAT & anesthesia guidelines were applied. NPO guidelines, medication instructions and scheduled arrival time were reviewed with patient.     Hx of anesthesia complications:  no  Family hx of anesthesia complications:  no                                                                                                                     Anesthesia contacted:   no  Medical or cardiac clearance ordered: no    Glenn Gates PA-C  7/14/21  9:50 AM

## 2021-07-15 LAB
EKG ATRIAL RATE: 54 BPM
EKG P AXIS: 25 DEGREES
EKG P-R INTERVAL: 124 MS
EKG Q-T INTERVAL: 440 MS
EKG QRS DURATION: 76 MS
EKG QTC CALCULATION (BAZETT): 417 MS
EKG R AXIS: 20 DEGREES
EKG T AXIS: 29 DEGREES
EKG VENTRICULAR RATE: 54 BPM

## 2021-07-17 ENCOUNTER — HOSPITAL ENCOUNTER (OUTPATIENT)
Dept: LAB | Age: 71
Setting detail: SPECIMEN
Discharge: HOME | End: 2021-07-17
Payer: MEDICARE

## 2021-07-17 DIAGNOSIS — Z20.822 COVID-19 RULED OUT BY LABORATORY TESTING: Primary | ICD-10-CM

## 2021-07-17 PROCEDURE — U0005 INFEC AGEN DETEC AMPLI PROBE: HCPCS

## 2021-07-17 PROCEDURE — U0003 INFECTIOUS AGENT DETECTION BY NUCLEIC ACID (DNA OR RNA); SEVERE ACUTE RESPIRATORY SYNDROME CORONAVIRUS 2 (SARS-COV-2) (CORONAVIRUS DISEASE [COVID-19]), AMPLIFIED PROBE TECHNIQUE, MAKING USE OF HIGH THROUGHPUT TECHNOLOGIES AS DESCRIBED BY CMS-2020-01-R: HCPCS

## 2021-07-18 LAB
SARS-COV-2: NORMAL
SARS-COV-2: NOT DETECTED
SOURCE: NORMAL

## 2021-07-19 ENCOUNTER — TELEPHONE (OUTPATIENT)
Dept: BARIATRICS/WEIGHT MGMT | Age: 71
End: 2021-07-19

## 2021-07-19 NOTE — TELEPHONE ENCOUNTER
Called patient and changed surgery time from 12:30 pm to 11:30 am and to arrive at surgery center by 9:30 am.  Patient verbalized understanding.

## 2021-07-20 ENCOUNTER — ANESTHESIA EVENT (OUTPATIENT)
Dept: OPERATING ROOM | Age: 71
End: 2021-07-20
Payer: MEDICARE

## 2021-07-21 ENCOUNTER — ANESTHESIA (OUTPATIENT)
Dept: OPERATING ROOM | Age: 71
End: 2021-07-21
Payer: MEDICARE

## 2021-07-21 ENCOUNTER — HOSPITAL ENCOUNTER (OUTPATIENT)
Age: 71
Setting detail: OBSERVATION
Discharge: HOME | End: 2021-07-22
Attending: SURGERY | Admitting: SURGERY
Payer: MEDICARE

## 2021-07-21 VITALS — OXYGEN SATURATION: 99 % | SYSTOLIC BLOOD PRESSURE: 210 MMHG | TEMPERATURE: 96.4 F | DIASTOLIC BLOOD PRESSURE: 93 MMHG

## 2021-07-21 DIAGNOSIS — Z90.49 S/P LAPAROSCOPIC CHOLECYSTECTOMY: Primary | ICD-10-CM

## 2021-07-21 DIAGNOSIS — G89.18 ACUTE POSTOPERATIVE PAIN: ICD-10-CM

## 2021-07-21 PROCEDURE — 3700000000 HC ANESTHESIA ATTENDED CARE: Performed by: SURGERY

## 2021-07-21 PROCEDURE — 3600000019 HC SURGERY ROBOT ADDTL 15MIN: Performed by: SURGERY

## 2021-07-21 PROCEDURE — 88304 TISSUE EXAM BY PATHOLOGIST: CPT

## 2021-07-21 PROCEDURE — 47562 LAPAROSCOPIC CHOLECYSTECTOMY: CPT | Performed by: SURGERY

## 2021-07-21 PROCEDURE — 3700000001 HC ADD 15 MINUTES (ANESTHESIA): Performed by: SURGERY

## 2021-07-21 PROCEDURE — C1760 CLOSURE DEV, VASC: HCPCS | Performed by: SURGERY

## 2021-07-21 PROCEDURE — 2580000003 HC RX 258: Performed by: SURGERY

## 2021-07-21 PROCEDURE — 7100000000 HC PACU RECOVERY - FIRST 15 MIN: Performed by: SURGERY

## 2021-07-21 PROCEDURE — S2900 ROBOTIC SURGICAL SYSTEM: HCPCS | Performed by: SURGERY

## 2021-07-21 PROCEDURE — 7100000001 HC PACU RECOVERY - ADDTL 15 MIN: Performed by: SURGERY

## 2021-07-21 PROCEDURE — 6360000002 HC RX W HCPCS: Performed by: SURGERY

## 2021-07-21 PROCEDURE — 2500000003 HC RX 250 WO HCPCS: Performed by: NURSE ANESTHETIST, CERTIFIED REGISTERED

## 2021-07-21 PROCEDURE — 2500000003 HC RX 250 WO HCPCS: Performed by: SURGERY

## 2021-07-21 PROCEDURE — 6370000000 HC RX 637 (ALT 250 FOR IP): Performed by: STUDENT IN AN ORGANIZED HEALTH CARE EDUCATION/TRAINING PROGRAM

## 2021-07-21 PROCEDURE — 2709999900 HC NON-CHARGEABLE SUPPLY: Performed by: SURGERY

## 2021-07-21 PROCEDURE — 2580000003 HC RX 258: Performed by: STUDENT IN AN ORGANIZED HEALTH CARE EDUCATION/TRAINING PROGRAM

## 2021-07-21 PROCEDURE — 3600000009 HC SURGERY ROBOT BASE: Performed by: SURGERY

## 2021-07-21 PROCEDURE — 6360000002 HC RX W HCPCS: Performed by: NURSE ANESTHETIST, CERTIFIED REGISTERED

## 2021-07-21 PROCEDURE — 2580000003 HC RX 258: Performed by: ANESTHESIOLOGY

## 2021-07-21 PROCEDURE — G0378 HOSPITAL OBSERVATION PER HR: HCPCS

## 2021-07-21 PROCEDURE — 6360000002 HC RX W HCPCS: Performed by: ANESTHESIOLOGY

## 2021-07-21 RX ORDER — PHENYLEPHRINE HCL IN 0.9% NACL 1 MG/10 ML
SYRINGE (ML) INTRAVENOUS PRN
Status: DISCONTINUED | OUTPATIENT
Start: 2021-07-21 | End: 2021-07-21 | Stop reason: SDUPTHER

## 2021-07-21 RX ORDER — MAGNESIUM HYDROXIDE 1200 MG/15ML
LIQUID ORAL CONTINUOUS PRN
Status: COMPLETED | OUTPATIENT
Start: 2021-07-21 | End: 2021-07-21

## 2021-07-21 RX ORDER — ONDANSETRON 2 MG/ML
INJECTION INTRAMUSCULAR; INTRAVENOUS PRN
Status: DISCONTINUED | OUTPATIENT
Start: 2021-07-21 | End: 2021-07-21 | Stop reason: SDUPTHER

## 2021-07-21 RX ORDER — FENTANYL CITRATE 50 UG/ML
25 INJECTION, SOLUTION INTRAMUSCULAR; INTRAVENOUS EVERY 5 MIN PRN
Status: DISCONTINUED | OUTPATIENT
Start: 2021-07-21 | End: 2021-07-21 | Stop reason: HOSPADM

## 2021-07-21 RX ORDER — SODIUM CHLORIDE, SODIUM LACTATE, POTASSIUM CHLORIDE, CALCIUM CHLORIDE 600; 310; 30; 20 MG/100ML; MG/100ML; MG/100ML; MG/100ML
INJECTION, SOLUTION INTRAVENOUS CONTINUOUS
Status: DISCONTINUED | OUTPATIENT
Start: 2021-07-21 | End: 2021-07-21

## 2021-07-21 RX ORDER — GLYCOPYRROLATE 1 MG/5 ML
SYRINGE (ML) INTRAVENOUS PRN
Status: DISCONTINUED | OUTPATIENT
Start: 2021-07-21 | End: 2021-07-21 | Stop reason: SDUPTHER

## 2021-07-21 RX ORDER — ROCURONIUM BROMIDE 10 MG/ML
INJECTION, SOLUTION INTRAVENOUS PRN
Status: DISCONTINUED | OUTPATIENT
Start: 2021-07-21 | End: 2021-07-21 | Stop reason: SDUPTHER

## 2021-07-21 RX ORDER — OXYCODONE HYDROCHLORIDE AND ACETAMINOPHEN 5; 325 MG/1; MG/1
1 TABLET ORAL EVERY 4 HOURS PRN
Status: DISCONTINUED | OUTPATIENT
Start: 2021-07-21 | End: 2021-07-22

## 2021-07-21 RX ORDER — DIPHENHYDRAMINE HYDROCHLORIDE 50 MG/ML
12.5 INJECTION INTRAMUSCULAR; INTRAVENOUS
Status: COMPLETED | OUTPATIENT
Start: 2021-07-21 | End: 2021-07-21

## 2021-07-21 RX ORDER — LABETALOL HYDROCHLORIDE 5 MG/ML
5 INJECTION, SOLUTION INTRAVENOUS EVERY 10 MIN PRN
Status: DISCONTINUED | OUTPATIENT
Start: 2021-07-21 | End: 2021-07-21 | Stop reason: HOSPADM

## 2021-07-21 RX ORDER — SODIUM CHLORIDE 9 MG/ML
25 INJECTION, SOLUTION INTRAVENOUS PRN
Status: DISCONTINUED | OUTPATIENT
Start: 2021-07-21 | End: 2021-07-22 | Stop reason: HOSPADM

## 2021-07-21 RX ORDER — FAMOTIDINE 20 MG/1
20 TABLET, FILM COATED ORAL 2 TIMES DAILY
Status: DISCONTINUED | OUTPATIENT
Start: 2021-07-21 | End: 2021-07-22 | Stop reason: HOSPADM

## 2021-07-21 RX ORDER — FENTANYL CITRATE 50 UG/ML
50 INJECTION, SOLUTION INTRAMUSCULAR; INTRAVENOUS ONCE
Status: DISCONTINUED | OUTPATIENT
Start: 2021-07-21 | End: 2021-07-21

## 2021-07-21 RX ORDER — SODIUM CHLORIDE 0.9 % (FLUSH) 0.9 %
5-40 SYRINGE (ML) INJECTION PRN
Status: DISCONTINUED | OUTPATIENT
Start: 2021-07-21 | End: 2021-07-21 | Stop reason: HOSPADM

## 2021-07-21 RX ORDER — LABETALOL HYDROCHLORIDE 5 MG/ML
INJECTION, SOLUTION INTRAVENOUS PRN
Status: DISCONTINUED | OUTPATIENT
Start: 2021-07-21 | End: 2021-07-21 | Stop reason: SDUPTHER

## 2021-07-21 RX ORDER — NEOSTIGMINE METHYLSULFATE 5 MG/5 ML
SYRINGE (ML) INTRAVENOUS PRN
Status: DISCONTINUED | OUTPATIENT
Start: 2021-07-21 | End: 2021-07-21 | Stop reason: SDUPTHER

## 2021-07-21 RX ORDER — ONDANSETRON 4 MG/1
4 TABLET, FILM COATED ORAL EVERY 8 HOURS PRN
Qty: 30 TABLET | Refills: 0 | Status: SHIPPED | OUTPATIENT
Start: 2021-07-21 | End: 2021-09-09 | Stop reason: ALTCHOICE

## 2021-07-21 RX ORDER — SODIUM CHLORIDE, SODIUM LACTATE, POTASSIUM CHLORIDE, CALCIUM CHLORIDE 600; 310; 30; 20 MG/100ML; MG/100ML; MG/100ML; MG/100ML
1000 INJECTION, SOLUTION INTRAVENOUS CONTINUOUS
Status: DISCONTINUED | OUTPATIENT
Start: 2021-07-21 | End: 2021-07-21

## 2021-07-21 RX ORDER — MIDAZOLAM HYDROCHLORIDE 2 MG/2ML
1 INJECTION, SOLUTION INTRAMUSCULAR; INTRAVENOUS ONCE
Status: DISCONTINUED | OUTPATIENT
Start: 2021-07-21 | End: 2021-07-21

## 2021-07-21 RX ORDER — SODIUM CHLORIDE 0.9 % (FLUSH) 0.9 %
5-40 SYRINGE (ML) INJECTION EVERY 12 HOURS SCHEDULED
Status: DISCONTINUED | OUTPATIENT
Start: 2021-07-21 | End: 2021-07-22 | Stop reason: HOSPADM

## 2021-07-21 RX ORDER — MIDAZOLAM HYDROCHLORIDE 2 MG/2ML
1 INJECTION, SOLUTION INTRAMUSCULAR; INTRAVENOUS EVERY 10 MIN PRN
Status: DISCONTINUED | OUTPATIENT
Start: 2021-07-21 | End: 2021-07-21 | Stop reason: HOSPADM

## 2021-07-21 RX ORDER — HEPARIN SODIUM 5000 [USP'U]/ML
5000 INJECTION, SOLUTION INTRAVENOUS; SUBCUTANEOUS ONCE
Status: COMPLETED | OUTPATIENT
Start: 2021-07-21 | End: 2021-07-21

## 2021-07-21 RX ORDER — LIDOCAINE HYDROCHLORIDE 10 MG/ML
1 INJECTION, SOLUTION EPIDURAL; INFILTRATION; INTRACAUDAL; PERINEURAL
Status: DISCONTINUED | OUTPATIENT
Start: 2021-07-21 | End: 2021-07-21 | Stop reason: HOSPADM

## 2021-07-21 RX ORDER — OXYCODONE HYDROCHLORIDE AND ACETAMINOPHEN 5; 325 MG/1; MG/1
1 TABLET ORAL EVERY 6 HOURS PRN
Qty: 28 TABLET | Refills: 0 | Status: SHIPPED | OUTPATIENT
Start: 2021-07-21 | End: 2021-07-22 | Stop reason: HOSPADM

## 2021-07-21 RX ORDER — PROPOFOL 10 MG/ML
INJECTION, EMULSION INTRAVENOUS PRN
Status: DISCONTINUED | OUTPATIENT
Start: 2021-07-21 | End: 2021-07-21 | Stop reason: SDUPTHER

## 2021-07-21 RX ORDER — FENTANYL CITRATE 50 UG/ML
INJECTION, SOLUTION INTRAMUSCULAR; INTRAVENOUS PRN
Status: DISCONTINUED | OUTPATIENT
Start: 2021-07-21 | End: 2021-07-21 | Stop reason: SDUPTHER

## 2021-07-21 RX ORDER — MIDAZOLAM HYDROCHLORIDE 1 MG/ML
INJECTION INTRAMUSCULAR; INTRAVENOUS PRN
Status: DISCONTINUED | OUTPATIENT
Start: 2021-07-21 | End: 2021-07-21 | Stop reason: SDUPTHER

## 2021-07-21 RX ORDER — BUPIVACAINE HYDROCHLORIDE 5 MG/ML
INJECTION, SOLUTION PERINEURAL PRN
Status: DISCONTINUED | OUTPATIENT
Start: 2021-07-21 | End: 2021-07-21 | Stop reason: HOSPADM

## 2021-07-21 RX ORDER — DEXAMETHASONE SODIUM PHOSPHATE 10 MG/ML
INJECTION INTRAMUSCULAR; INTRAVENOUS PRN
Status: DISCONTINUED | OUTPATIENT
Start: 2021-07-21 | End: 2021-07-21 | Stop reason: SDUPTHER

## 2021-07-21 RX ORDER — ATORVASTATIN CALCIUM 20 MG/1
20 TABLET, FILM COATED ORAL DAILY
Status: DISCONTINUED | OUTPATIENT
Start: 2021-07-22 | End: 2021-07-22 | Stop reason: HOSPADM

## 2021-07-21 RX ORDER — 0.9 % SODIUM CHLORIDE 0.9 %
500 INTRAVENOUS SOLUTION INTRAVENOUS
Status: DISCONTINUED | OUTPATIENT
Start: 2021-07-21 | End: 2021-07-21 | Stop reason: HOSPADM

## 2021-07-21 RX ORDER — LIDOCAINE HYDROCHLORIDE 10 MG/ML
INJECTION, SOLUTION EPIDURAL; INFILTRATION; INTRACAUDAL; PERINEURAL PRN
Status: DISCONTINUED | OUTPATIENT
Start: 2021-07-21 | End: 2021-07-21 | Stop reason: SDUPTHER

## 2021-07-21 RX ORDER — BUPIVACAINE HYDROCHLORIDE 5 MG/ML
40 INJECTION, SOLUTION EPIDURAL; INTRACAUDAL ONCE
Status: DISCONTINUED | OUTPATIENT
Start: 2021-07-21 | End: 2021-07-21 | Stop reason: HOSPADM

## 2021-07-21 RX ORDER — SODIUM CHLORIDE 9 MG/ML
25 INJECTION, SOLUTION INTRAVENOUS PRN
Status: DISCONTINUED | OUTPATIENT
Start: 2021-07-21 | End: 2021-07-21 | Stop reason: HOSPADM

## 2021-07-21 RX ORDER — ONDANSETRON 2 MG/ML
4 INJECTION INTRAMUSCULAR; INTRAVENOUS EVERY 6 HOURS PRN
Status: DISCONTINUED | OUTPATIENT
Start: 2021-07-21 | End: 2021-07-22 | Stop reason: HOSPADM

## 2021-07-21 RX ORDER — LISINOPRIL 10 MG/1
10 TABLET ORAL DAILY
Status: DISCONTINUED | OUTPATIENT
Start: 2021-07-22 | End: 2021-07-22 | Stop reason: HOSPADM

## 2021-07-21 RX ORDER — SODIUM CHLORIDE 0.9 % (FLUSH) 0.9 %
5-40 SYRINGE (ML) INJECTION PRN
Status: DISCONTINUED | OUTPATIENT
Start: 2021-07-21 | End: 2021-07-22 | Stop reason: HOSPADM

## 2021-07-21 RX ORDER — SODIUM CHLORIDE 0.9 % (FLUSH) 0.9 %
5-40 SYRINGE (ML) INJECTION EVERY 12 HOURS SCHEDULED
Status: DISCONTINUED | OUTPATIENT
Start: 2021-07-21 | End: 2021-07-21 | Stop reason: HOSPADM

## 2021-07-21 RX ORDER — INDOCYANINE GREEN AND WATER 25 MG
5 KIT INJECTION ONCE
Status: COMPLETED | OUTPATIENT
Start: 2021-07-21 | End: 2021-07-21

## 2021-07-21 RX ORDER — ONDANSETRON 4 MG/1
4 TABLET, ORALLY DISINTEGRATING ORAL EVERY 8 HOURS PRN
Status: DISCONTINUED | OUTPATIENT
Start: 2021-07-21 | End: 2021-07-22 | Stop reason: HOSPADM

## 2021-07-21 RX ADMIN — ONDANSETRON 4 MG: 2 INJECTION, SOLUTION INTRAMUSCULAR; INTRAVENOUS at 18:28

## 2021-07-21 RX ADMIN — Medication 0.6 MG: at 18:28

## 2021-07-21 RX ADMIN — ROCURONIUM BROMIDE 50 MG: 10 INJECTION INTRAVENOUS at 17:29

## 2021-07-21 RX ADMIN — OXYCODONE HYDROCHLORIDE AND ACETAMINOPHEN 1 TABLET: 5; 325 TABLET ORAL at 22:00

## 2021-07-21 RX ADMIN — Medication 3 MG: at 18:28

## 2021-07-21 RX ADMIN — FENTANYL CITRATE 50 MCG: 50 INJECTION, SOLUTION INTRAMUSCULAR; INTRAVENOUS at 18:42

## 2021-07-21 RX ADMIN — SODIUM CHLORIDE, PRESERVATIVE FREE 10 ML: 5 INJECTION INTRAVENOUS at 22:01

## 2021-07-21 RX ADMIN — FAMOTIDINE 20 MG: 20 TABLET, FILM COATED ORAL at 22:00

## 2021-07-21 RX ADMIN — LIDOCAINE HYDROCHLORIDE 50 MG: 10 INJECTION, SOLUTION EPIDURAL; INFILTRATION; INTRACAUDAL; PERINEURAL at 17:27

## 2021-07-21 RX ADMIN — Medication 5 MG: at 18:42

## 2021-07-21 RX ADMIN — MIDAZOLAM HYDROCHLORIDE 2 MG: 1 INJECTION, SOLUTION INTRAMUSCULAR; INTRAVENOUS at 17:20

## 2021-07-21 RX ADMIN — CEFAZOLIN 2000 MG: 10 INJECTION, POWDER, FOR SOLUTION INTRAVENOUS at 17:39

## 2021-07-21 RX ADMIN — HYDROMORPHONE HYDROCHLORIDE 0.5 MG: 1 INJECTION, SOLUTION INTRAMUSCULAR; INTRAVENOUS; SUBCUTANEOUS at 19:01

## 2021-07-21 RX ADMIN — DIPHENHYDRAMINE HYDROCHLORIDE 12.5 MG: 50 INJECTION INTRAMUSCULAR; INTRAVENOUS at 19:48

## 2021-07-21 RX ADMIN — HYDROMORPHONE HYDROCHLORIDE 0.5 MG: 1 INJECTION, SOLUTION INTRAMUSCULAR; INTRAVENOUS; SUBCUTANEOUS at 19:25

## 2021-07-21 RX ADMIN — Medication 100 MCG: at 18:07

## 2021-07-21 RX ADMIN — SODIUM CHLORIDE, POTASSIUM CHLORIDE, SODIUM LACTATE AND CALCIUM CHLORIDE 1000 ML: 600; 310; 30; 20 INJECTION, SOLUTION INTRAVENOUS at 10:36

## 2021-07-21 RX ADMIN — FENTANYL CITRATE 100 MCG: 50 INJECTION, SOLUTION INTRAMUSCULAR; INTRAVENOUS at 17:28

## 2021-07-21 RX ADMIN — INDOCYANINE GREEN AND WATER 5 MG: KIT at 16:21

## 2021-07-21 RX ADMIN — DEXAMETHASONE SODIUM PHOSPHATE 10 MG: 10 INJECTION INTRAMUSCULAR; INTRAVENOUS at 17:36

## 2021-07-21 RX ADMIN — PROPOFOL INJECTABLE EMULSION 150 MG: 10 INJECTION, EMULSION INTRAVENOUS at 17:28

## 2021-07-21 RX ADMIN — HYDROMORPHONE HYDROCHLORIDE 0.5 MG: 1 INJECTION, SOLUTION INTRAMUSCULAR; INTRAVENOUS; SUBCUTANEOUS at 19:13

## 2021-07-21 RX ADMIN — HEPARIN SODIUM 5000 UNITS: 5000 INJECTION INTRAVENOUS; SUBCUTANEOUS at 10:37

## 2021-07-21 RX ADMIN — SODIUM CHLORIDE, POTASSIUM CHLORIDE, SODIUM LACTATE AND CALCIUM CHLORIDE 1000 ML: 600; 310; 30; 20 INJECTION, SOLUTION INTRAVENOUS at 14:05

## 2021-07-21 RX ADMIN — HYDROMORPHONE HYDROCHLORIDE 0.5 MG: 1 INJECTION, SOLUTION INTRAMUSCULAR; INTRAVENOUS; SUBCUTANEOUS at 18:55

## 2021-07-21 RX ADMIN — Medication 5 MG: at 17:53

## 2021-07-21 RX ADMIN — FENTANYL CITRATE 25 MCG: 50 INJECTION, SOLUTION INTRAMUSCULAR; INTRAVENOUS at 19:47

## 2021-07-21 RX ADMIN — Medication 100 MCG: at 18:01

## 2021-07-21 ASSESSMENT — PULMONARY FUNCTION TESTS
PIF_VALUE: 17
PIF_VALUE: 27
PIF_VALUE: 5
PIF_VALUE: 17
PIF_VALUE: 2
PIF_VALUE: 18
PIF_VALUE: 27
PIF_VALUE: 27
PIF_VALUE: 16
PIF_VALUE: 0
PIF_VALUE: 2
PIF_VALUE: 28
PIF_VALUE: 17
PIF_VALUE: 26
PIF_VALUE: 18
PIF_VALUE: 1
PIF_VALUE: 0
PIF_VALUE: 17
PIF_VALUE: 1
PIF_VALUE: 24
PIF_VALUE: 0
PIF_VALUE: 18
PIF_VALUE: 26
PIF_VALUE: 2
PIF_VALUE: 17
PIF_VALUE: 27
PIF_VALUE: 26
PIF_VALUE: 26
PIF_VALUE: 27
PIF_VALUE: 17
PIF_VALUE: 1
PIF_VALUE: 18
PIF_VALUE: 16
PIF_VALUE: 27
PIF_VALUE: 16
PIF_VALUE: 27
PIF_VALUE: 17
PIF_VALUE: 17
PIF_VALUE: 27
PIF_VALUE: 23
PIF_VALUE: 26
PIF_VALUE: 27
PIF_VALUE: 17
PIF_VALUE: 26
PIF_VALUE: 3
PIF_VALUE: 19
PIF_VALUE: 18
PIF_VALUE: 5
PIF_VALUE: 18
PIF_VALUE: 16
PIF_VALUE: 27
PIF_VALUE: 0
PIF_VALUE: 17
PIF_VALUE: 26
PIF_VALUE: 30
PIF_VALUE: 13
PIF_VALUE: 5
PIF_VALUE: 17
PIF_VALUE: 27
PIF_VALUE: 0
PIF_VALUE: 26
PIF_VALUE: 17
PIF_VALUE: 26
PIF_VALUE: 17
PIF_VALUE: 7
PIF_VALUE: 17
PIF_VALUE: 26
PIF_VALUE: 18
PIF_VALUE: 27
PIF_VALUE: 17
PIF_VALUE: 27
PIF_VALUE: 18
PIF_VALUE: 8
PIF_VALUE: 5
PIF_VALUE: 4
PIF_VALUE: 27
PIF_VALUE: 26
PIF_VALUE: 18
PIF_VALUE: 19
PIF_VALUE: 27
PIF_VALUE: 27
PIF_VALUE: 0
PIF_VALUE: 27

## 2021-07-21 ASSESSMENT — PAIN DESCRIPTION - DESCRIPTORS
DESCRIPTORS: POUNDING;DISCOMFORT
DESCRIPTORS: ACHING;CONSTANT

## 2021-07-21 ASSESSMENT — PAIN SCALES - GENERAL
PAINLEVEL_OUTOF10: 8
PAINLEVEL_OUTOF10: 10
PAINLEVEL_OUTOF10: 8
PAINLEVEL_OUTOF10: 8
PAINLEVEL_OUTOF10: 10
PAINLEVEL_OUTOF10: 10
PAINLEVEL_OUTOF10: 8
PAINLEVEL_OUTOF10: 7
PAINLEVEL_OUTOF10: 10
PAINLEVEL_OUTOF10: 9

## 2021-07-21 ASSESSMENT — PAIN SCALES - WONG BAKER: WONGBAKER_NUMERICALRESPONSE: 0

## 2021-07-21 ASSESSMENT — PAIN DESCRIPTION - ONSET: ONSET: ON-GOING

## 2021-07-21 ASSESSMENT — PAIN DESCRIPTION - ORIENTATION: ORIENTATION: RIGHT;UPPER

## 2021-07-21 ASSESSMENT — PAIN DESCRIPTION - FREQUENCY: FREQUENCY: CONTINUOUS

## 2021-07-21 ASSESSMENT — PAIN - FUNCTIONAL ASSESSMENT
PAIN_FUNCTIONAL_ASSESSMENT: 0-10
PAIN_FUNCTIONAL_ASSESSMENT: ACTIVITIES ARE NOT PREVENTED

## 2021-07-21 ASSESSMENT — PAIN DESCRIPTION - PAIN TYPE
TYPE: ACUTE PAIN;SURGICAL PAIN
TYPE: ACUTE PAIN;SURGICAL PAIN

## 2021-07-21 ASSESSMENT — PAIN DESCRIPTION - LOCATION: LOCATION: ABDOMEN

## 2021-07-21 NOTE — OP NOTE
Operative Note      Patient: Darlene Caceres  YOB: 1950  MRN: [id number]    Date of Procedure: 7/21/2021    Pre-Op Diagnosis: CHRONIC CHOLECYSTITIS WITH CHOLELITHIASIS    Post-Op Diagnosis: Same       Procedure(s):  XI ROBOTIC LAPAROSCOPIC CHOLECYSTECTOMY,    Surgeon(s):  DO Tiffany Schroeder MD    Assistant:   * No surgical staff found *    Anesthesia: General    Estimated Blood Loss (mL): Minimal    Complications: None    Specimens:   ID Type Source Tests Collected by Time Destination   A : gallbladder Tissue Gallbladder SURGICAL PATHOLOGY Carl El DO 7/21/2021 1812        Implants:  * No implants in log *      Drains: * No LDAs found *    Findings:   HEMOSTASIS  ICG USED TO CONFIRM ANATOMY  CRITICAL VIEW OBTAINED    Detailed Description of Procedure:   ICG USED TO CONFIRM ANATOMY  GALLBLADDER FOSSA HEMOSTATIC     Detailed Description of Procedure:   Operative narrative: The risks and benefits of the procedure were explained in detail to the patient who agreed and consented with the procedure. Patient was taken to the operative suite and administered general anesthetic by the anesthetic team.       Using an optical access trocar and a 12 mm port, the peritoneal cavity was entered under direct visualization from Huang's point. Pneumoperitoneum was established at that time without complication. Next, three 8 mm ports were then placed in standard fashion for robotic assisted cholecystectomy. All ports placed under direct laparoscopic visualization. The robot docked. We surveyed our entry site into the abdomen at Huang's point. No evidence of underlying injury to the viscera noted. The Gallbladder was then elevated up and over the liver to expose the gallbladder in it's entirety. Blunt dissection as well as electrocautery was used to skeletonize the cystic duct. Appropriate lateral retraction was undertaken.   Other dissection was then undertaken to expose the cystic duct and cystic artery further. Critical view obtained. Of note there was significant fibrotic chronic inflammation around the cystic duct and gallbladder wall edema. There was edema in the gallbladder wall. Cystic duct was noted to travel up into the gallbladder and down into the common bile duct. The cystic duct and cystic artery were then clipped using robotic hemo-lock clips. The anatomy confirmed with ICG. The cystic duct and cystic artery were each clipped with 3 hemo-lock clips. The cystic duct was then cut using the laparoscopic scissors. There was some sludge in the lumen of the cystic duct. A single-lumen noted on each side. The cystic artery was also cut with the vessel sealer and appropriate hemostasis noted. The gallbladder was then dissected from the liver bed using electrocautery and proper countertraction. The liver bed was noted to be hemostatic. The laparoscopic clips were reinspected and noted be intact on the cystic duct and cystic artery. An Endo Catch bag was advanced into the abdomen and the gallbladder placed within the bag. Gallbladder was then removed from the left upper quadrant port site under direct laparoscopic visualization. Proper hemostasis again noted along the liver bed. The robot undocked. Counts reported to me as correct. We closed the 12 mm port and 8 mm port with 0-vicryl sutures under direct visualization with a suture passer. All other 8 mm ports removed under direct visualization. The 8 mm ports in the right upper quadrant were then removed under direct visualization and hemostasis noted. Pneumoperitoneum was then released from the left upper quadrant port site. Hemostasis noted. The skin was then closed using interrupted 4-0 Monocryl sutures in subcuticular interrupted fashion. The region was cleaned with sterile normal saline. Skin glue applied.     Counts reported to me as correct    The patient's family updated      Electronically signed by Blake Mckay DO on 7/21/2021 at 6:31 PM

## 2021-07-21 NOTE — INTERVAL H&P NOTE
Pt Name: Adriana Taylor  MRN: [id number]  YOB: 1950  Date of evaluation: 7/21/2021    I have reviewed the patient's history and physical examination completed in pre-admission testing on 7/14/21. No changes to history or on examination today, unless noted below. None.     Mai Moncada, APRN - CNP  7/21/21  10:15 AM

## 2021-07-21 NOTE — ANESTHESIA PRE PROCEDURE
Department of Anesthesiology  Preprocedure Note       Name:  Mary Beard   Age:  65 y.o.  :  1950                                          MRN:  [id number]         Date:  2021      Surgeon: Wilma Morin):  Kitty Anthony DO    Procedure: Procedure(s):  XI ROBOTIC LAPAROSCOPIC CHOLECYSTECTOMY, POSSIBLE OPEN    Medications prior to admission:   Prior to Admission medications    Medication Sig Start Date End Date Taking? Authorizing Provider   acetaminophen (TYLENOL) 325 MG tablet Take 325 mg by mouth every 6 hours as needed   Yes Historical Provider, MD   atorvastatin (LIPITOR) 20 MG tablet Take 1 tablet by mouth daily 21  Yes Marek Daugherty MD   diclofenac sodium (VOLTAREN) 1 % GEL Apply 2 g topically 2 times daily 21  Yes Marek Daugherty MD   famotidine (PEPCID) 20 MG tablet Take 1 tablet by mouth 2 times daily 21  Yes Marek Daugherty MD   lisinopril (PRINIVIL;ZESTRIL) 10 MG tablet TAKE 1 TABLET BY MOUTH ONE TIME A DAY  21  Yes Marek Daugherty MD   ammonium lactate (LAC-HYDRIN) 12 % lotion Apply topically daily.  21  Yes Daquan Fenton MD   Risedronate Sodium 150 MG TABS Take 150 mg by mouth every 30 days 10/8/20  Yes Marek Daugherty MD   loratadine (CLARITIN) 10 MG capsule Take 1 capsule by mouth daily 20  Yes Marek Daugherty MD   MYRBETRIQ 50 MG TB24 Take 50 mg by mouth daily 20  Yes Marek Daugherty MD   sodium chloride (OCEAN) 0.65 % nasal spray 1 spray by Nasal route as needed for Congestion 16  Yes Marek Daugherty MD   SM ASPIRIN ADULT LOW STRENGTH 81 MG EC tablet TAKE 1 TABLET BY MOUTH ONE TIME A DAY  21   Marek Daugherty MD   ibuprofen (ADVIL;MOTRIN) 800 MG tablet Take 1 tablet by mouth every 6 hours as needed for Pain 17   Elie Sung PA-C       Current medications:    Current Facility-Administered Medications   Medication Dose Route Frequency Provider Last Rate Last Admin    ceFAZolin (ANCEF) 2000 mg in dextrose 5 % 50 mL IVPB  2,000 mg Intravenous Once Rhesydney Molina, DO        lactated ringers infusion 1,000 mL  1,000 mL Intravenous Continuous Katiana Orellana MD 50 mL/hr at 07/21/21 1036 1,000 mL at 07/21/21 1036    indocyanine green (IC-GREEN) syringe 5 mg  5 mg Intravenous Once Rhetta Tracy, DO           Allergies:     Allergies   Allergen Reactions    Influenza Vaccines      Body aches skin pain     Bee Venom Other (See Comments)     numbness    Other Other (See Comments)     Spider bites-red streaks down leg-hospitalized with last bite    Penicillins Hives       Problem List:    Patient Active Problem List   Diagnosis Code    Essential hypertension I10    Hearing problem H91.90    Smoking F17.200    Age-related osteoporosis without current pathological fracture M81.0    Positive FIT (fecal immunochemical test) R19.5    Acute recurrent maxillary sinusitis J01.01    Stress incontinence of urine N39.3    Personal history of tobacco use Z87.891    At high risk for falls Z91.81    Positive colorectal cancer screening using Cologuard test R19.5       Past Medical History:        Diagnosis Date    Arthritis     spine    Gallstones     Hearing loss     Hx of bronchitis     Hyperlipidemia     Hypertension     Seasonal allergies     Spondylolisthesis at L5-S1 level     s/p cage and fusion    Wellness examination 07/14/2021    pcp-Dr Abel Riedel Ahmad-last visit june 2021       Past Surgical History:        Procedure Laterality Date    COLONOSCOPY N/A 11/21/2020    COLONOSCOPY POLYPECTOMY SNARE/COLD BIOPSY, HOT BIOPSY SNARE AND PHOTOS performed by Bonnie Jose MD at [address] [place] [address]       L5-S1 fusion with cage    NASAL SINUS SURGERY      WISDOM TOOTH EXTRACTION         Social History:    Social History     Tobacco Use    Smoking status: Current Every Day Smoker     Packs/day: 1.00     Years: 35.00     Pack years: 35.00     Types: Cigarettes    Smokeless tobacco: Never Used   Substance Use Topics    Alcohol use: No                                Ready to quit: Not Answered  Counseling given: Not Answered      Vital Signs (Current):   Vitals:    07/21/21 1018   BP: (!) 180/87   Pulse: 53   Resp: 20   Temp: 96.8 °F (36 °C)   TempSrc: Temporal   SpO2: 99%   Weight: 145 lb (65.8 kg)   Height: 5' 2\" (1.575 m)                                              BP Readings from Last 3 Encounters:   07/21/21 (!) 180/87   07/14/21 (!) 177/82   06/18/21 (!) 145/70       NPO Status: Time of last liquid consumption: 2200                        Time of last solid consumption: 2200                        Date of last liquid consumption: 07/20/21                        Date of last solid food consumption: 07/20/21    BMI:   Wt Readings from Last 3 Encounters:   07/21/21 145 lb (65.8 kg)   07/14/21 145 lb (65.8 kg)   06/18/21 146 lb (66.2 kg)     Body mass index is 26.52 kg/m². CBC:   Lab Results   Component Value Date    WBC 5.8 07/14/2021    RBC 4.21 07/14/2021    HGB 13.3 07/14/2021    HCT 40.1 07/14/2021    MCV 95.2 07/14/2021    RDW 12.5 07/14/2021     07/14/2021       CMP:   Lab Results   Component Value Date     07/14/2021    K 4.1 07/14/2021     07/14/2021    CO2 27 07/14/2021    BUN 9 07/14/2021    CREATININE 0.51 07/14/2021    GFRAA >60 07/14/2021    LABGLOM >60 07/14/2021    GLUCOSE 105 07/14/2021    PROT 6.6 05/12/2021    CALCIUM 8.3 07/14/2021    BILITOT 0.39 05/12/2021    ALKPHOS 87 05/12/2021    AST 14 05/12/2021    ALT 10 05/12/2021       POC Tests: No results for input(s): POCGLU, POCNA, POCK, POCCL, POCBUN, POCHEMO, POCHCT in the last 72 hours.     Coags:   Lab Results   Component Value Date    PROTIME 11.0 07/14/2021    INR 1.0 07/14/2021       HCG (If Applicable): No results found for: PREGTESTUR, PREGSERUM, HCG, HCGQUANT     ABGs: No results found for: PHART, PO2ART, DFD5RUC, NYV3IWK, BEART, Z3KQRNHQ     Type & Screen (If Applicable):  No results found for: LABABO, [facility]    Drug/Infectious Status (If Applicable):  Lab Results   Component Value Date    HEPCAB NONREACTIVE 12/19/2016       COVID-19 Screening (If Applicable):   Lab Results   Component Value Date    COVID19 Not Detected 07/17/2021           Anesthesia Evaluation  Patient summary reviewed no history of anesthetic complications:   Airway: Mallampati: II  TM distance: >3 FB   Neck ROM: full  Mouth opening: > = 3 FB Dental:          Pulmonary:Negative Pulmonary ROS and normal exam                               Cardiovascular:    (+) hypertension: no interval change,       ECG reviewed  Rhythm: regular  Rate: normal                    Neuro/Psych:   Negative Neuro/Psych ROS              GI/Hepatic/Renal: Neg GI/Hepatic/Renal ROS            Endo/Other: Negative Endo/Other ROS                    Abdominal:             Vascular: negative vascular ROS. Other Findings:             Anesthesia Plan      general and regional     ASA 2       Induction: intravenous. Anesthetic plan and risks discussed with patient. Plan discussed with CRNA.                   Natalee Fernando MD   7/21/2021

## 2021-07-22 VITALS
RESPIRATION RATE: 16 BRPM | HEART RATE: 63 BPM | HEIGHT: 62 IN | WEIGHT: 145 LBS | OXYGEN SATURATION: 97 % | SYSTOLIC BLOOD PRESSURE: 158 MMHG | BODY MASS INDEX: 26.68 KG/M2 | DIASTOLIC BLOOD PRESSURE: 70 MMHG | TEMPERATURE: 97.8 F

## 2021-07-22 PROCEDURE — 2580000003 HC RX 258: Performed by: STUDENT IN AN ORGANIZED HEALTH CARE EDUCATION/TRAINING PROGRAM

## 2021-07-22 PROCEDURE — 6360000002 HC RX W HCPCS: Performed by: STUDENT IN AN ORGANIZED HEALTH CARE EDUCATION/TRAINING PROGRAM

## 2021-07-22 PROCEDURE — 96372 THER/PROPH/DIAG INJ SC/IM: CPT

## 2021-07-22 PROCEDURE — G0378 HOSPITAL OBSERVATION PER HR: HCPCS

## 2021-07-22 PROCEDURE — 6370000000 HC RX 637 (ALT 250 FOR IP): Performed by: STUDENT IN AN ORGANIZED HEALTH CARE EDUCATION/TRAINING PROGRAM

## 2021-07-22 RX ORDER — HYDROCODONE BITARTRATE AND ACETAMINOPHEN 5; 325 MG/1; MG/1
1 TABLET ORAL EVERY 6 HOURS PRN
Qty: 28 TABLET | Refills: 0 | Status: SHIPPED | OUTPATIENT
Start: 2021-07-22 | End: 2021-07-29

## 2021-07-22 RX ORDER — HYDROCODONE BITARTRATE AND ACETAMINOPHEN 5; 325 MG/1; MG/1
1 TABLET ORAL EVERY 6 HOURS PRN
Status: DISCONTINUED | OUTPATIENT
Start: 2021-07-22 | End: 2021-07-22 | Stop reason: HOSPADM

## 2021-07-22 RX ADMIN — ENOXAPARIN SODIUM 40 MG: 40 INJECTION SUBCUTANEOUS at 08:15

## 2021-07-22 RX ADMIN — ATORVASTATIN CALCIUM 20 MG: 20 TABLET, FILM COATED ORAL at 08:14

## 2021-07-22 RX ADMIN — OXYCODONE HYDROCHLORIDE AND ACETAMINOPHEN 1 TABLET: 5; 325 TABLET ORAL at 02:41

## 2021-07-22 RX ADMIN — SODIUM CHLORIDE, PRESERVATIVE FREE 10 ML: 5 INJECTION INTRAVENOUS at 08:15

## 2021-07-22 RX ADMIN — LISINOPRIL 10 MG: 10 TABLET ORAL at 08:14

## 2021-07-22 RX ADMIN — HYDROCODONE BITARTRATE AND ACETAMINOPHEN 1 TABLET: 5; 325 TABLET ORAL at 07:50

## 2021-07-22 RX ADMIN — FAMOTIDINE 20 MG: 20 TABLET, FILM COATED ORAL at 08:15

## 2021-07-22 ASSESSMENT — PAIN DESCRIPTION - LOCATION: LOCATION: ABDOMEN

## 2021-07-22 ASSESSMENT — PAIN - FUNCTIONAL ASSESSMENT: PAIN_FUNCTIONAL_ASSESSMENT: PREVENTS OR INTERFERES SOME ACTIVE ACTIVITIES AND ADLS

## 2021-07-22 ASSESSMENT — PAIN DESCRIPTION - PAIN TYPE: TYPE: SURGICAL PAIN

## 2021-07-22 ASSESSMENT — PAIN DESCRIPTION - DESCRIPTORS: DESCRIPTORS: ACHING

## 2021-07-22 ASSESSMENT — PAIN SCALES - GENERAL
PAINLEVEL_OUTOF10: 5
PAINLEVEL_OUTOF10: 5
PAINLEVEL_OUTOF10: 8
PAINLEVEL_OUTOF10: 7
PAINLEVEL_OUTOF10: 7

## 2021-07-22 ASSESSMENT — PAIN DESCRIPTION - ONSET: ONSET: ON-GOING

## 2021-07-22 ASSESSMENT — PAIN DESCRIPTION - FREQUENCY: FREQUENCY: CONTINUOUS

## 2021-07-22 ASSESSMENT — PAIN DESCRIPTION - PROGRESSION: CLINICAL_PROGRESSION: GRADUALLY IMPROVING

## 2021-07-22 NOTE — PROGRESS NOTES
Post Op Note    SUBJECTIVE  Pt s/p robotic laparoscopic cholecystectomy    OBJECTIVE  VITALS:  BP (!) 159/77   Pulse 62   Temp 97.8 °F (36.6 °C) (Oral)   Resp 16   Ht 5' 2\" (1.575 m)   Wt 145 lb (65.8 kg)   SpO2 100%   BMI 26.52 kg/m²         GENERAL:  awake and alert. No acute distress  CARDIOVASCULAR:  regular rate and rhythm   LUNGS:  No increased work of breathing, symmetric chest rise and fall  ABDOMEN:   Abdomen soft, appropriately tender, non-distended  INCISION: Incision clean/dry/intact with surgical glue in tact    ASSESSMENT  1. POD# 0 s/p robotic laparoscopic choleysystectomy    PLAN  1. Encourage ambulation  2. Encourage Incentive spirometry  3. Regular diet  4. Pain control  5. lovenox  6.  Monitor bowel function

## 2021-07-22 NOTE — DISCHARGE SUMMARY
NORCO  Take 1 tablet by mouth every 6 hours as needed for Pain for up to 7 days. ondansetron 4 MG tablet  Commonly known as: Zofran  Take 1 tablet by mouth every 8 hours as needed for Nausea or Vomiting        CONTINUE taking these medications    acetaminophen 325 MG tablet  Commonly known as: TYLENOL     ammonium lactate 12 % lotion  Commonly known as: Lac-Hydrin  Apply topically daily. atorvastatin 20 MG tablet  Commonly known as: Lipitor  Take 1 tablet by mouth daily     diclofenac sodium 1 % Gel  Commonly known as: VOLTAREN  Apply 2 g topically 2 times daily     famotidine 20 MG tablet  Commonly known as: PEPCID  Take 1 tablet by mouth 2 times daily     ibuprofen 800 MG tablet  Commonly known as: Advil  Take 1 tablet by mouth every 6 hours as needed for Pain     lisinopril 10 MG tablet  Commonly known as: PRINIVIL;ZESTRIL  TAKE 1 TABLET BY MOUTH ONE TIME A DAY     loratadine 10 MG capsule  Commonly known as: Claritin  Take 1 capsule by mouth daily     Myrbetriq 50 MG Tb24  Generic drug: mirabegron  Take 50 mg by mouth daily     Risedronate Sodium 150 MG Tabs  Take 150 mg by mouth every 30 days     SM Aspirin Adult Low Strength 81 MG EC tablet  Generic drug: aspirin  TAKE 1 TABLET BY MOUTH ONE TIME A DAY     sodium chloride 0.65 % nasal spray  Commonly known as: OCEAN  1 spray by Nasal route as needed for Congestion           Where to Get Your Medications      You can get these medications from any pharmacy    Bring a paper prescription for each of these medications  · HYDROcodone-acetaminophen 5-325 MG per tablet  · ondansetron 4 MG tablet       Diet: regular diet   Activity: - Avoid strenuous activity or exercise until cleared during follow-up appointment  - No driving or operating heavy machinery while taking narcotics   Wound Care: Keep skin glue intact to incisions. Wash gently with soap and water. Follow-up:   1. Dr. Nikkie Marcus in 1 week for your scheduled appointment  2.  Follow up in  the next few weeks with PCP: Maximo Ramirez MD,      Shelley Men, DO

## 2021-07-22 NOTE — CARE COORDINATION
Discharge [facility] Case Management Department  Written by: Nando Mina RN    Patient Name: Vamshi Agent  Attending Provider: Maylin Lynne DO  Admit Date: 2021  9:15 AM  MRN: [id number]  Account: [de-identified]                     : 1950  Discharge Date: 2021      Disposition: home    Nando Mina RN

## 2021-07-22 NOTE — CARE COORDINATION
Case Management Initial Discharge Plan  Alethea Schofield,             Met with:patient to discuss discharge plans. Information verified: address, contacts, phone number, , insurance Yes  Insurance Provider: [employer]    Emergency Contact/Next of Familia Blandon name & number:   Aayush Lennon (daughter) [phone]  Anjana West (daughter) [phone]  Who are involved in patient's support system? daughters    PCP: Salvatore Ledezma MD  Date of last visit: 1 month ago      Discharge Planning    Living Arrangements:  [address] has 1 story  4 stairs to climb to get into front door    Patient able to perform ADL's:Independent    Current Services (outpatient & in home) none  DME equipment: none  DME provider: n/a    Is patient receiving oral anticoagulation therapy? No    If indicated:   Physician managing anticoagulation treatment: n/a  Where does patient obtain lab work for ATC treatment? n/a      Potential Assistance Needed:  N/A    Patient agreeable to home care: No  [place] of choice provided:  n/a    Prior SNF/Rehab Placement and Facility: none  Agreeable to SNF/Rehab: No  [place] of choice provided: n/a     Evaluation: n/a    Expected Discharge date:  21    Patient expects to be discharged to: If home: is the family and/or caregiver wiling & able to provide support at home? yes  Who will be providing this support? Daughter will be staying with patient    Follow Up Appointment: Best Day/ Time: Tuesday AM    Transportation provider: daughter  Transportation arrangements needed for discharge: No    Readmission Risk              Risk of Unplanned Readmission:  0             Does patient have a readmission risk score greater than 14?: No  If yes, follow-up appointment must be made within 7 days of discharge. Goals of Care:       Educated patient on transitional options, provided freedom of choice and are agreeable with plan      Discharge Plan: Home independent.           Electronically signed by Ernestine Mcneal RN on 7/22/21 at 10:04 AM EDT

## 2021-07-22 NOTE — ANESTHESIA POSTPROCEDURE EVALUATION
Department of Anesthesiology  Postprocedure Note    Patient: Keegan Rojo  MRN: [id number]  YOB: 1950  Date of evaluation: 7/22/2021  Time:  8:21 AM     Procedure Summary     Date: 07/21/21 Room / Location: [address]    Anesthesia Start: 4227 Anesthesia Stop: 1851    Procedure: XI ROBOTIC LAPAROSCOPIC CHOLECYSTECTOMY, (N/A Abdomen) Diagnosis: (CHRONIC CHOLECYSTITIS)    Surgeons: Keith Melchor DO Responsible Provider: Alexis Mcleod MD    Anesthesia Type: general, regional ASA Status: 2          Anesthesia Type: general, regional    Trixie Phase I: Trixie Score: 8    Trixie Phase II:      Last vitals: Reviewed and per EMR flowsheets.        Anesthesia Post Evaluation    Patient location during evaluation: PACU  Patient participation: complete - patient participated  Level of consciousness: awake and alert  Airway patency: patent  Nausea & Vomiting: no nausea and no vomiting  Complications: no  Cardiovascular status: blood pressure returned to baseline  Respiratory status: acceptable  Hydration status: euvolemic

## 2021-07-22 NOTE — PROGRESS NOTES
General Surgery:  Daily Progress Note          POD # 1 s/p robotic assisted laparoscopic cholecystectomy          PATIENT NAME: Vamshi Vallejo     TODAY'S DATE: 7/22/2021   CC:  Postop pain     SUBJECTIVE:     Pt seen and examined at bedside. Afebrile, VSS. Some abdominal pain this morning but patient did not take PO meds due to she did not like how percocet worked for her in the past. Will try norco this morning. Denies nausea or emesis. She tolerated a turkey sandwich for dinner. Urinating spontaneously, UOP adequate. Ambulating. OBJECTIVE:   VITALS:  BP (!) 159/77   Pulse 62   Temp 97.8 °F (36.6 °C) (Oral)   Resp 16   Ht 5' 2\" (1.575 m)   Wt 145 lb (65.8 kg)   SpO2 100%   BMI 26.52 kg/m²      INTAKE/OUTPUT:      Intake/Output Summary (Last 24 hours) at 7/22/2021 6602  Last data filed at 7/21/2021 1842  Gross per 24 hour   Intake 900 ml   Output 10 ml   Net 890 ml       PHYSICAL EXAM:  General Appearance:  awake, alert, oriented, in no acute distress  HEENT:  Normocephalic, atraumatic, mucus membranes moist   Skin:  Skin color, texture, turgor normal. No rashes or lesions. Lungs:  Normal effort, no respiratory distress, no accessory muscle use   Heart:  Heart regular rate and rhythm  Abdomen:  Soft, nondistended, appropriate postop tenderness to palpation, no guarding or peritoneal signs   Incisions:CDI, no drainage, induration, or edema noted. Extremities: Extremities warm to touch, pink, with no edema. ASSESSMENT:  Active Hospital Problems    Diagnosis Date Noted    S/P laparoscopic cholecystectomy [Z90.49] 07/21/2021       65 y.o. female with acute on chronic cholecystitis   POD#1 s/p robotic assisted laparoscopic cholecystectomy     Plan:  1. Diet: Regular   2. Continue pain and nausea control   3. Encourage ambulation and incentive spirometer   4.  Plan for discharge today 7/22      Jose Luis Marie, DO  General Surgery PGY-3

## 2021-07-23 ENCOUNTER — TELEPHONE (OUTPATIENT)
Dept: FAMILY MEDICINE CLINIC | Age: 71
End: 2021-07-23

## 2021-07-23 LAB — SURGICAL PATHOLOGY REPORT: NORMAL

## 2021-07-23 NOTE — TELEPHONE ENCOUNTER
[facility] Transitions Initial Follow Up Call    Outreach made within 2 business days of discharge: Yes    Patient: Yadi Lew Patient : 1950   MRN: [id number]  Reason for Admission: cholecystectomy    Discharge Date: 21       Spoke with: PATIENT     Discharge department/facility: [facility] Interactive Patient Contact:  Was patient able to fill all prescriptions: Yes  Was patient instructed to bring all medications to the follow-up visit: Yes  Is patient taking all medications as directed in the discharge summary? Yes  Does patient understand their discharge instructions: Yes  Does patient have questions or concerns that need addressed prior to 7-14 day follow up office visit: no    PATIENT [id number] [facility] DID NOT WANT TO SCHEDULE ANOTHER APPT AT [address].    Follow Up  Future Appointments   Date Time Provider Adin Gonzalez   2021  1:00 PM Kristin Barrera DO bariatric brink Elisa Gray MA

## 2021-07-27 ENCOUNTER — TELEPHONE (OUTPATIENT)
Dept: ONCOLOGY | Age: 71
End: 2021-07-27

## 2021-07-27 DIAGNOSIS — Z87.891 PERSONAL HISTORY OF NICOTINE DEPENDENCE: Primary | ICD-10-CM

## 2021-07-27 NOTE — TELEPHONE ENCOUNTER
Our records indicate that your patient is due for their annual lung cancer screening follow up testing. For your convenience, we have pended the order for the scan for you. If you do not agree with the need for the test, please cancel the order and let us know. Sincerely,    [address] [place] [address] Screening Program    Auto printed reminder letter sent to patient.

## 2021-07-29 ENCOUNTER — TELEPHONE (OUTPATIENT)
Dept: PHARMACY | Facility: CLINIC | Age: 71
End: 2021-07-29

## 2021-07-29 DIAGNOSIS — N39.3 STRESS INCONTINENCE OF URINE: ICD-10-CM

## 2021-07-29 RX ORDER — MIRABEGRON 50 MG/1
TABLET, FILM COATED, EXTENDED RELEASE ORAL
Qty: 90 TABLET | Refills: 0 | Status: SHIPPED | OUTPATIENT
Start: 2021-07-29 | End: 2021-08-30

## 2021-07-29 NOTE — TELEPHONE ENCOUNTER
Please Approve or Refuse.   Send to Pharmacy per Pt's Request:      Next Visit Date:  Visit date not found   Last Visit Date: 5/6/2021    Hemoglobin A1C (%)   Date Value   12/19/2016 5.3             ( goal A1C is < 7)   BP Readings from Last 3 Encounters:   07/22/21 (!) 158/70   07/21/21 (!) 210/93   07/14/21 (!) 177/82          (goal 120/80)  BUN   Date Value Ref Range Status   07/14/2021 9 8 - 23 mg/dL Final     CREATININE   Date Value Ref Range Status   07/14/2021 0.51 0.50 - 0.90 mg/dL Final     Potassium   Date Value Ref Range Status   07/14/2021 4.1 3.7 - 5.3 mmol/L Final

## 2021-07-29 NOTE — TELEPHONE ENCOUNTER
[facility] CLINICAL PHARMACY REVIEW: ADHERENCE REVIEW  Identified care gap per [employer]; fills at [facility]: ACE/ARB and Statin adherence    Last Visit: 5/6/21    Patient identified as LIS = 1, therefore patient may be able to receive a 90-day supply for the same cost as a 30-day supply    Patient also appears to be prescribed: Atorvastatin 20mg,  Lisinopril 10mg    Patient found in Outcomes MTM and is currently eligible for TIP    ASSESSMENT  ACE/ARB ADHERENCE    Per Insurance Records through [employer] (2020 [facility] = 93%; YTD [place] = 69%; Potential Fail Date: 7/30/21):   Lisinopril last filled on 5/5/21 for 30 day supply. Next refill due: 6/4/21    Per Reconciled Dispense Report:  Lisinopril last filled on 5/5/21 for 30 day supply. Per [address]   Lisinopril last picked up on 5/7/21 for 30 day supply. 0 refills remaining. Billed through [employer]     BP Readings from Last 3 Encounters:   07/22/21 (!) 158/70   07/21/21 (!) 210/93   07/14/21 (!) 177/82     Estimated Creatinine Clearance: 89 mL/min (based on SCr of 0.51 mg/dL). STATIN ADHERENCE    Per Insurance Records through [facility] = 100%; Potential Fail Date: 9/28/21): Atorvastatin last filled on 7/2/21 for 30 day supply. Next refill due: 8/15/21    Per Reconciled Dispense Report:  Atorvastatin last filled on 7/2/21 for 30 day supply. Per [address]   Atorvastatin last picked up on 8/2/21 for 30 day supply. 0 refills remaining.  Billed through [employer]   Component Value Date    CHOL 204 (H) 05/12/2021    TRIG 122 05/12/2021    HDL 47 05/12/2021    LDLCHOLESTEROL 133 (H) 05/12/2021     ALT   Date Value Ref Range Status   05/12/2021 10 5 - 33 U/L Final     AST   Date Value Ref Range Status   05/12/2021 14 <32 U/L Final     The 10-year ASCVD risk score (Cyndie Rico et al., 2013) is: 33.9%    Values used to calculate the score:      Age: 65 years      Sex: Female      Is Non- : No      Diabetic: No

## 2021-07-30 ENCOUNTER — OFFICE VISIT (OUTPATIENT)
Dept: BARIATRICS/WEIGHT MGMT | Age: 71
End: 2021-07-30

## 2021-07-30 VITALS
SYSTOLIC BLOOD PRESSURE: 132 MMHG | WEIGHT: 145 LBS | BODY MASS INDEX: 26.68 KG/M2 | HEART RATE: 64 BPM | TEMPERATURE: 97.1 F | HEIGHT: 62 IN | DIASTOLIC BLOOD PRESSURE: 80 MMHG | RESPIRATION RATE: 20 BRPM

## 2021-07-30 DIAGNOSIS — Z90.49 S/P CHOLECYSTECTOMY: Primary | ICD-10-CM

## 2021-07-30 PROCEDURE — 99024 POSTOP FOLLOW-UP VISIT: CPT | Performed by: SURGERY

## 2021-08-01 NOTE — PROGRESS NOTES
[facility]  [address]  [address]  [place] [address]  Dept: [phone]     7/30/2021     CC: Post cholecystectomy     History:  65year old who is 1 week post cholecystectomy. No nausea, vomiting, fevers/chills. No chest pain or shortness of breath. Tolerating diet. Nausea and epigastric pain resolved. Had a BM. No new complaints     Review of Systems:  General  Negative for: []? Weight Change   [x]? Fatigue   [x]? Fevers & Chills    []? Appetite change []? Other:     Positive for: [x]? Weight Change   []? Fatigue   []? Fevers & Chills    []? Appetite change []? Other:   Cardiac  Negative for: [x]? Chest Pain   [x]? Difficulty Breathing   []? Leg Cramps [x]? Edema of Lower Extremeties    []? Left   []? Right      Positive for: []? Chest Pain   []? Difficulty Breathing   []? Leg Cramps []? Edema of Lower Extremeties    []? Left   []? Right   Pulmonary  Negative for: [x]? Shortness of Breath []? Wheeze [x]? Cough  []? Calf Pain      Positive for: []? Shortness of Breath []? Wheeze []? Cough  []? Calf Pain   Gastro-Intestinal Negative for: []? Heartburn   []? Reflux   []? Dysphagia   []? Melena   []? BRBPR  [x]? Vomiting   [x]? Abdominal Pain   []? Diarrhea     []? Constipation  []? Other:      Positive for: []? Heartburn   []? Reflux   []? Dysphagia   []? Melena   []? BRBPR  []? Vomiting   []? Abdominal Pain   []? Diarrhea     []? Constipation  []? Other:    Muskuloskeletal Negative for: []? Joint pain   []? Back pain   []? Knee Pain   [x]? Muscle weakness [x]? Hernia   []? Edema []? Other:      Positive for: []? Joint pain   []? Back pain   []? Knee Pain   []? Muscle weakness []? Hernia   []? Edema []? Other:    Neurologic Negative for: [x]? Syncope   [x]? Insomnia   []? Being treated for depression  []? Other:      Positive for: []? Syncope   []? Insomnia   []? Being treated for depression  []? Other:    Skin Negative for: []? Wound:   []? Open   []? Draining   []? Incisional     [x]? Rash   [x]? Hair Loss  []? Other:      Positive for: []? Wound:   []? Open   []? Draining    []? Incisional  []? Rash   []? Hair Loss  []? Other:        Physical Exam:  /80 (Site: Right Upper Arm, Position: Sitting, Cuff Size: Medium Adult)   Pulse 64   Temp 97.1 °F (36.2 °C) (Temporal)   Resp 20   Ht 5' 2\" (1.575 m)   Wt 145 lb (65.8 kg)   BMI 26.52 kg/m²     Constitutional:  Vital signs are normal. The patient appears well-developed and well-nourished. HEENT:   Head: Normocephalic. Atraumatic  Eyes: pupils are equal and reactive. No scleral icterus is present. Neck: No mass and no thyromegaly present. Cardiovascular: Normal rate, regular rhythm, S1 normal and S2 normal.  Radial pulses present   Pulmonary/Chest: Effort normal and breath sounds normal. No retractions  Abdominal: Soft. Normal appearance. There is no organomegaly. No tenderness. There is no rigidity, no rebound, no guarding and no Cardona's sign. Musculoskeletal:        Right lower leg: Normal. No tenderness and no edema. Left lower leg: Normal. No tenderness and no edema. Incisions CDI  Neurological: The patient is alert and oriented. Moving all 4 extremities, sensation grossly intact bilateral  Skin: Skin is warm, dry and intact. Psychiatric: The patient has a normal mood and affect.  Speech is normal and behavior is normal. Judgment and thought content normal. Cognition and memory are normal.      Assessment:  1 week post cholecystectomy       Plan:  Pathology reviewed with patient  Regular diet  No lifting over 20 lbs for 1 month  Ambulation  Overall doing well

## 2021-08-02 DIAGNOSIS — E78.2 MIXED HYPERLIPIDEMIA: ICD-10-CM

## 2021-08-02 DIAGNOSIS — I10 ESSENTIAL HYPERTENSION: ICD-10-CM

## 2021-08-02 RX ORDER — LISINOPRIL 10 MG/1
TABLET ORAL
Qty: 30 TABLET | Refills: 0 | Status: SHIPPED | OUTPATIENT
Start: 2021-08-02 | End: 2021-08-03 | Stop reason: SDUPTHER

## 2021-08-02 RX ORDER — ATORVASTATIN CALCIUM 20 MG/1
TABLET, FILM COATED ORAL
Qty: 30 TABLET | Refills: 0 | Status: SHIPPED | OUTPATIENT
Start: 2021-08-02 | End: 2021-08-03 | Stop reason: SDUPTHER

## 2021-08-02 NOTE — TELEPHONE ENCOUNTER
Please Approve or Refuse.   Send to Pharmacy per Pt's Request:      Next Visit Date:  Visit date not found   Last Visit Date: 5/6/2021    Hemoglobin A1C (%)   Date Value   12/19/2016 5.3             ( goal A1C is < 7)   BP Readings from Last 3 Encounters:   07/30/21 132/80   07/22/21 (!) 158/70   07/21/21 (!) 210/93          (goal 120/80)  BUN   Date Value Ref Range Status   07/14/2021 9 8 - 23 mg/dL Final     CREATININE   Date Value Ref Range Status   07/14/2021 0.51 0.50 - 0.90 mg/dL Final     Potassium   Date Value Ref Range Status   07/14/2021 4.1 3.7 - 5.3 mmol/L Final

## 2021-08-03 DIAGNOSIS — I10 ESSENTIAL HYPERTENSION: ICD-10-CM

## 2021-08-03 DIAGNOSIS — E78.2 MIXED HYPERLIPIDEMIA: ICD-10-CM

## 2021-08-03 NOTE — TELEPHONE ENCOUNTER
Noted that refills for atorvastatin and lisinopril were both approved on 8/2/21 for 30-ds 0RR. Will pend 90-ds refill request in separate encounter (unable to do so in this encounter since refills were approved after this encounter was opened).     Arabella Ho, PharmD, [facility]  Direct: [phone]  Department, toll free [phone], option 7

## 2021-08-03 NOTE — TELEPHONE ENCOUNTER
Spoke with patient and she said he haven't refilled because they gave her 60 pills for Lisinopril but now wants a 90ds instead and sometimes she forgets. She also wants Atorvastatin for a 90ds.     Lisinopril 10mg (Sig: TAKE 1 TABLET BY MOUTH EVERY DAY)    Atorvastatin (Sig: TAKE 1 TABLET BY MOUTH EVERY DAY)    Authorizing Provider: Natty Gutierres MD

## 2021-08-03 NOTE — TELEPHONE ENCOUNTER
Shraddha Anthony MD    Patient requests 90-day supplies of lisinopril and atorvastatin to improve medication adherence. I have pended refill requests for your convenience. Last OV on 5/6/21.      Thank you,   Arabella Ho, PharmD, [facility]  Direct: [phone]  Department, toll free [phone], option 7 FAMILY HISTORY:  FH: breast cancer, mother,  at age 50's  FH: lung cancer, mother  FH: pancreatic cancer, father, in his 70's

## 2021-08-04 RX ORDER — ATORVASTATIN CALCIUM 20 MG/1
TABLET, FILM COATED ORAL
Qty: 90 TABLET | Refills: 1 | Status: SHIPPED | OUTPATIENT
Start: 2021-08-04 | End: 2022-03-08 | Stop reason: SDUPTHER

## 2021-08-04 RX ORDER — LISINOPRIL 10 MG/1
TABLET ORAL
Qty: 90 TABLET | Refills: 1 | Status: SHIPPED | OUTPATIENT
Start: 2021-08-04 | End: 2022-03-08 | Stop reason: SDUPTHER

## 2021-08-04 NOTE — TELEPHONE ENCOUNTER
Per [facility], Atorvastatin LPU on 21 for a 30ds and Lisinopril LPU on 21 for a 30ds with 0rr    For Pharmacy Admin Tracking Only     CPA in place:  No   Recommendation Provided To: Patient/Caregiver: 2 via Telephone   Intervention Detail: Adherence Monitorin and New Rx: 2, reason: Improve Adherence   Gap Closed?: Yes    Intervention Accepted By: Patient/Caregiver: 2   Time Spent (min): 20

## 2021-08-28 DIAGNOSIS — N39.3 STRESS INCONTINENCE OF URINE: ICD-10-CM

## 2021-08-28 DIAGNOSIS — I10 ESSENTIAL HYPERTENSION: ICD-10-CM

## 2021-08-30 RX ORDER — MIRABEGRON 50 MG/1
TABLET, FILM COATED, EXTENDED RELEASE ORAL
Qty: 90 TABLET | Refills: 0 | Status: SHIPPED | OUTPATIENT
Start: 2021-08-30

## 2021-08-30 RX ORDER — ASPIRIN 81 MG/1
TABLET, DELAYED RELEASE ORAL
Qty: 90 TABLET | Refills: 0 | Status: SHIPPED | OUTPATIENT
Start: 2021-08-30 | End: 2021-11-26

## 2021-09-07 ENCOUNTER — NURSE TRIAGE (OUTPATIENT)
Dept: OTHER | Facility: CLINIC | Age: 71
End: 2021-09-07

## 2021-09-07 NOTE — TELEPHONE ENCOUNTER
Reason for Disposition   Arm pain is a chronic symptom (recurrent or ongoing AND lasting > 4 weeks)    Answer Assessment - Initial Assessment Questions  1. ONSET: \"When did the pain start? \"      3 months    2. LOCATION: \"Where is the pain located? \"      Left shoulder into arm    3. PAIN: \"How bad is the pain? \" (Scale 1-10; or mild, moderate, severe)    - MILD (1-3): doesn't interfere with normal activities    - MODERATE (4-7): interferes with normal activities (e.g., work or school) or awakens from sleep    - SEVERE (8-10): excruciating pain, unable to do any normal activities, unable to hold a cup of water      8/10    4. WORK OR EXERCISE: \"Has there been any recent work or exercise that involved this part of the body? \"      Denies    5. CAUSE: \"What do you think is causing the arm pain? \"      Unsure    6. OTHER SYMPTOMS: \"Do you have any other symptoms? \" (e.g., neck pain, swelling, rash, fever, numbness, weakness)      Sometimes tingling    7. PREGNANCY: \"Is there any chance you are pregnant? \" \"When was your last menstrual period? \"      n/a    Protocols used: ARM PAIN-ADULT-[place]    Received call from  [facility] with [employer]. Brief description of triage: as above has left shoulder arm pain since June and was seen in er last week took prednisone and no better pain sl worse has an appointment set up already for Thursday since no bettter     Triage indicates for patient to be seen in 2 weeks    Care advice provided, patient verbalizes understanding; denies any other questions or concerns; instructed to call back for any new or worsening symptoms. Attention Provider: Thank you for allowing me to participate in the care of your patient. The patient was connected to triage in response to information provided to the [facility]. Please do not respond through this encounter as the response is not directed to a shared pool.

## 2021-09-09 ENCOUNTER — TELEPHONE (OUTPATIENT)
Dept: FAMILY MEDICINE CLINIC | Age: 71
End: 2021-09-09

## 2021-09-09 ENCOUNTER — OFFICE VISIT (OUTPATIENT)
Dept: FAMILY MEDICINE CLINIC | Age: 71
End: 2021-09-09
Payer: MEDICARE

## 2021-09-09 ENCOUNTER — HOSPITAL ENCOUNTER (OUTPATIENT)
Dept: GENERAL RADIOLOGY | Age: 71
Discharge: HOME | End: 2021-09-11
Payer: MEDICARE

## 2021-09-09 ENCOUNTER — HOSPITAL ENCOUNTER (OUTPATIENT)
Age: 71
Discharge: HOME | End: 2021-09-11
Payer: MEDICARE

## 2021-09-09 VITALS
HEIGHT: 62 IN | SYSTOLIC BLOOD PRESSURE: 126 MMHG | OXYGEN SATURATION: 98 % | TEMPERATURE: 97.6 F | WEIGHT: 144.2 LBS | HEART RATE: 73 BPM | BODY MASS INDEX: 26.54 KG/M2 | DIASTOLIC BLOOD PRESSURE: 88 MMHG

## 2021-09-09 DIAGNOSIS — S43.422A SPRAIN OF LEFT ROTATOR CUFF CAPSULE, INITIAL ENCOUNTER: Primary | ICD-10-CM

## 2021-09-09 DIAGNOSIS — J32.9 SINUSITIS, UNSPECIFIED CHRONICITY, UNSPECIFIED LOCATION: ICD-10-CM

## 2021-09-09 DIAGNOSIS — I10 ESSENTIAL HYPERTENSION: ICD-10-CM

## 2021-09-09 DIAGNOSIS — S43.422A SPRAIN OF LEFT ROTATOR CUFF CAPSULE, INITIAL ENCOUNTER: ICD-10-CM

## 2021-09-09 DIAGNOSIS — M25.512 ACUTE PAIN OF LEFT SHOULDER: ICD-10-CM

## 2021-09-09 PROCEDURE — 99214 OFFICE O/P EST MOD 30 MIN: CPT | Performed by: FAMILY MEDICINE

## 2021-09-09 PROCEDURE — 73030 X-RAY EXAM OF SHOULDER: CPT

## 2021-09-09 RX ORDER — BACLOFEN 10 MG/1
10 TABLET ORAL NIGHTLY PRN
Qty: 30 TABLET | Refills: 0 | Status: SHIPPED | OUTPATIENT
Start: 2021-09-09 | End: 2021-11-02 | Stop reason: ALTCHOICE

## 2021-09-09 RX ORDER — ECHINACEA PURPUREA EXTRACT 125 MG
1 TABLET ORAL PRN
Qty: 1 EACH | Refills: 1 | Status: SHIPPED | OUTPATIENT
Start: 2021-09-09 | End: 2022-04-07

## 2021-09-09 RX ORDER — CYCLOBENZAPRINE HCL 10 MG
10 TABLET ORAL NIGHTLY PRN
Qty: 30 TABLET | Refills: 0 | Status: SHIPPED | OUTPATIENT
Start: 2021-09-09 | End: 2021-09-10 | Stop reason: ALTCHOICE

## 2021-09-09 RX ORDER — TIZANIDINE 4 MG/1
4 TABLET ORAL 3 TIMES DAILY
Qty: 30 TABLET | Refills: 0 | Status: SHIPPED | OUTPATIENT
Start: 2021-09-09

## 2021-09-09 RX ORDER — LIDOCAINE 4 G/G
1 PATCH TOPICAL DAILY
Qty: 30 PATCH | Refills: 0 | Status: SHIPPED | OUTPATIENT
Start: 2021-09-09 | End: 2021-10-09

## 2021-09-09 RX ORDER — MELOXICAM 7.5 MG/1
7.5 TABLET ORAL 2 TIMES DAILY PRN
Qty: 60 TABLET | Refills: 0 | Status: SHIPPED | OUTPATIENT
Start: 2021-09-09 | End: 2022-04-07 | Stop reason: ALTCHOICE

## 2021-09-09 SDOH — ECONOMIC STABILITY: FOOD INSECURITY: WITHIN THE PAST 12 MONTHS, YOU WORRIED THAT YOUR FOOD WOULD RUN OUT BEFORE YOU GOT MONEY TO BUY MORE.: OFTEN TRUE

## 2021-09-09 SDOH — ECONOMIC STABILITY: FOOD INSECURITY: WITHIN THE PAST 12 MONTHS, THE FOOD YOU BOUGHT JUST DIDN'T LAST AND YOU DIDN'T HAVE MONEY TO GET MORE.: OFTEN TRUE

## 2021-09-09 ASSESSMENT — ENCOUNTER SYMPTOMS
ABDOMINAL PAIN: 0
NAUSEA: 0
SINUS PAIN: 0
CHEST TIGHTNESS: 0
BACK PAIN: 1
PHOTOPHOBIA: 0
ABDOMINAL DISTENTION: 0
DIARRHEA: 0
SHORTNESS OF BREATH: 0
APNEA: 0
COUGH: 0

## 2021-09-09 ASSESSMENT — SOCIAL DETERMINANTS OF HEALTH (SDOH): HOW HARD IS IT FOR YOU TO PAY FOR THE VERY BASICS LIKE FOOD, HOUSING, MEDICAL CARE, AND HEATING?: SOMEWHAT HARD

## 2021-09-09 NOTE — PROGRESS NOTES
Chief Complaint   Patient presents with    Arm Pain     urgent care 1 week ago          Juan M Cohen  here today for follow up on chronic medical problems, go over labs and/or diagnostic studies, and medication refills. Arm Pain (urgent care 1 week ago )      HPI: Patient is here for urgent care follow-up. Patient was seen in the urgent care 1 week before for left shoulder pain. Patient reports she started having sudden pain in the left shoulder about 10 on scale, patient had cholecystectomy done  Patient reports she tried topical lidocaine cream, muscle relaxant and the also give her steroids but she did not have any improvement in pain. She still has difficulty in moving her left arm, did not had any x-rays done. She denies any acute trauma. Patient reports she feels spasm and pain in her left neck and shoulder blade which radiates to left arm. She is wearing left elbow brace which gives mild relief. Shoulder Pain   The pain is present in the left arm and left shoulder. This is a new problem. The current episode started 1 to 4 weeks ago. There has been no history of extremity trauma. The problem occurs constantly. The problem has been unchanged. The quality of the pain is described as aching. The pain is at a severity of 8/10. The pain is severe. Associated symptoms include a limited range of motion and stiffness. Pertinent negatives include no fever, inability to bear weight, joint locking or joint swelling. The symptoms are aggravated by activity. She has tried acetaminophen and NSAIDS for the symptoms. The treatment provided no relief. Family history does not include rheumatoid arthritis. Her past medical history is significant for osteoarthritis. There is no history of gout.      The following portions of the patient's history were reviewed and updated as appropriate: allergies, current medications, past family history, past medical history, past social history, past surgical history and problem list.       /88   Pulse 73   Temp 97.6 °F (36.4 °C) (Temporal)   Ht 5' 2\" (1.575 m)   Wt 144 lb 3.2 oz (65.4 kg)   SpO2 98%   BMI 26.37 kg/m²    Body mass index is 26.37 kg/m². Wt Readings from Last 3 Encounters:   09/09/21 144 lb 3.2 oz (65.4 kg)   07/30/21 145 lb (65.8 kg)   07/21/21 145 lb (65.8 kg)        [x]Negative depression screening. PHQ Scores 5/6/2021 10/6/2020 2/13/2020 4/4/2018   PHQ2 Score 0 0 1 0   PHQ9 Score 0 0 1 0      []1-4 = Minimal depression   []5-9 = Milddepression   []10-14 = Moderate depression   []15-19 = Moderately severe depression   []20-27 = Severe depression    Discussed testing with the patient and all questions fully answered. Admission on 07/21/2021, Discharged on 07/22/2021   Component Date Value Ref Range Status    Surgical Pathology Report 07/21/2021    Final                    Value:-- Diagnosis --  GALLBLADDER:       -CHRONIC CHOLECYSTITIS. Thea Coto M.D.  **Electronically Signed Out**         [facility]/7/23/2021       Clinical Information  Pre-op Diagnosis:  CHRONIC CHOLECYSTITIS   Operative Findings:  GALLBLADDER   Operation Performed:  LAPAROSCOPIC CHOLECYSTECTOMY     Source of Specimen  1: GALLBLADDER    Gross Description  \"JOSE BUENROSTROU, GALLBLADDER\" 6.3 x 3.2 x 2.4 cm intact gallbladder with  a 0.5 cm long x 0.6 cm in diameter cystic duct. The serosa is  green-gray, and the liver bed is coarse tan-green. The wall is 0.1 cm  in thickness, and the lumen contains approximately 20 cc of tenacious  green bile. No calculi are identified. The mucosa is green and  velvety. Cystic duct margin and sections of gallbladder mucosa 1cs. tm      Microscopic Description  Microscopic examination performed. SURGICAL PATHOLOGY CONSULTATION       Patient Name: Jonathon Bullard [facility] Rec: [id number]  Path Number: [id number]    [facility] [address] [place] [address], [place] [address].   [facility], [address]  [phone]  Fax: [phone]           Most recent labs reviewed:     Lab Results   Component Value Date    WBC 5.8 07/14/2021    HGB 13.3 07/14/2021    HCT 40.1 07/14/2021    MCV 95.2 07/14/2021     07/14/2021       @BRIEFLAB(NA,K,CL,CO2,BUN,CREATININE,GLUCOSE,CALCIUM)@     Lab Results   Component Value Date    ALT 10 05/12/2021    AST 14 05/12/2021    ALKPHOS 87 05/12/2021    BILITOT 0.39 05/12/2021       Lab Results   Component Value Date    TSH 3.05 05/12/2021       Lab Results   Component Value Date    CHOL 204 (H) 05/12/2021    CHOL 154 12/19/2016     Lab Results   Component Value Date    TRIG 122 05/12/2021    TRIG 143 12/19/2016     Lab Results   Component Value Date    HDL 47 05/12/2021    HDL 46 12/19/2016     Lab Results   Component Value Date    LDLCHOLESTEROL 133 (H) 05/12/2021    LDLCHOLESTEROL 79 12/19/2016     Lab Results   Component Value Date    VLDL NOT REPORTED 05/12/2021    VLDL NOT REPORTED 12/19/2016     Lab Results   Component Value Date    CHOLHDLRATIO 4.3 05/12/2021    CHOLHDLRATIO 3.3 12/19/2016       Lab Results   Component Value Date    LABA1C 5.3 12/19/2016       No results found for: IQUTCISQ12    No results found for: FOLATE    No results found for: IRON, TIBC, FERRITIN    No results found for: VITD25          Current Outpatient Medications   Medication Sig Dispense Refill    sodium chloride (OCEAN) 0.65 % nasal spray 1 spray by Nasal route as needed for Congestion 1 each 1    lidocaine 4 % external patch Place 1 patch onto the skin daily 30 patch 0    meloxicam (MOBIC) 7.5 MG tablet Take 1 tablet by mouth 2 times daily as needed for Pain 60 tablet 0    cyclobenzaprine (FLEXERIL) 10 MG tablet Take 1 tablet by mouth nightly as needed for Muscle spasms 30 tablet 0    baclofen (LIORESAL) 10 MG tablet Take 1 tablet by mouth nightly as needed (pain) 30 tablet 0    MYRBETRIQ 50 MG TB24 TAKE 1 TABLET BY MOUTH ONE TIME A DAY  90 tablet 0    SM ASPIRIN ADULT LOW STRENGTH 81 MG EC tablet TAKE 1 TABLET BY MOUTH EVERY DAY  90 tablet 0    atorvastatin (LIPITOR) 20 MG tablet TAKE 1 TABLET BY MOUTH EVERY DAY 90 tablet 1    lisinopril (PRINIVIL;ZESTRIL) 10 MG tablet TAKE 1 TABLET BY MOUTH EVERY DAY 90 tablet 1    acetaminophen (TYLENOL) 325 MG tablet Take 325 mg by mouth every 6 hours as needed      diclofenac sodium (VOLTAREN) 1 % GEL Apply 2 g topically 2 times daily 200 g 0    Risedronate Sodium 150 MG TABS Take 150 mg by mouth every 30 days 12 tablet 3    loratadine (CLARITIN) 10 MG capsule Take 1 capsule by mouth daily 60 capsule 3    ibuprofen (ADVIL;MOTRIN) 800 MG tablet Take 1 tablet by mouth every 6 hours as needed for Pain 20 tablet 0    ammonium lactate (LAC-HYDRIN) 12 % lotion Apply topically daily. (Patient not taking: Reported on 9/9/2021) 225 g 2     No current facility-administered medications for this visit. Social History     Socioeconomic History    Marital status:      Spouse name: Not on file    Number of children: Not on file    Years of education: Not on file    Highest education level: Not on file   Occupational History    Not on file   Tobacco Use    Smoking status: Current Every Day Smoker     Packs/day: 1.00     Years: 35.00     Pack years: 35.00     Types: Cigarettes    Smokeless tobacco: Never Used   Vaping Use    Vaping Use: Never used   Substance and Sexual Activity    Alcohol use: No    Drug use: No    Sexual activity: Not on file   Other Topics Concern    Not on file   Social History Narrative    Not on file     Social Determinants of Health     Financial Resource Strain: Medium Risk    Difficulty of Paying Living Expenses: Somewhat hard   Food Insecurity: Food Insecurity Present    Worried About Running Out of Food in the Last Year: Often true    Norma of Food in the Last Year: Often true   Transportation Needs:     Lack of Transportation (Medical):      Lack of Transportation (Non-Medical): Physical Activity:     Days of Exercise per Week:     Minutes of Exercise per Session:    Stress:     Feeling of Stress :    Social Connections:     Frequency of Communication with Friends and Family:     Frequency of Social Gatherings with Friends and Family:     Attends Restoration Services:     Active Member of Clubs or Organizations:     Attends Club or Organization Meetings:     Marital Status:    Intimate Partner Violence:     Fear of Current or Ex-Partner:     Emotionally Abused:     Physically Abused:     Sexually Abused:      Ready to quit: Yes  Counseling given: Yes        Family History   Problem Relation Age of Onset    Heart Attack Maternal Grandmother     Diabetes Father     Stroke Father     Dementia Mother              -rest of complaints with corresponding details per ROS    The patient's past medical, surgical, social, and family history as well as her current medications and allergies were reviewed as documented intoday's encounter. Review of Systems   Constitutional: Positive for activity change. Negative for appetite change, diaphoresis, fatigue and unexpected weight change. HENT: Negative for congestion and sinus pain. Eyes: Negative for photophobia and visual disturbance. Respiratory: Negative for apnea, cough, chest tightness and shortness of breath. Cardiovascular: Negative for chest pain. Gastrointestinal: Negative for abdominal distention, abdominal pain, diarrhea and nausea. Genitourinary: Negative for difficulty urinating, flank pain, frequency, pelvic pain, urgency and vaginal pain. Musculoskeletal: Positive for arthralgias, back pain, gait problem, neck pain and neck stiffness. Negative for joint swelling and myalgias. Left shoulder pain   Neurological: Positive for weakness and numbness. Negative for dizziness, syncope, speech difficulty, light-headedness and headaches. Psychiatric/Behavioral: Positive for dysphoric mood.  Negative for patient about the conservative treatment, start on lidocaine patch, x-ray shoulder meloxicam.  Physical therapy  - lidocaine 4 % external patch; Place 1 patch onto the skin daily  Dispense: 30 patch; Refill: 0  - XR SHOULDER LEFT (MIN 2 VIEWS); Future  - meloxicam (MOBIC) 7.5 MG tablet; Take 1 tablet by mouth 2 times daily as needed for Pain  Dispense: 60 tablet; Refill: 0  - [facility]  - cyclobenzaprine (FLEXERIL) 10 MG tablet; Take 1 tablet by mouth nightly as needed for Muscle spasms  Dispense: 30 tablet; Refill: 0  - baclofen (LIORESAL) 10 MG tablet; Take 1 tablet by mouth nightly as needed (pain)  Dispense: 30 tablet; Refill: 0    2. Acute pain of left shoulder  Supporting conservative treatment  - lidocaine 4 % external patch; Place 1 patch onto the skin daily  Dispense: 30 patch; Refill: 0  - meloxicam (MOBIC) 7.5 MG tablet; Take 1 tablet by mouth 2 times daily as needed for Pain  Dispense: 60 tablet; Refill: 0  - [facility]  - cyclobenzaprine (FLEXERIL) 10 MG tablet; Take 1 tablet by mouth nightly as needed for Muscle spasms  Dispense: 30 tablet; Refill: 0  - baclofen (LIORESAL) 10 MG tablet; Take 1 tablet by mouth nightly as needed (pain)  Dispense: 30 tablet; Refill: 0    3. Essential hypertension  Controlled continue same medications    4. Sinusitis, unspecified chronicity, unspecified location    - sodium chloride (OCEAN) 0.65 % nasal spray; 1 spray by Nasal route as needed for Congestion  Dispense: 1 each;  Refill: 1      Orders Placed This Encounter   Procedures    XR SHOULDER LEFT (MIN 2 VIEWS)     Standing Status:   Future     Number of Occurrences:   1     Standing Expiration Date:   9/9/2022     Order Specific Question:   Reason for exam:     Answer:   pain   [address]     Referral Priority:   Routine     Referral Type:   Eval and Treat     Referral Reason:   Specialty Services Required     Requested Specialty: Physical Therapy     Number of Visits Requested:   1         Medications Discontinued During This Encounter   Medication Reason    famotidine (PEPCID) 20 MG tablet Therapy completed    ondansetron (ZOFRAN) 4 MG tablet Therapy completed    sodium chloride (OCEAN) 0.65 % nasal spray OBIEBRETT Camejo received counseling on the following healthy behaviors: nutrition, exercise and medication adherence  Reviewed prior labs and health maintenance  Continue current medications, diet and exercise. Discussed use, benefit, and side effects of prescribed medications. Barriers to medication compliance addressed. Patient given educational materials - see patient instructions  Was a self-tracking handout given in paper form or via [employer]? Yes    Requested Prescriptions     Signed Prescriptions Disp Refills    sodium chloride (OCEAN) 0.65 % nasal spray 1 each 1     Si spray by Nasal route as needed for Congestion    lidocaine 4 % external patch 30 patch 0     Sig: Place 1 patch onto the skin daily    meloxicam (MOBIC) 7.5 MG tablet 60 tablet 0     Sig: Take 1 tablet by mouth 2 times daily as needed for Pain    cyclobenzaprine (FLEXERIL) 10 MG tablet 30 tablet 0     Sig: Take 1 tablet by mouth nightly as needed for Muscle spasms    baclofen (LIORESAL) 10 MG tablet 30 tablet 0     Sig: Take 1 tablet by mouth nightly as needed (pain)       All patient questions answered. Patient voiced understanding. Quality Measures    Body mass index is 26.37 kg/m². Elevated. Weight control planned discussed daily exercise regimen and Healthy diet and regular exercise. BP: 126/88. Blood pressure is normal. Treatment plan consists of Weight Reduction, DASH Eating Plan, Dietary Sodium Restriction, Increased Physical Activity and No treatment change needed.     Fall Risk 2021 10/6/2020 2020   2 or more falls in past year? no no yes   Fall with injury in past year? no no yes     The patient does not have a history of falls. I did , complete a risk assessment for falls. A plan of care for falls in-office gait and balance testing performed using The Timed Up and Go Test was negative for increased falls risk- no further intervention is currently indicated, home safety tips provided, No Treatment plan indicated    Lab Results   Component Value Date    LDLCHOLESTEROL 133 (H) 05/12/2021    (goal LDL reduction with dx if diabetes is 50% LDL reduction)    PHQ Scores 5/6/2021 10/6/2020 2/13/2020 4/4/2018   PHQ2 Score 0 0 1 0   PHQ9 Score 0 0 1 0     Interpretation of Total Score Depression Severity: 1-4 = Minimal depression, 5-9 = Mild depression, 10-14 = Moderate depression, 15-19 = Moderately severe depression, 20-27 = Severe depression      The patient'spast medical, surgical, social, and family history as well as her   current medications and allergies were reviewed as documented in today's encounter. Medications, labs, diagnostic studies, consultations andfollow-up as documented in this encounter. No follow-ups on file. Patient wasseen with total face to face time of 30 minutes. More than 50% of this visit was counseling and education. No future appointments. This note was completed by using the assistance of a speech-recognition program. However, inadvertent computerized transcription errors may be present. Althoughevery effort was made to ensure accuracy, no guarantees can be provided that every mistake has been identified and corrected by editing.   Electronically signed by Sabine Lopez MD on 9/9/2021  12:23 PM

## 2021-09-09 NOTE — PROGRESS NOTES
Visit Information    Have you changed or started any medications since your last visit including any over-the-counter medicines, vitamins, or herbal medicines? no   Are you having any side effects from any of your medications? -  no  Have you stopped taking any of your medications? Is so, why? -  no    Have you seen any other physician or provider since your last visit? No  Have you had any other diagnostic tests since your last visit? No  Have you been seen in the emergency room and/or had an admission to a hospital since we last saw you? No  Have you had your routine dental cleaning in the past 6 months? no    Have you activated your [employer] account? If not, what are your barriers?  Yes     Patient Care Team:  Al Falk MD as PCP - General (Family Medicine)  Al Falk MD as PCP - [facility]  Rosamaria Olsen MD as Surgeon (Otolaryngology)  Herrera Lundberg RN as Imaging Navigator    Medical History Review  Past Medical, Family, and Social History reviewed and does contribute to the patient presenting condition    Health Maintenance   Topic Date Due    COVID-19 Vaccine (1) Never done    Low dose CT lung screening  08/20/2021    Shingles Vaccine (1 of 2) 10/06/2021 (Originally 9/8/1998)    Annual Wellness Visit (AWV)  05/07/2022    Lipid screen  05/12/2022    Potassium monitoring  07/14/2022    Creatinine monitoring  07/14/2022    Breast cancer screen  09/18/2022    Colon cancer screen colonoscopy  11/21/2023    DTaP/Tdap/Td vaccine (2 - Td or Tdap) 04/04/2028    DEXA (modify frequency per FRAX score)  Completed    Pneumococcal 65+ years Vaccine  Completed    Hepatitis C screen  Completed    Hepatitis A vaccine  Aged Out    Hepatitis B vaccine  Aged Out    Hib vaccine  Aged Out    Meningococcal (ACWY) vaccine  Aged Out

## 2021-09-09 NOTE — PATIENT INSTRUCTIONS

## 2021-09-09 NOTE — TELEPHONE ENCOUNTER
[facility]. states you sent over 3 different scripts for muscle relaxers and they need to know what one you want filled only. Please advise.

## 2021-09-10 NOTE — TELEPHONE ENCOUNTER
Pt cannot take flexeril so she did not get that.  Pt picked up baclofen and they kept other on file in case baclofen does not work

## 2021-10-29 DIAGNOSIS — M81.0 AGE-RELATED OSTEOPOROSIS WITHOUT CURRENT PATHOLOGICAL FRACTURE: ICD-10-CM

## 2021-10-29 RX ORDER — RISEDRONATE SODIUM 150 MG/1
TABLET, FILM COATED ORAL
Qty: 12 TABLET | Refills: 0 | Status: SHIPPED | OUTPATIENT
Start: 2021-10-29 | End: 2022-03-08 | Stop reason: SDUPTHER

## 2021-10-29 NOTE — TELEPHONE ENCOUNTER
Please Approve or Refuse.   Send to Pharmacy per Pt's Request:      Next Visit Date:  Visit date not found   Last Visit Date: 9/9/2021    Hemoglobin A1C (%)   Date Value   12/19/2016 5.3             ( goal A1C is < 7)   BP Readings from Last 3 Encounters:   09/09/21 126/88   07/30/21 132/80   07/22/21 (!) 158/70          (goal 120/80)  BUN   Date Value Ref Range Status   07/14/2021 9 8 - 23 mg/dL Final     CREATININE   Date Value Ref Range Status   07/14/2021 0.51 0.50 - 0.90 mg/dL Final     Potassium   Date Value Ref Range Status   07/14/2021 4.1 3.7 - 5.3 mmol/L Final

## 2021-11-02 ENCOUNTER — VIRTUAL VISIT (OUTPATIENT)
Dept: FAMILY MEDICINE CLINIC | Age: 71
End: 2021-11-02
Payer: MEDICARE

## 2021-11-02 ENCOUNTER — TELEPHONE (OUTPATIENT)
Dept: FAMILY MEDICINE CLINIC | Age: 71
End: 2021-11-02

## 2021-11-02 ENCOUNTER — HOSPITAL ENCOUNTER (OUTPATIENT)
Age: 71
Setting detail: SPECIMEN
Discharge: HOME | End: 2021-11-02
Payer: MEDICARE

## 2021-11-02 ENCOUNTER — NURSE ONLY (OUTPATIENT)
Dept: FAMILY MEDICINE CLINIC | Age: 71
End: 2021-11-02

## 2021-11-02 DIAGNOSIS — Z20.822 SUSPECTED COVID-19 VIRUS INFECTION: ICD-10-CM

## 2021-11-02 DIAGNOSIS — Z20.822 SUSPECTED COVID-19 VIRUS INFECTION: Primary | ICD-10-CM

## 2021-11-02 PROCEDURE — 99443 PR PHYS/QHP TELEPHONE EVALUATION 21-30 MIN: CPT | Performed by: FAMILY MEDICINE

## 2021-11-02 RX ORDER — GUAIFENESIN 100 MG/5ML
10 SYRUP ORAL EVERY 4 HOURS PRN
Qty: 1 EACH | Refills: 1 | Status: SHIPPED | OUTPATIENT
Start: 2021-11-02 | End: 2021-11-02

## 2021-11-02 RX ORDER — IBUPROFEN 800 MG/1
800 TABLET ORAL EVERY 6 HOURS PRN
Qty: 20 TABLET | Refills: 0 | Status: SHIPPED | OUTPATIENT
Start: 2021-11-02

## 2021-11-02 RX ORDER — GUAIFENESIN 100 MG/5ML
10 SYRUP ORAL EVERY 4 HOURS PRN
Qty: 200 ML | Refills: 1 | Status: SHIPPED | OUTPATIENT
Start: 2021-11-02 | End: 2022-04-07 | Stop reason: ALTCHOICE

## 2021-11-02 RX ORDER — LORATADINE 10 MG/1
10 TABLET ORAL DAILY
Qty: 30 TABLET | Refills: 0 | Status: SHIPPED | OUTPATIENT
Start: 2021-11-02 | End: 2021-11-26

## 2021-11-02 RX ORDER — FLUTICASONE PROPIONATE 50 MCG
2 SPRAY, SUSPENSION (ML) NASAL DAILY
Qty: 16 G | Refills: 0 | Status: SHIPPED | OUTPATIENT
Start: 2021-11-02 | End: 2022-09-29 | Stop reason: SDUPTHER

## 2021-11-02 RX ORDER — ALBUTEROL SULFATE 90 UG/1
2 AEROSOL, METERED RESPIRATORY (INHALATION) 4 TIMES DAILY PRN
Qty: 54 G | Refills: 1 | Status: SHIPPED | OUTPATIENT
Start: 2021-11-02

## 2021-11-02 RX ORDER — DEXAMETHASONE 6 MG/1
6 TABLET ORAL 2 TIMES DAILY WITH MEALS
Qty: 20 TABLET | Refills: 0 | Status: SHIPPED | OUTPATIENT
Start: 2021-11-02 | End: 2021-11-12

## 2021-11-02 NOTE — TELEPHONE ENCOUNTER
----- Message from Carlos Courtney sent at 11/2/2021  4:16 PM EDT -----  Subject: Medication Problem    QUESTIONS  Name of Medication? guaiFENesin (ALTARUSSIN) 100 MG/5ML syrup  Patient-reported dosage and instructions? 100mg/5ml syrup  What question or problem do you have with the medication? [employer]   is questioning what the quantity is for this medication prescribed by Dr. Sameer Gu? Please advise  Preferred Pharmacy? [id number] [address] phone number (if available)? [phone]  Additional Information for Provider?   ---------------------------------------------------------------------------  --------------  [address]  What is the best way for the office to contact you? OK to leave message on   voicemail  Preferred Call Back Phone Number? [phone]  ---------------------------------------------------------------------------  --------------  SCRIPT ANSWERS  Relationship to Patient? Third Party  Representative Name?  [address]

## 2021-11-02 NOTE — PROGRESS NOTES
Marco Manriquez is a 66 y.o. female evaluated via telephone on 11/2/2021. Chief Complaint   Patient presents with    Headache    Congestion    Pharyngitis    Generalized Body Aches       Consent:  She and/or health care decision maker is aware that that she may receive a bill for this telephone service, depending on her insurance coverage, and has provided verbal consent to proceed: Yes      Documentation:  I communicated with the patient and/or health care decision maker about . Patient is scheduled for sick call for telephone call with symptoms of fever chills myalgias headache. Patient reports the symptoms started yesterday she woke up from sleep with the symptoms,, she feels fatigue and tired, with chills with fever of 101. Patient complains of mild wheezing and congestion. Patient reports she did not got in contact with any sick person. She is not vaccinated against COVID-19. Patient never had Covid in the past.  Patient denies any chest pain shortness of breath complains of mild chest tightness and cough. She denies any phlegm production. Patient reports her symptoms are not improving. Details of this discussion including any medical advice provided:   1. Suspected COVID-19 virus infection  Discussed with patient you need to get tested for Covid, contact number provided for lab. Orders done symptomatic treatment started can confirm for Covid infusion once test is back. Discussed with patient if symptoms do not improve please go to ER.  - ibuprofen (ADVIL;MOTRIN) 800 MG tablet; Take 1 tablet by mouth every 6 hours as needed for Pain  Dispense: 20 tablet; Refill: 0  - guaiFENesin (ALTARUSSIN) 100 MG/5ML syrup; Take 10 mLs by mouth every 4 hours as needed for Cough  Dispense: 1 each; Refill: 1  - fluticasone (FLONASE) 50 MCG/ACT nasal spray; 2 sprays by Nasal route daily  Dispense: 16 g; Refill: 0  - COVID-19; Future  - dexamethasone (DECADRON) 6 MG tablet;  Take 1 tablet by mouth 2 times daily (with meals) for 10 days  Dispense: 20 tablet; Refill: 0  - loratadine (CLARITIN) 10 MG tablet; Take 1 tablet by mouth daily  Dispense: 30 tablet; Refill: 0  - albuterol sulfate  (90 Base) MCG/ACT inhaler; Inhale 2 puffs into the lungs 4 times daily as needed for Wheezing  Dispense: 54 g; Refill: 1        I affirm this is a Patient Initiated Episode with a Patient who has not had a related appointment within my department in the past 7 days or scheduled within the next 24 hours. Patient identification was verified at the start of the visit: Yes    Total Time: minutes: 21-30 minutes    The visit was conducted pursuant to the emergency declaration under the [address] [place] [address], [address] [place] [address] authority and the [employer] General Act. Patient identification was verified, and a caregiver was present when appropriate. The patient was located in a state where the provider was credentialed to provide care.     Note: not billable if this call serves to triage the patient into an appointment for the relevant concern      Larry Sanchez MD

## 2021-11-03 DIAGNOSIS — Z20.822 SUSPECTED COVID-19 VIRUS INFECTION: ICD-10-CM

## 2021-11-04 LAB
SARS-COV-2: ABNORMAL
SARS-COV-2: DETECTED
SOURCE: ABNORMAL

## 2021-11-08 DIAGNOSIS — U07.1 COVID: ICD-10-CM

## 2021-11-09 ENCOUNTER — HOSPITAL ENCOUNTER (OUTPATIENT)
Dept: INFUSION THERAPY | Age: 71
Discharge: HOME | End: 2021-11-09
Attending: STUDENT IN AN ORGANIZED HEALTH CARE EDUCATION/TRAINING PROGRAM | Admitting: STUDENT IN AN ORGANIZED HEALTH CARE EDUCATION/TRAINING PROGRAM
Payer: MEDICARE

## 2021-11-09 VITALS
TEMPERATURE: 97 F | DIASTOLIC BLOOD PRESSURE: 87 MMHG | RESPIRATION RATE: 16 BRPM | SYSTOLIC BLOOD PRESSURE: 193 MMHG | OXYGEN SATURATION: 98 % | HEART RATE: 91 BPM

## 2021-11-09 DIAGNOSIS — U07.1 COVID: ICD-10-CM

## 2021-11-09 DIAGNOSIS — U07.1 COVID-19: Primary | ICD-10-CM

## 2021-11-09 PROCEDURE — 2580000003 HC RX 258: Performed by: FAMILY MEDICINE

## 2021-11-09 PROCEDURE — 96365 THER/PROPH/DIAG IV INF INIT: CPT

## 2021-11-09 PROCEDURE — 6360000002 HC RX W HCPCS: Performed by: FAMILY MEDICINE

## 2021-11-09 RX ADMIN — CASIRIVIMAB AND IMDEVIMAB: 600; 600 INJECTION, SOLUTION, CONCENTRATE INTRAVENOUS at 10:58

## 2021-11-09 NOTE — PROGRESS NOTES
Patient completes covid antibody infusion without any issues or s/s of adverse reactions noted. Denies complaints  Pulse oximeter and information given for home use and monitoring     Discharge discussed, verbalizes understanding.   Ambulatory for discharge

## 2021-11-09 NOTE — FLOWSHEET NOTE
Pt ambulatory to room 10. Tested positive Thursday Nov 4th, with symptoms of dizziness, stiffiness, weakness, cough. Procedure explained to pt and pt verbalized understanding.

## 2021-11-15 ENCOUNTER — TELEPHONE (OUTPATIENT)
Dept: FAMILY MEDICINE CLINIC | Age: 71
End: 2021-11-15

## 2021-11-15 NOTE — TELEPHONE ENCOUNTER
It has been 2 weeks since she had covid , sore throat usually resolves within few days. She can try tylenol, claritin to see if that helps, if not then she needs to checked for strep throat.

## 2021-11-26 DIAGNOSIS — I10 ESSENTIAL HYPERTENSION: ICD-10-CM

## 2021-11-26 RX ORDER — ASPIRIN 81 MG/1
TABLET, DELAYED RELEASE ORAL
Qty: 90 TABLET | Refills: 0 | Status: SHIPPED | OUTPATIENT
Start: 2021-11-26 | End: 2022-03-01

## 2021-12-13 NOTE — TELEPHONE ENCOUNTER
Reason for Disposition   MODERATE back pain (e.g., interferes with normal activities) and present > 3 days    Answer Assessment - Initial Assessment Questions  1. ONSET: \"When did the pain begin? \"       Last Friday I was sitting on the couch and I stood up and got a really bad pain in lower back. I had someone help me to bed. It hurt to walk, I had to shuffle my feet. That bad pain went away and now I have a nagging, achy, dull pain in lower back. Then I got pain right under my right rib cage and the rib cage pain is also still present. 2. LOCATION: \"Where does it hurt? \" (upper, mid or lower back)      Lower back and under right rib cage    3. SEVERITY: \"How bad is the pain? \"  (e.g., Scale 1-10; mild, moderate, or severe)    - MILD (1-3): doesn't interfere with normal activities     - MODERATE (4-7): interferes with normal activities or awakens from sleep     - SEVERE (8-10): excruciating pain, unable to do any normal activities       7-8/10    4. PATTERN: \"Is the pain constant? \" (e.g., yes, no; constant, intermittent)       Constant    5. RADIATION: \"Does the pain shoot into your legs or elsewhere? \"      Denies    6. CAUSE:  \"What do you think is causing the back pain? \"       I don't know, unless it has something to do with the back surgery I had in 2006    7. BACK OVERUSE:  Cecilia Kathleen recent lifting of heavy objects, strenuous work or exercise? \"      denes    8. MEDICATIONS: \"What have you taken so far for the pain? \" (e.g., nothing, acetaminophen, NSAIDS)      Tylenol Arthritis  And heating pad    9. NEUROLOGIC SYMPTOMS: \"Do you have any weakness, numbness, or problems with bowel/bladder control? \"      Denies    10. OTHER SYMPTOMS: \"Do you have any other symptoms? \" (e.g., fever, abdominal pain, burning with urination, blood in urine)        Denies    11. PREGNANCY: \"Is there any chance you are pregnant? \" (e.g., yes, no; LMP)        no    Protocols used: BACK PAIN-ADULT-OH    Received call from Lisa at pre-service center [facility] with [employer]. Brief description of triage: lower back pain with pain also under right rib cage. Triage indicates for patient to within 3 days    Care advice provided, patient verbalizes understanding; denies any other questions or concerns; instructed to call back for any new or worsening symptoms. Writer provided warm transfer to [facility] for appointment scheduling. Attention Provider: Thank you for allowing me to participate in the care of your patient. The patient was connected to triage in response to information provided to the [facility]. Please do not respond through this encounter as the response is not directed to a shared pool. [phone]

## 2022-02-25 ENCOUNTER — TELEPHONE (OUTPATIENT)
Dept: PHARMACY | Facility: CLINIC | Age: 72
End: 2022-02-25

## 2022-02-25 NOTE — TELEPHONE ENCOUNTER
[facility] CLINICAL PHARMACY: ADHERENCE REVIEW  Identified care gap per [employer]: fills at [facility]: ACE/ARB adherence    Last Visit: 11/02/21    Patient identified as LIS = 1, therefore patient may be able to receive a 90-day supply for the same cost as a 30-day supply    Patient also appears to be prescribed: LISINOPRIL   TAB 10MG     Patient not found in Outcomes MTM    ASSESSMENT  ACE/ARB ADHERENCE    Per evoke  Portal:  LISINOPRIL   TAB 10MG last filled on 08/28/21 for 90 day supply. BP Readings from Last 3 Encounters:   11/09/21 (!) 193/87   09/09/21 126/88   07/30/21 132/80     CrCl cannot be calculated (Patient's most recent lab result is older than the maximum 120 days allowed. ). STATIN ADHERENCE    Per evoke  Portal:  Atorvastatin last filled on 12/28/21 for 30 day supply. Lab Results   Component Value Date    CHOL 204 (H) 05/12/2021    TRIG 122 05/12/2021    HDL 47 05/12/2021    LDLCHOLESTEROL 133 (H) 05/12/2021     ALT   Date Value Ref Range Status   05/12/2021 10 5 - 33 U/L Final     AST   Date Value Ref Range Status   05/12/2021 14 <32 U/L Final     The 10-year ASCVD risk score (Perla Giraldo, et al., 2013) is: 49%    Values used to calculate the score:      Age: 66 years      Sex: Female      Is Non- : No      Diabetic: No      Tobacco smoker: Yes      Systolic Blood Pressure: 687 mmHg      Is BP treated: Yes      HDL Cholesterol: 47 mg/dL      Total Cholesterol: 204 mg/dL     PLAN  The following are interventions that have been identified:   - Patient overdue refilling LISINOPRIL   TAB 10MG and active on home medication list.     Attempting to reach patient to review.  Left message asking for return call. No future appointments.     [address] [facility] // Department, toll free [phone], Option 1

## 2022-02-25 NOTE — LETTER
[place]  [address], [address]        Vilma Valdez   [address] [place] [address]           02/28/22     Dear Vilma Valdez,      We tried to reach you recently regarding your Lisinopril 10mg, but were unable to reach you on the telephone. We have on file that you are currently taking Lisinopril. If you are no longer taking or taking differently, please call us at the number below so that we can discuss this and update your medication profile.         Sincerely,   [address] // Department, toll free [phone], Option 1

## 2022-02-28 NOTE — TELEPHONE ENCOUNTER
2nd attempt to contact this patient regarding the previous message**    CLINICAL PHARMACY: ADHERENCE REVIEW  Patient unavailable at the time of call. Left following message on home TAD: please call back at toll-free [phone] option 1 to retrieve previous message. Letter mailed to patient.     Sincerely,   [address]  // Department, toll free [phone], Option 1      For Pharmacy Admin Tracking Only     CPA in place:  No   Intervention Detail: Adherence Monitorin   Time Spent (min): 10

## 2022-03-01 DIAGNOSIS — J30.89 NON-SEASONAL ALLERGIC RHINITIS DUE TO OTHER ALLERGIC TRIGGER: ICD-10-CM

## 2022-03-01 DIAGNOSIS — I10 ESSENTIAL HYPERTENSION: ICD-10-CM

## 2022-03-01 RX ORDER — ASPIRIN 81 MG/1
TABLET, DELAYED RELEASE ORAL
Qty: 90 TABLET | Refills: 0 | Status: SHIPPED | OUTPATIENT
Start: 2022-03-01

## 2022-03-01 RX ORDER — LORATADINE 10 MG/1
TABLET ORAL
Qty: 90 TABLET | Refills: 0 | Status: SHIPPED | OUTPATIENT
Start: 2022-03-01 | End: 2022-03-07

## 2022-03-05 DIAGNOSIS — J30.89 NON-SEASONAL ALLERGIC RHINITIS DUE TO OTHER ALLERGIC TRIGGER: ICD-10-CM

## 2022-03-07 RX ORDER — LORATADINE 10 MG/1
TABLET ORAL
Qty: 90 TABLET | Refills: 0 | Status: SHIPPED | OUTPATIENT
Start: 2022-03-07 | End: 2022-09-29

## 2022-03-07 NOTE — TELEPHONE ENCOUNTER
Patient returned call after receiving letter to home. Patient didn't realize she hasn't been taking until she got the letter. States since she got CV-19 her memory has been terrible. Patient states no refill on lisinopril 10mg and also on risedronate 150mg and would like 90DS on both. Expressed her gratitude for checking on her medications      Will route to PharmD to facilitate refill auth.

## 2022-03-07 NOTE — TELEPHONE ENCOUNTER
Please Approve or Refuse.   Send to Pharmacy per Pt's Request: Lurline Mcburney     Next Visit Date:  Visit date not found   Last Visit Date: 11/2/2021    Hemoglobin A1C (%)   Date Value   12/19/2016 5.3             ( goal A1C is < 7)   BP Readings from Last 3 Encounters:   11/09/21 (!) 193/87   09/09/21 126/88   07/30/21 132/80          (goal 120/80)  BUN   Date Value Ref Range Status   07/14/2021 9 8 - 23 mg/dL Final     CREATININE   Date Value Ref Range Status   07/14/2021 0.51 0.50 - 0.90 mg/dL Final     Potassium   Date Value Ref Range Status   07/14/2021 4.1 3.7 - 5.3 mmol/L Final

## 2022-03-08 DIAGNOSIS — I10 ESSENTIAL HYPERTENSION: ICD-10-CM

## 2022-03-08 DIAGNOSIS — E78.2 MIXED HYPERLIPIDEMIA: ICD-10-CM

## 2022-03-08 DIAGNOSIS — M81.0 AGE-RELATED OSTEOPOROSIS WITHOUT CURRENT PATHOLOGICAL FRACTURE: ICD-10-CM

## 2022-03-08 RX ORDER — ATORVASTATIN CALCIUM 20 MG/1
20 TABLET, FILM COATED ORAL DAILY
Qty: 90 TABLET | Refills: 1 | Status: SHIPPED | OUTPATIENT
Start: 2022-03-08 | End: 2022-10-19 | Stop reason: SDUPTHER

## 2022-03-08 RX ORDER — RISEDRONATE SODIUM 150 MG/1
1 TABLET, FILM COATED ORAL
Qty: 3 TABLET | Refills: 1 | Status: SHIPPED | OUTPATIENT
Start: 2022-03-08 | End: 2022-11-03

## 2022-03-08 RX ORDER — LISINOPRIL 10 MG/1
10 TABLET ORAL DAILY
Qty: 90 TABLET | Refills: 1 | Status: SHIPPED | OUTPATIENT
Start: 2022-03-08 | End: 2022-06-09

## 2022-03-08 NOTE — TELEPHONE ENCOUNTER
Christiane Kendrick MD, patient out of refills.  Rx pended for your signature/modification as appropriate    LOV: 11/2/21  Next: to be scheduled    Thank you,  Tian Anna, PharmD, [facility]  Department, toll free: [phone], option 1

## 2022-03-17 NOTE — TELEPHONE ENCOUNTER
Both medications were approved on 22 with 1 refill     For Chance Aquino in place:  No   Recommendation Provided To: Provider: 2 via Note to Provider and Patient/Caregiver: 2 via Telephone   Intervention Detail: Adherence Monitorin, New Rx: 2, reason: Improve Adherence and Refill(s) Provided   Gap Closed?: Yes    Intervention Accepted By: Patient/Caregiver: 2   Time Spent (min): 20

## 2022-04-04 ENCOUNTER — TELEPHONE (OUTPATIENT)
Dept: FAMILY MEDICINE CLINIC | Age: 72
End: 2022-04-04

## 2022-04-06 ENCOUNTER — TELEPHONE (OUTPATIENT)
Dept: FAMILY MEDICINE CLINIC | Age: 72
End: 2022-04-06

## 2022-04-06 DIAGNOSIS — M79.671 PAIN IN BOTH FEET: Primary | ICD-10-CM

## 2022-04-06 DIAGNOSIS — M79.672 PAIN IN BOTH FEET: Primary | ICD-10-CM

## 2022-04-06 NOTE — TELEPHONE ENCOUNTER
----- Message from Manju Norris sent at 4/6/2022 11:23 AM EDT -----  Subject: Referral Request    QUESTIONS   Reason for referral request? patient needs a referral [place] podiatry [place] the   [place] side [facility] sent by provider Tiffany Gooden   Has the physician seen you for this condition before? No   Preferred Specialist (if applicable)? Do you already have an appointment scheduled? No  Additional Information for Provider?   ---------------------------------------------------------------------------  --------------  CALL BACK INFO  What is the best way for the office to contact you? OK to leave message on   voicemail  Preferred Call Back Phone Number? [phone]  ---------------------------------------------------------------------------  --------------  SCRIPT ANSWERS  Relationship to Patient?  Self

## 2022-04-06 NOTE — TELEPHONE ENCOUNTER
Referral done1.  Pain in both feet    - [address] [facility], [id number], [facility], [facility]

## 2022-04-07 ENCOUNTER — OFFICE VISIT (OUTPATIENT)
Dept: PODIATRY | Age: 72
End: 2022-04-07
Payer: MEDICARE

## 2022-04-07 VITALS — WEIGHT: 145 LBS | BODY MASS INDEX: 26.68 KG/M2 | HEIGHT: 62 IN

## 2022-04-07 DIAGNOSIS — M79.671 PAIN IN RIGHT FOOT: ICD-10-CM

## 2022-04-07 DIAGNOSIS — S93.601A SPRAIN OF RIGHT FOOT, INITIAL ENCOUNTER: Primary | ICD-10-CM

## 2022-04-07 DIAGNOSIS — R60.0 EDEMA, LOWER EXTREMITY: ICD-10-CM

## 2022-04-07 PROCEDURE — 99203 OFFICE O/P NEW LOW 30 MIN: CPT | Performed by: PODIATRIST

## 2022-04-07 PROCEDURE — L4360 PNEUMAT WALKING BOOT PRE CST: HCPCS | Performed by: PODIATRIST

## 2022-04-07 ASSESSMENT — ENCOUNTER SYMPTOMS
COLOR CHANGE: 0
BACK PAIN: 0
NAUSEA: 0
SHORTNESS OF BREATH: 0
DIARRHEA: 0

## 2022-04-07 NOTE — PROGRESS NOTES
Juan M Cohen is a 66 y.o. female who presents to the office today with chief complaint of injury to the right foot. Chief Complaint   Patient presents with    Foot Injury     patient fell injuring right foot about 4 weeks ago   Symptoms began about 4 week(s) ago. Patient complains of injury to the right foot. Patient states that she fell at home and injured the right foot. Pain is rated 6 out of 10 at it's worst and is described as intermittent. Treatments prior to today's visit include: Patient at first thought she just sprained the foot, so she didn't seek treatment. However, she states that her pain hasn't improved so she had xrays taken. Patient was told that she has a stress fracture to a tarsal bone. Patient was referred to my office for care. Allergies   Allergen Reactions    Influenza Vaccines      Body aches skin pain     Bee Venom Other (See Comments)     numbness    Other Other (See Comments)     Spider bites-red streaks down leg-hospitalized with last bite    Penicillins Hives       Past Medical History:   Diagnosis Date    Arthritis     spine    COVID 11/8/2021    Gallstones     Hearing loss     Hx of bronchitis     Hyperlipidemia     Hypertension     Seasonal allergies     Spondylolisthesis at L5-S1 level     s/p cage and fusion    Wellness examination 07/14/2021    pcp-Dr Edilson Scherer-last visit june 2021       Prior to Admission medications    Medication Sig Start Date End Date Taking?  Authorizing Provider   lisinopril (PRINIVIL;ZESTRIL) 10 MG tablet Take 1 tablet by mouth daily 3/8/22  Yes Denisse Gaona MD   Risedronate Sodium 150 MG TABS Take 1 tablet by mouth every 30 days 3/8/22  Yes Denisse Gaona MD   atorvastatin (LIPITOR) 20 MG tablet Take 1 tablet by mouth daily 3/8/22  Yes Denisse Gaona MD   loratadine (CLARITIN) 10 MG tablet TAKE 1 TABLET BY MOUTH EVERY DAY 3/7/22  Yes Denisse Gaona MD   fluticasone (FLONASE) 50 MCG/ACT nasal spray 2 sprays by Nasal route daily 11/2/21  Yes Shelva Cabot, MD   albuterol sulfate  (90 Base) MCG/ACT inhaler Inhale 2 puffs into the lungs 4 times daily as needed for Wheezing 11/2/21  Yes Shelva Cabot, MD   MYRBETRIQ 50 MG TB24 TAKE 1 TABLET BY MOUTH ONE TIME A DAY  8/30/21  Yes Shelva Cabot, MD   SM ASPIRIN ADULT LOW STRENGTH 81 MG EC tablet TAKE 1 TABLET BY MOUTH EVERY DAY 3/1/22   Shelva Cabot, MD   ibuprofen (ADVIL;MOTRIN) 800 MG tablet Take 1 tablet by mouth every 6 hours as needed for Pain 11/2/21   Shelva Cabot, MD   tiZANidine (ZANAFLEX) 4 MG tablet Take 1 tablet by mouth 3 times daily  Patient not taking: Reported on 11/2/2021 9/9/21   Shelva Cabot, MD       Past Surgical History:   Procedure Laterality Date    CHOLECYSTECTOMY, LAPAROSCOPIC  07/21/2021    XI ROBOTIC LAPAROSCOPIC CHOLECYSTECTOMY    CHOLECYSTECTOMY, LAPAROSCOPIC N/A 7/21/2021    XI ROBOTIC LAPAROSCOPIC CHOLECYSTECTOMY, performed by Samm Rojas DO at [address] COLONOSCOPY N/A 11/21/2020    COLONOSCOPY POLYPECTOMY SNARE/COLD BIOPSY, HOT BIOPSY SNARE AND PHOTOS performed by Jac Jacob MD at [address]       L5-S1 fusion with cage    NASAL SINUS SURGERY      WISDOM TOOTH EXTRACTION         Family History   Problem Relation Age of Onset    Heart Attack Maternal Grandmother     Diabetes Father     Stroke Father     Dementia Mother        Social History     Tobacco Use    Smoking status: Current Every Day Smoker     Packs/day: 1.00     Years: 35.00     Pack years: 35.00     Types: Cigarettes    Smokeless tobacco: Never Used   Substance Use Topics    Alcohol use: No       Review of Systems   Constitutional: Negative for activity change, appetite change, chills, diaphoresis, fatigue and fever. Respiratory: Negative for shortness of breath. Cardiovascular: Negative for leg swelling. Gastrointestinal: Negative for diarrhea and nausea.    Endocrine: Negative for cold intolerance, heat intolerance and polyuria. Musculoskeletal: Positive for arthralgias. Negative for back pain, gait problem, joint swelling and myalgias. Skin: Negative for color change, pallor, rash and wound. Allergic/Immunologic: Negative for environmental allergies and food allergies. Neurological: Negative for dizziness, weakness, light-headedness and numbness. Hematological: Does not bruise/bleed easily. Psychiatric/Behavioral: Negative for behavioral problems, confusion and self-injury. The patient is not nervous/anxious. Vitals: There were no vitals filed for this visit. General: AAO x 3 in NAD. Integument: There are no rashes, ulcers, or breaks in the skin noted to the bilateral lower extremities. There is no induration, subcutaneous nodules, or tightening of the skin noted to the bilateral.     Toenails 1-5 of the right foot do not present with thickness, elongation, discoloration, brittleness, subungual debris. Toenails 1-5 of the left foot do not present with thickness, elongation, discoloration, brittleness, subungual debris. Interdigital maceration absent to web spaces 1-4, Bilateral.     There are no preulcerative lesions noted to the right foot. There are no preulcerative lesions noted to the left foot. The skin to the bilateral feet is not thin and shiny. The skin to the bilateral feet is  warm, supple, and dry. Vascular: DP pulse of the right foot is  palpable. DP pulse of the left foot is  palpable. PT pulse of the right foot is  palpable. PT pulse of the left foot is  palpable. CFT is less than 3 secs to the digits of the right foot. CFT is less than 3 secs to the digits of the left foot. There is edema noted to the right foot. There is hair growth noted to the digits of the bilateral feet. There are no varicosities noted to the right foot/ankle. There are no varicosities noted to the left foot/ankle.      Erythema is absent to the bilateral feet.    Neurological: Reflexes are present to the right plantar foot and to the Achilles tendon. Reflexes are present to the left plantar foot and to the Achilles tendon. Epicritic sensation is  intact to the right foot. Epicritic sensation is  intact to the left foot. Musculoskeletal:  Muscle strength is +5/5 to all four muscle groups of the right lower extremity and +5/5 to all four muscle groups of the left lower extremity. There is pain with muscle strength evaluation to all four muscle groups of the RLE. There are no areas of subluxation, dislocation, or laxity noted to either lower extremity. Range of motion to the right ankle is  free of pain or grinding. Range of motion to the left ankle is  free of pain or grinding. Range of motion to the right subtalar joint is  free of pain or grinding. Range of motion to the left subtalar joint is  free of pain or grinding. No abnormalities, asymmetries, or misalignments are seen between the extremities. Weightbearing evaluation does not reveal rearfoot eversion, medial prominence of the talar head, loss of the medial longitudinal arch height, and too many toes sign bilaterally. The lesser digits of the right foot are contracted. The lesser digits of the left foot are contracted. There is no prominence noted to the first metatarsal head without abduction of the hallux of the right foot. There is no prominence noted to the first metatarsal head without abduction of the hallux of the left foot. There is pain with palpation to the lesser metatarsals of the right foot just proximal to the MTPJ's. Shoe examination was performed. Biomechanical Exam: abnormal right as the patient limps. X-ray's reviewed from the hospital: AP, Lateral, and Medial Oblique of the right foot. Findings: No fracture or stress fracture is noted. Asessment: Patient is a 66 y.o. female with:    Diagnosis Orders   1.  Sprain of right foot, initial encounter  MO PNEUMAT WALKING BOOT PRE CST   2. Edema, lower extremity  MO PNEUMAT WALKING BOOT PRE CST   3. Pain in right foot  MO PNEUMAT WALKING BOOT PRE CST       Plan:  1. Clinical evaluation of the patient. 2. Patient informed that she has a sprain to the right foot. I have dispensed a short pneumatic equalizer walker to the patient's right lower extremity. Due to the patient's diagnosis and related symptoms this is medically necessary for the treatment. The function of this device is to restrict and limit motion and provide stabilization and compression to the affected area. Specific instructions on weight bearing and ROM exercises were discussed and given to the patient. Patient to wear this boot at all times when weightbearing and is to limit her weightbearing to ADL's only. The goals and function of this device were explained in detail to the patient. Upon dispensing, the device appeared to be fitting well and the patient states that the device is comfortable at this time. The patient was shown how to properly apply, wear, and care for the device. The patient was able to properly apply the device and ambulate with it without distress. At the time that the device was dispensed it was suitable for the patient's condition and was not substandard. No guarantees were given or implied and the precautions of the device were reviewed the patient. Written instructions and warranty information was given along with the list of the twenty-one (21) Durable Medical Equipment Supplier Guidelines. All patient questions were answered satisfactorily. Patient was instructed on proper rest, ice, compression, and elevation of the right lower extremity. 3. Contact office with any questions/problems/concerns. Return in about 4 weeks (around 5/5/2022) for evaluation of sprain to the right foot.    4/7/2022      Farshad Diane DPM

## 2022-05-05 ENCOUNTER — OFFICE VISIT (OUTPATIENT)
Dept: PODIATRY | Age: 72
End: 2022-05-05
Payer: MEDICARE

## 2022-05-05 VITALS — BODY MASS INDEX: 26.68 KG/M2 | HEIGHT: 62 IN | WEIGHT: 145 LBS

## 2022-05-05 DIAGNOSIS — S93.601D SPRAIN OF RIGHT FOOT, SUBSEQUENT ENCOUNTER: Primary | ICD-10-CM

## 2022-05-05 DIAGNOSIS — R60.0 EDEMA, LOWER EXTREMITY: ICD-10-CM

## 2022-05-05 DIAGNOSIS — M79.671 PAIN IN RIGHT FOOT: ICD-10-CM

## 2022-05-05 PROCEDURE — L4350 ANKLE CONTROL ORTHO PRE OTS: HCPCS | Performed by: PODIATRIST

## 2022-05-05 PROCEDURE — 99213 OFFICE O/P EST LOW 20 MIN: CPT | Performed by: PODIATRIST

## 2022-05-05 ASSESSMENT — ENCOUNTER SYMPTOMS
NAUSEA: 0
SHORTNESS OF BREATH: 0
COLOR CHANGE: 0
BACK PAIN: 0
DIARRHEA: 0

## 2022-05-05 NOTE — PROGRESS NOTES
[facility] Podiatry  Return Patient Progress Note    Subjective: Vamshi Agent 66 y.o. female that presents for follow up evaluation of sprain to the right foot. Chief Complaint   Patient presents with    Foot Pain     follow up right foot sprain, doing better     Patient's treatment thus far has included CAM walker, RICE. Pain is rated 2 out of 10 and is described as intermittent. Patient has been following my prescribed course of therapy as instructed. Review of Systems   Constitutional: Negative for activity change, appetite change, chills, diaphoresis, fatigue and fever. Respiratory: Negative for shortness of breath. Cardiovascular: Negative for leg swelling. Gastrointestinal: Negative for diarrhea and nausea. Endocrine: Negative for cold intolerance, heat intolerance and polyuria. Musculoskeletal: Positive for arthralgias. Negative for back pain, gait problem, joint swelling and myalgias. Skin: Negative for color change, pallor, rash and wound. Allergic/Immunologic: Negative for environmental allergies and food allergies. Neurological: Negative for dizziness, weakness, light-headedness and numbness. Hematological: Does not bruise/bleed easily. Psychiatric/Behavioral: Negative for behavioral problems, confusion and self-injury. The patient is not nervous/anxious. Objective: Clinical evaluation of the patient reveals only very slight edema remaining to the right foot and ankle. There is no pain with muscle strength evaluation of the right lower extremity. There is mild remaining pain with palpation to the lesser metatarsals of the right foot just proximal to the MTPJ's. This pain is increased with inversion of the right foot. There is no erythema, calor, or open wound noted to the right foot or ankle. Assessment:    Diagnosis Orders   1. Sprain of right foot, subsequent encounter  SD ANKLE CONTROL ORTHO PRE OTS   2.  Edema, lower extremity  SD ANKLE CONTROL ORTHO PRE OTS 3. Pain in right foot  ID ANKLE CONTROL ORTHO PRE OTS         Plan: 1. Clinical evaluation of the patient. 2. I have dispensed an ankle brace to the patient's right lower extremity. Due to the patient's diagnosis and related symptoms this is medically necessary for the treatment. The function of this device is to restrict and limit motion and provide stabilization and compression to the affected area. Specific instructions on weight bearing and ROM exercises were discussed and given to the patient. Patient to wear this brace at all times when weightbearing and is to resume use of her CAM walker if the brace does not adequately control her pain. The goals and function of this device were explained in detail to the patient. Upon dispensing, the device appeared to be fitting well and the patient states that the device is comfortable at this time. The patient was shown how to properly apply, wear, and care for the device. The patient was able to properly apply the device and ambulate with it without distress. At the time that the device was dispensed it was suitable for the patient's condition and was not substandard. No guarantees were given or implied and the precautions of the device were reviewed the patient. Written instructions and warranty information was given along with the list of the twenty-one (21) Durable Medical Equipment Supplier Guidelines. All patient questions were answered satisfactorily. Patient was instructed to continue with rest, ice, compression, and elevation of the right lower extremity. 3. Return in about 2 weeks (around 5/19/2022) for sprain right foot.    5/5/2022      Kali Stoner DPM

## 2022-05-19 ENCOUNTER — OFFICE VISIT (OUTPATIENT)
Dept: PODIATRY | Age: 72
End: 2022-05-19
Payer: MEDICARE

## 2022-05-19 VITALS — WEIGHT: 145 LBS | BODY MASS INDEX: 26.68 KG/M2 | HEIGHT: 62 IN

## 2022-05-19 DIAGNOSIS — M79.671 PAIN IN RIGHT FOOT: ICD-10-CM

## 2022-05-19 DIAGNOSIS — R60.0 EDEMA, LOWER EXTREMITY: ICD-10-CM

## 2022-05-19 DIAGNOSIS — S93.601D SPRAIN OF RIGHT FOOT, SUBSEQUENT ENCOUNTER: Primary | ICD-10-CM

## 2022-05-19 PROCEDURE — 99213 OFFICE O/P EST LOW 20 MIN: CPT | Performed by: PODIATRIST

## 2022-05-19 ASSESSMENT — ENCOUNTER SYMPTOMS
SHORTNESS OF BREATH: 0
DIARRHEA: 0
COLOR CHANGE: 0
NAUSEA: 0
BACK PAIN: 0

## 2022-05-19 NOTE — PROGRESS NOTES
[facility]  Return Patient Progress Note    Subjective: Lucita Line 66 y.o. female that presents for follow up evaluation of pain to the right ankle. Chief Complaint   Patient presents with    Ankle Pain     right ankle, painful when wearing tennis shoes     Patient's treatment thus far has included ankle brace. Pain is rated 2 out of 10 and is described as intermittent. Patient has been following my prescribed course of therapy as instructed. Patient states that her pain is only present to the front of the ankle and when wearing sneakers. Review of Systems   Constitutional: Negative for activity change, appetite change, chills, diaphoresis, fatigue and fever. Respiratory: Negative for shortness of breath. Cardiovascular: Negative for leg swelling. Gastrointestinal: Negative for diarrhea and nausea. Endocrine: Negative for cold intolerance, heat intolerance and polyuria. Musculoskeletal: Positive for arthralgias. Negative for back pain, gait problem, joint swelling and myalgias. Skin: Negative for color change, pallor, rash and wound. Allergic/Immunologic: Negative for environmental allergies and food allergies. Neurological: Negative for dizziness, weakness, light-headedness and numbness. Hematological: Does not bruise/bleed easily. Psychiatric/Behavioral: Negative for behavioral problems, confusion and self-injury. The patient is not nervous/anxious. Objective: Clinical evaluation of the patient reveals no edema remaining to the right foot or ankle. There is no pain with muscle strength evaluation of the right lower extremity. There is mild pain with palpation to the extensor tendons at the level of the right ankle. There is no pain with palpation to the lesser metatarsals of the right foot. There is mild pain to the right ankle with inversion and plantarflexion of the right foot.  There is no erythema, calor, or open wound noted to the right foot or ankle.    Assessment:    Diagnosis Orders   1. Sprain of right foot, subsequent encounter     2. Edema, lower extremity     3. Pain in right foot           Plan: 1. Clinical evaluation of the patient. 2. Patient may discontinue use of her ankle brace at this time, but is encouraged to resume wearing it if her pain returns. 3. Return if symptoms worsen or fail to improve.    5/19/2022      Farshad Diane DPM

## 2022-06-09 DIAGNOSIS — I10 ESSENTIAL HYPERTENSION: ICD-10-CM

## 2022-06-09 RX ORDER — LISINOPRIL 10 MG/1
TABLET ORAL
Qty: 90 TABLET | Refills: 0 | Status: SHIPPED | OUTPATIENT
Start: 2022-06-09 | End: 2022-09-29 | Stop reason: SDUPTHER

## 2022-06-22 ENCOUNTER — TELEPHONE (OUTPATIENT)
Dept: FAMILY MEDICINE CLINIC | Age: 72
End: 2022-06-22

## 2022-08-11 ENCOUNTER — TELEPHONE (OUTPATIENT)
Dept: PHARMACY | Facility: CLINIC | Age: 72
End: 2022-08-11

## 2022-08-11 NOTE — LETTER
[place]  [address], [address]        Jordy Colon   [address] [address]           08/15/22     Dear Jordy Colon,    We tried to reach you recently regarding your Atorvastatin, but were unable to reach you on the telephone. We have on file that you are currently taking Atorvastatin. If you are no longer taking or taking differently, please call us at the number below so that we can discuss this and update your medication profile. It appears that this medication has not been filled at proper times. We are worried you might be missing doses or not taking it as directed. It is important that you take your medications regularly and try not to miss a single dose. Some ways to help you remember to take and refill your medications are to:  · Use a pill box, set an alarm, and/or keep your medication near something that you do every day  · Fill a 3-month supply of your prescription at a time to save you time and trips to the pharmacy - if you would like assistance in switching your prescriptions to a 3-month supply, please contact us.    · Ask your pharmacy if they participate in [facility]", a program where you can  all of your medications on the same day  · Ask your pharmacy if you can be set up with automatic refill, where they will automatically refill your prescription when it is due and let you know it's ready to     Sincerely,   Cas [address]   Phone: toll free [phone], option #1

## 2022-08-11 NOTE — TELEPHONE ENCOUNTER
[facility] CLINICAL PHARMACY: ADHERENCE REVIEW  Identified care gap per [employer]: fills at [facility]: ACE/ARB and Statin adherence    Last Visit: 11/2/21      Patient not found in [facility] [place]    [address] Records claims through 7/17/22 ; YTD [place] =  100%; Potential Fail Date: 11/2/22 ):   LISINOPRIL TAB 10MG due to refill 9/4/22 for 90 day supply. Per [address]:   LISINOPRIL TAB 10MG last picked up on 7/24/22 for 90 day supply. 0 refills remaining. Billed through [employer]     BP Readings from Last 3 Encounters:   11/09/21 (!) 193/87   09/09/21 126/88   07/30/21 132/80     CrCl cannot be calculated (Patient's most recent lab result is older than the maximum 180 days allowed. ). [address] Records claims through 7/17/22 ; YTD [place] =  85%; Potential Fail Date: 8/26/22 ):   ATORVASTATIN TAB 20MG due to refill 7/3/22 for 90 day supply. Per [address]:   ATORVASTATIN TAB 20MG last picked up on 4/7/22 for 90 day supply. 0 refills remaining. Billed through [facility]. Pharmacy filled over the phone for 90 d/s and awaiting patient . Lab Results   Component Value Date    CHOL 204 (H) 05/12/2021    TRIG 122 05/12/2021    HDL 47 05/12/2021    LDLCHOLESTEROL 133 (H) 05/12/2021     ALT   Date Value Ref Range Status   05/12/2021 10 5 - 33 U/L Final     AST   Date Value Ref Range Status   05/12/2021 14 <32 U/L Final     The 10-year ASCVD risk score (Jeralene Brunner., et al., 2013) is: 49%    Values used to calculate the score:      Age: 66 years      Sex: Female      Is Non- : No      Diabetic: No      Tobacco smoker: Yes      Systolic Blood Pressure: 037 mmHg      Is BP treated: Yes      HDL Cholesterol: 47 mg/dL      Total Cholesterol: 204 mg/dL     PLAN  The following are interventions that have been identified:   - Patient overdue refilling Atorvastatin and active on home medication list.     Attempting to reach patient to review. Left message asking for return call. Need to remind patient to  Atorvastatin from pharmacy. No future appointments.     Leigh Ann Mehta[address]   Direct: [phone]  Phone: toll free [phone]

## 2022-08-15 ENCOUNTER — HOSPITAL ENCOUNTER (EMERGENCY)
Age: 72
Discharge: HOME | End: 2022-08-15
Attending: EMERGENCY MEDICINE
Payer: MEDICARE

## 2022-08-15 VITALS
TEMPERATURE: 97.9 F | DIASTOLIC BLOOD PRESSURE: 72 MMHG | HEART RATE: 80 BPM | OXYGEN SATURATION: 98 % | SYSTOLIC BLOOD PRESSURE: 153 MMHG | RESPIRATION RATE: 16 BRPM

## 2022-08-15 DIAGNOSIS — T63.441A BEE STING, ACCIDENTAL OR UNINTENTIONAL, INITIAL ENCOUNTER: Primary | ICD-10-CM

## 2022-08-15 PROCEDURE — 99284 EMERGENCY DEPT VISIT MOD MDM: CPT

## 2022-08-15 PROCEDURE — 96372 THER/PROPH/DIAG INJ SC/IM: CPT

## 2022-08-15 PROCEDURE — 6360000002 HC RX W HCPCS: Performed by: PHYSICIAN ASSISTANT

## 2022-08-15 RX ORDER — DEXAMETHASONE SODIUM PHOSPHATE 10 MG/ML
8 INJECTION, SOLUTION INTRAMUSCULAR; INTRAVENOUS ONCE
Status: COMPLETED | OUTPATIENT
Start: 2022-08-15 | End: 2022-08-15

## 2022-08-15 RX ADMIN — DEXAMETHASONE SODIUM PHOSPHATE 8 MG: 10 INJECTION, SOLUTION INTRAMUSCULAR; INTRAVENOUS at 13:49

## 2022-08-15 NOTE — TELEPHONE ENCOUNTER
Return call from patient regarding message below. Admits to forgetting statin regularly. Sometimes takes at night, sometimes morning, sometimes Noon. Discussed different strategies to remember. Has a few tablets remaining but states she will still  refill.

## 2022-08-15 NOTE — ED PROVIDER NOTES
[address]  eMERGENCY dEPARTMENT eNCOUnter      Pt Name: Abebe Herzog  MRN: [id number]  [place] 1950  Date of evaluation: 8/15/2022  Provider: Dayanara Smith PA-C    CHIEF COMPLAINT       Chief Complaint   Patient presents with    Insect Bite           HISTORY OF PRESENT ILLNESS  (Location/Symptom, Timing/Onset, Context/Setting, Quality, Duration, Modifying Factors, Severity.)   Abebe Herzog is a 66 y.o. female who presents to the emergency department c/o bee sting to top of right foot. Pt states she was stung around 30-45 min ago. Pt reports when she does get stung she will get swelling, pain, and numbness. She has never had angioedema or anaphylaxis from bee sting. Denies cp, sob, cough, throat or facial swelling. Pt has not tried anything for the sting. Came right to the Ed. She is not diabetic. No other complaints. Nursing Notes were reviewed. REVIEW OF SYSTEMS    (2-9 systems for level 4, 10 or more for level 5)     Review of Systems   C/o rash  Denies trouble breathing  Denies cp  Denies fevers. Except as noted above the remainder of the review of systems was reviewed and negative.        PAST MEDICAL HISTORY     Past Medical History:   Diagnosis Date    Arthritis     spine    COVID 2021    Gallstones     Hearing loss     Hx of bronchitis     Hyperlipidemia     Hypertension     Seasonal allergies     Spondylolisthesis at L5-S1 level     s/p cage and fusion    Wellness examination 2021    pcp-Dr Luis Scherer-last visit 2021     None otherwise stated in nurses notes    SURGICAL HISTORY       Past Surgical History:   Procedure Laterality Date    CHOLECYSTECTOMY, LAPAROSCOPIC  2021    XI ROBOTIC LAPAROSCOPIC CHOLECYSTECTOMY    CHOLECYSTECTOMY, LAPAROSCOPIC N/A 2021    XI ROBOTIC LAPAROSCOPIC CHOLECYSTECTOMY, performed by Lupe Lee DO at [address] [facility] N/A 2020    COLONOSCOPY POLYPECTOMY SNARE/COLD BIOPSY, HOT BIOPSY SNARE AND PHOTOS performed by Isela Cornell MD at [facility]. (CERVIX STATUS UNKNOWN)      LUMBAR FUSION       L5-S1 fusion with cage    NASAL SINUS SURGERY      WISDOM TOOTH EXTRACTION       None otherwise stated in nurses notes    CURRENT MEDICATIONS       Previous Medications    ALBUTEROL SULFATE  (90 BASE) MCG/ACT INHALER    Inhale 2 puffs into the lungs 4 times daily as needed for Wheezing    ATORVASTATIN (LIPITOR) 20 MG TABLET    Take 1 tablet by mouth daily    FLUTICASONE (FLONASE) 50 MCG/ACT NASAL SPRAY    2 sprays by Nasal route daily    IBUPROFEN (ADVIL;MOTRIN) 800 MG TABLET    Take 1 tablet by mouth every 6 hours as needed for Pain    LISINOPRIL (PRINIVIL;ZESTRIL) 10 MG TABLET    TAKE 1 TABLET BY MOUTH EVERY DAY    LORATADINE (CLARITIN) 10 MG TABLET    TAKE 1 TABLET BY MOUTH EVERY DAY    MYRBETRIQ 50 MG TB24    TAKE 1 TABLET BY MOUTH ONE TIME A DAY     RISEDRONATE SODIUM 150 MG TABS    Take 1 tablet by mouth every 30 days    SM ASPIRIN ADULT LOW STRENGTH 81 MG EC TABLET    TAKE 1 TABLET BY MOUTH EVERY DAY    TIZANIDINE (ZANAFLEX) 4 MG TABLET    Take 1 tablet by mouth 3 times daily       ALLERGIES     Influenza vaccines, Bee venom, Other, and Penicillins    FAMILY HISTORY           Problem Relation Age of Onset    Heart Attack Maternal Grandmother     Diabetes Father     Stroke Father     Dementia Mother      Family Status   Relation Name Status    MGM  (Not Specified)    Father  (Not Specified)    Mother        None otherwise stated in nurses notes    SOCIAL HISTORY      reports that she has been smoking cigarettes. She has a 35.00 pack-year smoking history. She has never used smokeless tobacco. She reports that she does not drink alcohol and does not use drugs.    lives at home with others     PHYSICAL EXAM    (up to 7 for level 4, 8 or more for level 5)     ED Triage Vitals [08/15/22 1329]   BP Temp Temp Source Heart Rate Resp SpO2 Height Weight   (!) 153/72 97.9 °F (36.6 °C) Temporal 80 16 98 % -- --       Physical Exam   Nursing note and vitals reviewed. Constitutional: Oriented to person, place, and time and well-developed, well-nourished. Head: Normocephalic and atraumatic. Ear: External ears normal.   Nose: Nose normal and midline. Eyes: Conjunctivae and EOM are normal. Pupils are equal, round, and reactive to light. Neck: Normal range of motion. Throat: Posterior pharynx is without erythema or exudates, airway is patent, no swelling  Cardiovascular: Normal rate, regular rhythm, normal heart sounds and intact distal pulses. Pulmonary/Chest: Effort normal and breath sounds normal. No respiratory distress. No wheezes. No rales. No chest tenderness. Musculoskeletal: Normal range of motion. Neurological: Alert and oriented to person, place, and time. GCS score is 15. Skin: small area of erythema to the dorsal aspect of right foot. Minimal pain. FROM of foot and ankle. 2/2 dp and pt pulses. Brisk cap refill. Distal sensation intact. Ambulatory. Psychiatric: Mood, memory, affect and judgment normal.           DIAGNOSTIC RESULTS     EKG: All EKG's are interpreted by the Emergency Department Physician who either signs or Co-signs this chart in the absence of a cardiologist.        RADIOLOGY:   All plain film, CT, MRI, and formal ultrasound images (except ED bedside ultrasound) are read by the radiologist and the images and interpretations are directly viewed by the emergency physician. No orders to display       No results found. LABS:  Labs Reviewed - No data to display    All other labs were within normal range or not returned as of this dictation. EMERGENCY DEPARTMENT COURSE and DIFFERENTIAL DIAGNOSIS/MDM:   Vitals:    Vitals:    08/15/22 1329   BP: (!) 153/72   Pulse: 80   Resp: 16   Temp: 97.9 °F (36.6 °C)   TempSrc: Temporal   SpO2: 98%       Bee sting to the right foot. Occurred PTA. Pt states she will get swelling, redness, and numbness.   No hx of anaphylaxis. Pt given toradol in the ED. Advised to apply ice and take benadryl. Follow up with PCP. Discussed results and plan with the pt. They expressed appropriate understanding. Pt given close follow up, supportive care instructions and strict return instructions at the bedside. Patient instructed to return to the emergency room if symptoms worsen, return, or any other concern right away which is agreed by the patient    ED MEDS:  Orders Placed This Encounter   Medications    dexamethasone (PF) (DECADRON) injection 8 mg         CONSULTS:  None    PROCEDURES:  None      FINAL IMPRESSION      1.  Bee sting, accidental or unintentional, initial encounter          DISPOSITION/PLAN   DISPOSITION Decision To Discharge    PATIENT REFERRED TO:  Butch Briscoe MD  [address]  [address] [facility] [address] [address] [place] [id number] [phone]        DISCHARGE MEDICATIONS:  New Prescriptions    No medications on file       (Please note that portions of this note were completed with a voice recognition program.  Efforts were made to edit the dictations but occasionally words are mis-transcribed.)    Geovany Guerrero. Brigitte James PA-C  08/15/22 [id number]

## 2022-08-15 NOTE — DISCHARGE INSTRUCTIONS
Apply ice. Return to the ed if you develop chest pain, shortness of breath, facial swelling, throat swelling or difficult breathing.

## 2022-08-15 NOTE — TELEPHONE ENCOUNTER
Called pharmacy and confirmed Atorvastatin still awaiting . Called patient and left another VM. Sending letter.        [facility] , [address]   Phone: [phone]       For Pharmacy [address] [facility] [address] in place:  No  Recommendation Provided To: Patient/Caregiver: 1 via Telephone and Pharmacy: 1  Intervention Detail: Adherence Monitorin  Gap Closed?: Yes   Intervention Accepted By: Patient/Caregiver: 1 and Pharmacy: 1  Time Spent (min): 15

## 2022-08-16 NOTE — ED PROVIDER NOTES
eMERGENCY dEPARTMENT eNCOUnter   Independent Attestation     Pt Name: Mitesh Kennedy  MRN: [id number]  [place] 1950  Date of evaluation: 8/16/22     Mitesh Kennedy is a 66 y.o. female with CC: Insect Bite        This visit was performed by both a physician and an APC. I performed all aspects of the MDM as documented. The care is provided during an unprecedented national emergency due to the novel coronavirus, COVID 19.     Lorenzo Mallory MD  Attending Emergency Physician            Lorenzo Mallory MD  08/16/22 [id number]

## 2022-08-17 ENCOUNTER — TELEPHONE (OUTPATIENT)
Dept: FAMILY MEDICINE CLINIC | Age: 72
End: 2022-08-17

## 2022-08-17 NOTE — TELEPHONE ENCOUNTER
[facility]) ED Follow up Call    Reason for ED visit:   Bee sting, accidental or unintentional, initial encounter      8/17/2022     Bautista Love , this is heriberto from Dr. Michel Titus office, just calling to see how you are doing after your recent ED visit. Did you receive discharge instructions? Yes  Do you understand the discharge instructions? Yes  Did the ED give you any new prescriptions? Yes  Were you able to fill your prescriptions? Yes      Do you have one of our red, yellow and green  Zone sheets that help you to determine when you should go to the ED? No      Do you need or want to make a follow up appt with your PCP? No    Do you have any further needs in the home, e.g. equipment? No        FU appts/Provider:    No future appointments.

## 2022-08-31 ENCOUNTER — TELEPHONE (OUTPATIENT)
Dept: FAMILY MEDICINE CLINIC | Age: 72
End: 2022-08-31

## 2022-08-31 ENCOUNTER — TELEMEDICINE (OUTPATIENT)
Dept: FAMILY MEDICINE CLINIC | Age: 72
End: 2022-08-31
Payer: MEDICARE

## 2022-08-31 DIAGNOSIS — Z87.891 PERSONAL HISTORY OF TOBACCO USE: ICD-10-CM

## 2022-08-31 DIAGNOSIS — J40 BRONCHITIS: Primary | ICD-10-CM

## 2022-08-31 PROCEDURE — 99443 PR PHYS/QHP TELEPHONE EVALUATION 21-30 MIN: CPT | Performed by: FAMILY MEDICINE

## 2022-08-31 RX ORDER — DEXTROMETHORPHAN HYDROBROMIDE AND PROMETHAZINE HYDROCHLORIDE 15; 6.25 MG/5ML; MG/5ML
5 SYRUP ORAL 4 TIMES DAILY PRN
Qty: 140 ML | Refills: 0 | Status: SHIPPED | OUTPATIENT
Start: 2022-08-31 | End: 2022-09-07

## 2022-08-31 RX ORDER — AZITHROMYCIN 250 MG/1
TABLET, FILM COATED ORAL
Qty: 6 TABLET | Refills: 0 | Status: SHIPPED | OUTPATIENT
Start: 2022-08-31 | End: 2022-09-05

## 2022-08-31 RX ORDER — METHYLPREDNISOLONE 4 MG/1
TABLET ORAL
Qty: 1 KIT | Refills: 0 | Status: SHIPPED | OUTPATIENT
Start: 2022-08-31 | End: 2022-09-06

## 2022-08-31 NOTE — TELEPHONE ENCOUNTER
----- Message from Jim Shaniqua sent at 8/30/2022  9:31 AM EDT -----  Subject: Message to Provider    QUESTIONS  Information for Provider?  Patient needs a prescription for her cough and   sore throat.   ---------------------------------------------------------------------------  --------------  Jeremías HARTMAN  [phone]; OK to leave message on voicemail  ---------------------------------------------------------------------------  --------------  SCRIPT ANSWERS  undefined

## 2022-08-31 NOTE — PROGRESS NOTES
Lei Garrett is a 66 y.o. female evaluated via telephone on 8/31/2022 for Cough  . Chief Complaint   Patient presents with    Cough       Documentation:  I communicated with the patient and/or health care decision maker about . Patient is scheduled for same-day appointment complaining of cough sore throat since last 4 to 5 days. Patient reports she tried all the over-the-counter medications including DayQuil Mucinex that does not help. She has underlying history of smoking and has not been seen in the office since more than a year. Patient is complaining of productive cough and chest tightness denies any chest pain fever or any sick contact. Patient is complaining of wheezing. Patient is not up-to-date on any health screening. Details of this discussion including any medical advice provided:       1. Bronchitis  Patient needs to be seen in the office in 2 weeks after she will be done with treatment not up-to-date on health screening and blood test results. - methylPREDNISolone (MEDROL DOSEPACK) 4 MG tablet; Take by mouth. Dispense: 1 kit; Refill: 0  - azithromycin (ZITHROMAX) 250 MG tablet; 500 mg orally on day one followed by 250 mg daily on days two through five  Dispense: 6 tablet; Refill: 0  - promethazine-dextromethorphan (PROMETHAZINE-DM) 6.25-15 MG/5ML syrup; Take 5 mLs by mouth 4 times daily as needed for Cough  Dispense: 140 mL; Refill: 0    2. Personal history of tobacco use  Continue to work on smoking    Total Time: minutes: 21-30 minutes    Lei Garrett was evaluated through a synchronous (real-time) audio encounter. Patient identification was verified at the start of the visit. She (or guardian if applicable) is aware that this is a billable service, which includes applicable co-pays. This visit was conducted with the patient's (and/or legal guardian's) verbal consent. She has not had a related appointment within my department in the past 7 days or scheduled within the next 24 hours. The patient was located at Home: [place] [address]. The provider was located at [facility] (Appt Dept): [place]   [address] [place] [address].     Note: not billable if this call serves to triage the patient into an appointment for the relevant concern    Eduardo Saenz MD

## 2022-09-07 ENCOUNTER — TELEPHONE (OUTPATIENT)
Dept: FAMILY MEDICINE CLINIC | Age: 72
End: 2022-09-07

## 2022-09-07 NOTE — TELEPHONE ENCOUNTER
----- Message from Monster Malone sent at 9/7/2022 11:13 AM EDT -----  Subject: Message to Provider    QUESTIONS  Information for Provider? Patient has finished her last pill for her   cough, which is now better, but her ears are very plugged, going on over a   week. She isn't sure if Dr. Rolando Addison would like to see her, or if there is   something else that can help to get her ears unplugged. ---------------------------------------------------------------------------  --------------  Pooja KLEIN  [phone]; OK to leave message on voicemail  ---------------------------------------------------------------------------  --------------  SCRIPT ANSWERS  Relationship to Patient?  Self

## 2022-09-25 NOTE — PROGRESS NOTES
[facility] & [facility] PHYSICIANS  [facility] PHYSICIANS [address] [facility] [address].  [address] [address]  Dept: Kevin (:  1950) is a 74 y.o. female. Patient is here for evaluation of the following chief complaint(s):  Chief Complaint   Patient presents with    Ear Fullness     Bilateral ear fullness      Flu Vaccine     Declines flu vaccine         SUBJECTIVE/OBJECTIVE:  JANELLE Cotto is a 74 y.o. female patient. Patient is an established patient of Dr. Anna Ruiz . Patient has a known history of Hypertension , Hyperlipidemia , low back pain, allergies, vitamin d deficiency, history of covid,     RECURRENT SINUSITIS/ TINNITUS  Patient with a history of recurrent sinusitis, also had Sinus surgery, Patient is an established patient of  Dr Thomas Villegas, Having sinus pressure, post nasal drip, runny nose, sore throat, ear pressure, tinnitus. [address] has a well controlled hypertension. she is currently on lisinopril. Patient's most recent BP in the office was stable. she reports stable BP readings at home. Patient denies any adverse reaction to this therapy. she denies any CP, SOB, HA, or palpitations. Patient admits to exercising and adheres to low salt diet. No history of organ damage due to condition noted. Lab Results   Component Value Date/Time    CREATININE 0.51 2021 10:34 AM     Familia Acosta is currently on atorvastatin (Lipitor). Patient denies adverse reaction to this medication. Compliance with treatment thus far has been good. The patient is known to have coexisting coronary artery disease.  The 10-year CVD risk score (D'Agostino, et al., 2008) is: 33.9%    Values used to calculate the score:      Age: 74 years      Sex: Female      Diabetic: No      Tobacco smoker: Yes      Systolic Blood Pressure: 216 mmHg      Is BP treated: Yes      HDL Cholesterol: 47 mg/dL      Total Cholesterol: 204 mg/dL  Lab Results   Component Value pain, postnasal drip, rhinorrhea, sinus pressure and sore throat. Negative for congestion. Tinnitus   Eyes:  Negative for pain. Respiratory:  Negative for cough, chest tightness and wheezing. Cardiovascular:  Negative for chest pain and palpitations. Gastrointestinal:  Negative for abdominal pain, constipation, diarrhea, nausea and vomiting. Endocrine: Negative. Genitourinary: Negative. Musculoskeletal:  Positive for arthralgias and back pain. Allergic/Immunologic: Positive for environmental allergies. Neurological:  Negative for dizziness and headaches. Hematological:  Negative for adenopathy. Psychiatric/Behavioral:  Positive for sleep disturbance. Negative for suicidal ideas. The patient is nervous/anxious. Physical Exam  Vitals and nursing note reviewed. Constitutional:       Appearance: Normal appearance. HENT:      Head: Normocephalic. Right Ear: Tympanic membrane has decreased mobility. Left Ear: Tympanic membrane has decreased mobility. Nose: Mucosal edema present. Right Sinus: Maxillary sinus tenderness and frontal sinus tenderness present. Left Sinus: Maxillary sinus tenderness and frontal sinus tenderness present. Mouth/Throat:      Pharynx: Posterior oropharyngeal erythema present. Cardiovascular:      Rate and Rhythm: Normal rate and regular rhythm. Pulmonary:      Effort: Pulmonary effort is normal.      Breath sounds: Normal breath sounds. Lymphadenopathy:      Head:      Right side of head: Submandibular adenopathy present. Left side of head: Submandibular adenopathy present. Skin:     General: Skin is warm and dry. Neurological:      Mental Status: She is alert and oriented to person, place, and time. Psychiatric:         Mood and Affect: Mood is anxious. Speech: Speech is rapid and pressured. Thought Content: Thought content does not include suicidal ideation.        MEDICAL HISTORY      Diagnosis Date    Arthritis     spine    COVID 11/8/2021    Gallstones     Hearing loss     Hx of bronchitis     Hyperlipidemia     Hypertension     Seasonal allergies     Spondylolisthesis at L5-S1 level     s/p cage and fusion    Wellness examination 07/14/2021    pcp-Dr Doris Salguero visit june 2021      MEDICATIONS  Prior to Visit Medications    Medication Sig Taking? Authorizing Provider   fluticasone (FLONASE) 50 MCG/ACT nasal spray 2 sprays by Nasal route daily Yes DONN Joshi CNP   cetirizine (ZYRTEC) 10 MG tablet Take 1 tablet by mouth daily Yes DONN Joshi CNP   doxycycline hyclate (VIBRA-TABS) 100 MG tablet Take 1 tablet by mouth 2 times daily for 5 days Yes DONN Joshi CNP   lisinopril (PRINIVIL;ZESTRIL) 20 MG tablet TAKE 1 TABLET BY MOUTH EVERY DAY Yes DONN Joshi CNP   Risedronate Sodium 150 MG TABS Take 1 tablet by mouth every 30 days Yes Eva Montoya MD   atorvastatin (LIPITOR) 20 MG tablet Take 1 tablet by mouth daily Yes Eva Montoya MD   SM ASPIRIN ADULT LOW STRENGTH 81 MG EC tablet TAKE 1 TABLET BY MOUTH EVERY DAY Yes Eva Montoya MD   ibuprofen (ADVIL;MOTRIN) 800 MG tablet Take 1 tablet by mouth every 6 hours as needed for Pain Yes Eva Montoya MD   albuterol sulfate  (90 Base) MCG/ACT inhaler Inhale 2 puffs into the lungs 4 times daily as needed for Wheezing Yes Eva Montoya MD   tiZANidine (ZANAFLEX) 4 MG tablet Take 1 tablet by mouth 3 times daily Yes Eva Montoya MD   MYRBETRIQ 50 MG TB24 TAKE 1 TABLET BY MOUTH ONE TIME A DAY  Yes Eva Montoya MD     Controlled Substance Monitoring:  Acute and Chronic Pain Monitoring:   RX Monitoring 11/12/2017   Attestation The Prescription Monitoring Report for this patient was reviewed today. ASSESSMENT/PLAN:  1. Recurrent sinusitis  Worsening  DISCUSSED AND ADVISED TO:  Rest.  Advised to increase fluid intake. Eat more fruits and vegetables.   Take medications as discussed. Report for worsening symptoms.    - doxycycline hyclate (VIBRA-TABS) 100 MG tablet; Take 1 tablet by mouth 2 times daily for 5 days  Dispense: 10 tablet; Refill: 0    2. Tinnitus of both ears  Worsening  Encouraged to follow up with the specialist   Dr Jazzmine Espinal  Treat sinusitis  Continue to monitor      3. Essential hypertension  Stable  Continue current therapy. DISCUSSED AND ADVISED TO:  Cut down on your salt intake. Cut down on caffeinated drinks, sports drinks. Instructed to check BP at home regularly. Report for any chest pains, shortness of breath, headaches, and lightheadedness. Call the office if your blood pressure continue to be higher than 140/90 or 90/50.    - CBC with Auto Differential; Future  - Comprehensive Metabolic Panel, Fasting; Future  - Urinalysis with Reflex to Culture; Future  - lisinopril (PRINIVIL;ZESTRIL) 20 MG tablet; TAKE 1 TABLET BY MOUTH EVERY DAY  Dispense: 30 tablet; Refill: 1    4. Mixed hyperlipidemia  Stable  Continue therapy. Advised to decrease the consumption of red meats, fried foods, trans fats, sweets, sugary beverages. Advised to increase fish, vegetables, and fruits consumption. Advised to add fiber or OTC supplements in diet. Discussed weight loss which will result in improvement of lipids levels. Advised to increase daily physical activities and add regular exercises. - Comprehensive Metabolic Panel, Fasting; Future  - Lipid, Fasting; Future    5. Chronic midline low back pain with bilateral sciatica  Stable  Continue current therapy. DISCUSSED AND ADVISED TO:  Use heat packs 15 to 20 mins every 2-3 hours. Do some back stretches as tolerated. Refer to hand out for instructions. Call for worsening, numbness, weakness. 6. Non-seasonal allergic rhinitis due to other allergic trigger  Stable  Continue with the same therapy   ADVISED TO:  Avoid known allergens/irritants. Stop smoking or avoid second hand smoke. Stay hydrated.   Report for worsening symptoms    - AFL - Livier Gallego MD, Otolaryngology, [place]  - fluticasone (FLONASE) 50 MCG/ACT nasal spray; 2 sprays by Nasal route daily  Dispense: 16 g; Refill: 0  - cetirizine (ZYRTEC) 10 MG tablet; Take 1 tablet by mouth daily  Dispense: 30 tablet; Refill: 1    7. Vitamin D deficiency  Stable  Continue Vitamin D supplementation  DISCUSSED AND ADVISED TO:  Foods that contain a lot of vitamin D includes [place], tuna, and mackerel. Cheese, egg yolks, and beef liver have small amounts of vit D.  Milk, soy drinks, orange juice, yogurt, margarine, and some kinds of cereal have vitamin D added to them. Continue to use sunblock when out in the sun to prevent skin cancer.    - Vitamin D 25 Hydroxy; Future    8. History of COVID-19  Stable  Continue to monitor      9. Hyperglycemia  Failure to Improve  Continue to evaluate  Recommend labs      - CBC with Auto Differential; Future  - Comprehensive Metabolic Panel, Fasting; Future  - Hemoglobin A1C; Future  - Urinalysis with Reflex to Culture; Future    10. Weight loss  Failure to Improve  Continue to evaluate  Recommend labs      - Vitamin D 25 Hydroxy; Future  - TSH; Future    11. Personal history of smoking  Failure to Improve  Continue to evaluate  Recommend ct lung screening    - CT LUNG SCREENING; Future   On this date 9/29/2022 I have spent 35 minutes reviewing previous notes, test results and face to face with the patient discussing the diagnosis and importance of compliance with the treatment plan as well as documenting on the day of the visit. Return in about 3 months (around 12/29/2022) for Chronic conditions, Appt w/ Dr. Romo. This note was completed by using the assistance of a speech-recognition program. However, inadvertent computerized transcription errors may be present.  Although every effort was made to ensure accuracy, no guarantees can be provided that every mistake has been identified and corrected by editing.   Electronically signed by DONN Ceja CNP on 9/25/22 at 6:38 PM EDT     --DONN Ceja CNP

## 2022-09-28 PROBLEM — E55.9 VITAMIN D DEFICIENCY: Status: ACTIVE | Noted: 2022-09-28

## 2022-09-28 PROBLEM — J30.9 ALLERGIC RHINITIS DUE TO ALLERGEN: Status: ACTIVE | Noted: 2022-09-28

## 2022-09-28 PROBLEM — E78.2 MIXED HYPERLIPIDEMIA: Status: ACTIVE | Noted: 2022-09-28

## 2022-09-28 PROBLEM — M54.41 CHRONIC MIDLINE LOW BACK PAIN WITH BILATERAL SCIATICA: Status: ACTIVE | Noted: 2022-09-28

## 2022-09-28 PROBLEM — M54.42 CHRONIC MIDLINE LOW BACK PAIN WITH BILATERAL SCIATICA: Status: ACTIVE | Noted: 2022-09-28

## 2022-09-28 PROBLEM — G89.29 CHRONIC MIDLINE LOW BACK PAIN WITH BILATERAL SCIATICA: Status: ACTIVE | Noted: 2022-09-28

## 2022-09-29 ENCOUNTER — OFFICE VISIT (OUTPATIENT)
Dept: FAMILY MEDICINE CLINIC | Age: 72
End: 2022-09-29
Payer: MEDICARE

## 2022-09-29 VITALS
HEART RATE: 69 BPM | HEIGHT: 62 IN | WEIGHT: 145.6 LBS | DIASTOLIC BLOOD PRESSURE: 88 MMHG | OXYGEN SATURATION: 96 % | TEMPERATURE: 97 F | SYSTOLIC BLOOD PRESSURE: 144 MMHG | BODY MASS INDEX: 26.79 KG/M2

## 2022-09-29 DIAGNOSIS — M54.42 CHRONIC MIDLINE LOW BACK PAIN WITH BILATERAL SCIATICA: ICD-10-CM

## 2022-09-29 DIAGNOSIS — J30.89 NON-SEASONAL ALLERGIC RHINITIS DUE TO OTHER ALLERGIC TRIGGER: ICD-10-CM

## 2022-09-29 DIAGNOSIS — E78.2 MIXED HYPERLIPIDEMIA: ICD-10-CM

## 2022-09-29 DIAGNOSIS — E55.9 VITAMIN D DEFICIENCY: ICD-10-CM

## 2022-09-29 DIAGNOSIS — J32.9 RECURRENT SINUSITIS: Primary | ICD-10-CM

## 2022-09-29 DIAGNOSIS — R73.9 HYPERGLYCEMIA: ICD-10-CM

## 2022-09-29 DIAGNOSIS — M54.41 CHRONIC MIDLINE LOW BACK PAIN WITH BILATERAL SCIATICA: ICD-10-CM

## 2022-09-29 DIAGNOSIS — H93.13 TINNITUS OF BOTH EARS: ICD-10-CM

## 2022-09-29 DIAGNOSIS — I10 ESSENTIAL HYPERTENSION: ICD-10-CM

## 2022-09-29 DIAGNOSIS — G89.29 CHRONIC MIDLINE LOW BACK PAIN WITH BILATERAL SCIATICA: ICD-10-CM

## 2022-09-29 DIAGNOSIS — Z86.16 HISTORY OF COVID-19: ICD-10-CM

## 2022-09-29 DIAGNOSIS — R63.4 WEIGHT LOSS: ICD-10-CM

## 2022-09-29 DIAGNOSIS — Z87.891 PERSONAL HISTORY OF SMOKING: ICD-10-CM

## 2022-09-29 PROCEDURE — 99214 OFFICE O/P EST MOD 30 MIN: CPT | Performed by: FAMILY MEDICINE

## 2022-09-29 PROCEDURE — 81003 URINALYSIS AUTO W/O SCOPE: CPT | Performed by: FAMILY MEDICINE

## 2022-09-29 PROCEDURE — 1123F ACP DISCUSS/DSCN MKR DOCD: CPT | Performed by: FAMILY MEDICINE

## 2022-09-29 RX ORDER — CETIRIZINE HYDROCHLORIDE 10 MG/1
10 TABLET ORAL DAILY
Qty: 30 TABLET | Refills: 1 | Status: SHIPPED | OUTPATIENT
Start: 2022-09-29 | End: 2022-10-29

## 2022-09-29 RX ORDER — FLUTICASONE PROPIONATE 50 MCG
2 SPRAY, SUSPENSION (ML) NASAL DAILY
Qty: 16 G | Refills: 0 | Status: SHIPPED | OUTPATIENT
Start: 2022-09-29

## 2022-09-29 RX ORDER — LISINOPRIL 20 MG/1
TABLET ORAL
Qty: 30 TABLET | Refills: 1 | Status: SHIPPED | OUTPATIENT
Start: 2022-09-29

## 2022-09-29 RX ORDER — DOXYCYCLINE HYCLATE 100 MG
100 TABLET ORAL 2 TIMES DAILY
Qty: 10 TABLET | Refills: 0 | Status: SHIPPED | OUTPATIENT
Start: 2022-09-29 | End: 2022-10-04

## 2022-09-29 SDOH — ECONOMIC STABILITY: FOOD INSECURITY: WITHIN THE PAST 12 MONTHS, THE FOOD YOU BOUGHT JUST DIDN'T LAST AND YOU DIDN'T HAVE MONEY TO GET MORE.: NEVER TRUE

## 2022-09-29 SDOH — ECONOMIC STABILITY: FOOD INSECURITY: WITHIN THE PAST 12 MONTHS, YOU WORRIED THAT YOUR FOOD WOULD RUN OUT BEFORE YOU GOT MONEY TO BUY MORE.: NEVER TRUE

## 2022-09-29 ASSESSMENT — ENCOUNTER SYMPTOMS
NAUSEA: 0
CHEST TIGHTNESS: 0
CONSTIPATION: 0
DIARRHEA: 0
ABDOMINAL PAIN: 0
SINUS PRESSURE: 1
RHINORRHEA: 1
EYE PAIN: 0
COUGH: 0
SORE THROAT: 1
WHEEZING: 0
VOMITING: 0
BACK PAIN: 1

## 2022-09-29 ASSESSMENT — PATIENT HEALTH QUESTIONNAIRE - PHQ9
SUM OF ALL RESPONSES TO PHQ9 QUESTIONS 1 & 2: 0
2. FEELING DOWN, DEPRESSED OR HOPELESS: 0
SUM OF ALL RESPONSES TO PHQ QUESTIONS 1-9: 0
SUM OF ALL RESPONSES TO PHQ QUESTIONS 1-9: 0
1. LITTLE INTEREST OR PLEASURE IN DOING THINGS: 0
SUM OF ALL RESPONSES TO PHQ QUESTIONS 1-9: 0
SUM OF ALL RESPONSES TO PHQ QUESTIONS 1-9: 0

## 2022-09-29 ASSESSMENT — SOCIAL DETERMINANTS OF HEALTH (SDOH): HOW HARD IS IT FOR YOU TO PAY FOR THE VERY BASICS LIKE FOOD, HOUSING, MEDICAL CARE, AND HEATING?: NOT HARD AT ALL

## 2022-09-29 NOTE — PATIENT INSTRUCTIONS
New Updates for [id number]/ [employer] ([facility]) CLAUDY    Thank you for choosing US to give you the best care! [employer] ([facility]) is always trying to think of new ways to help their patients. We are asking all patients to try out the new digital registration that is now available through your [facility] account or the new CLAUDY, [employer] ([facility]). Via the claudy you're now able to update your personal and registration information prior to your upcoming appointment. This will save you time once you arrive at the office to check-in, not to mention your information remains safe!! Many other perks come from signing up for an account, such as:  Requesting refills  Scheduling an appointment  Completing an E-Visit  Sending a message to the office/provider  Having access to your medication list  Paying your bill/copay prior to your appointment  Scheduling your yearly mammogram  Review your test results    If you are not familiar with [facility] or the [employer] ([facility]) CLAUDY, please ask one of us and we will be happy to answer any questions or help you set-up your account.       Your [id number] office,  [place]

## 2022-10-03 ENCOUNTER — TELEPHONE (OUTPATIENT)
Dept: FAMILY MEDICINE CLINIC | Age: 72
End: 2022-10-03

## 2022-10-03 NOTE — TELEPHONE ENCOUNTER
Pt called in through [facility] ([facility]) and asked if her antibiotics could be refilled. Writer adv ECC rep that they cannot be refilled but will send message back to provider.

## 2022-10-04 NOTE — TELEPHONE ENCOUNTER
She had several courses of antibiotics already. She has an appointment with ENT. She should make that. She can not continue to take antibiotic. That is not the only thing that will fix her current issue.

## 2022-10-05 NOTE — TELEPHONE ENCOUNTER
Called pt and adv need to see ENT, pt states she cant see the ENT that she was referred to because they do not take her insurance, needs new referral. Writer adv to contact insurance provider to obtain a name of a provider that is in network for her.

## 2022-10-13 ENCOUNTER — TELEPHONE (OUTPATIENT)
Dept: FAMILY MEDICINE CLINIC | Age: 72
End: 2022-10-13

## 2022-10-13 DIAGNOSIS — J32.9 RECURRENT SINUSITIS: ICD-10-CM

## 2022-10-13 DIAGNOSIS — H93.13 TINNITUS OF BOTH EARS: Primary | ICD-10-CM

## 2022-10-13 NOTE — TELEPHONE ENCOUNTER
----- Message from Irene Plasencia sent at 10/12/2022 10:38 AM EDT -----  Subject: Referral Request    Reason for referral request? Pt would like a referral to an ent in [facility]  Provider patient wants to be referred to(if known):     Provider Phone Number(if known):     Additional Information for Provider?   ---------------------------------------------------------------------------  --------------  [id number]    [phone]; OK to leave message on voicemail  ---------------------------------------------------------------------------  --------------

## 2022-10-18 ENCOUNTER — HOSPITAL ENCOUNTER (OUTPATIENT)
Age: 72
Discharge: HOME | End: 2022-10-18
Payer: MEDICARE

## 2022-10-18 DIAGNOSIS — E55.9 VITAMIN D DEFICIENCY: ICD-10-CM

## 2022-10-18 DIAGNOSIS — R73.9 HYPERGLYCEMIA: ICD-10-CM

## 2022-10-18 DIAGNOSIS — R63.4 WEIGHT LOSS: ICD-10-CM

## 2022-10-18 DIAGNOSIS — I10 ESSENTIAL HYPERTENSION: ICD-10-CM

## 2022-10-18 DIAGNOSIS — E78.2 MIXED HYPERLIPIDEMIA: ICD-10-CM

## 2022-10-18 LAB
ABSOLUTE EOS #: 0.1 K/UL (ref 0–0.4)
ABSOLUTE LYMPH #: 1.2 K/UL (ref 1–4.8)
ABSOLUTE MONO #: 0.4 K/UL (ref 0.1–1.3)
ALBUMIN SERPL-MCNC: 4.4 G/DL (ref 3.5–5.2)
ALP BLD-CCNC: 89 U/L (ref 35–104)
ALT SERPL-CCNC: 12 U/L (ref 5–33)
ANION GAP SERPL CALCULATED.3IONS-SCNC: 10 MMOL/L (ref 9–17)
AST SERPL-CCNC: 15 U/L
BACTERIA: NORMAL
BASOPHILS # BLD: 1 % (ref 0–2)
BASOPHILS ABSOLUTE: 0 K/UL (ref 0–0.2)
BILIRUB SERPL-MCNC: 0.6 MG/DL (ref 0.3–1.2)
BILIRUBIN URINE: NEGATIVE
BUN BLDV-MCNC: 10 MG/DL (ref 8–23)
CALCIUM SERPL-MCNC: 8.6 MG/DL (ref 8.6–10.4)
CASTS UA: NORMAL /LPF
CHLORIDE BLD-SCNC: 107 MMOL/L (ref 98–107)
CHOLESTEROL, FASTING: 194 MG/DL
CHOLESTEROL/HDL RATIO: 4.3
CO2: 26 MMOL/L (ref 20–31)
COLOR: YELLOW
CREAT SERPL-MCNC: 0.62 MG/DL (ref 0.5–0.9)
EOSINOPHILS RELATIVE PERCENT: 3 % (ref 0–4)
EPITHELIAL CELLS UA: NORMAL /HPF
ESTIMATED AVERAGE GLUCOSE: 103 MG/DL
GFR SERPL CREATININE-BSD FRML MDRD: >60 ML/MIN/1.73M2
GLUCOSE FASTING: 113 MG/DL (ref 70–99)
GLUCOSE URINE: NEGATIVE
HBA1C MFR BLD: 5.2 % (ref 4–6)
HCT VFR BLD CALC: 38.3 % (ref 36–46)
HDLC SERPL-MCNC: 45 MG/DL
HEMOGLOBIN: 13.4 G/DL (ref 12–16)
KETONES, URINE: NEGATIVE
LDL CHOLESTEROL: 124 MG/DL (ref 0–130)
LEUKOCYTE ESTERASE, URINE: ABNORMAL
LYMPHOCYTES # BLD: 22 % (ref 24–44)
MCH RBC QN AUTO: 32.9 PG (ref 26–34)
MCHC RBC AUTO-ENTMCNC: 35.1 G/DL (ref 31–37)
MCV RBC AUTO: 93.7 FL (ref 80–100)
MONOCYTES # BLD: 8 % (ref 1–7)
NITRITE, URINE: NEGATIVE
PDW BLD-RTO: 13.1 % (ref 11.5–14.9)
PH UA: 6.5 (ref 5–8)
PLATELET # BLD: 203 K/UL (ref 150–450)
PMV BLD AUTO: 9.3 FL (ref 6–12)
POTASSIUM SERPL-SCNC: 4 MMOL/L (ref 3.7–5.3)
PROTEIN UA: NEGATIVE
RBC # BLD: 4.09 M/UL (ref 4–5.2)
RBC UA: NORMAL /HPF
SEG NEUTROPHILS: 66 % (ref 36–66)
SEGMENTED NEUTROPHILS ABSOLUTE COUNT: 3.6 K/UL (ref 1.3–9.1)
SODIUM BLD-SCNC: 143 MMOL/L (ref 135–144)
SPECIFIC GRAVITY UA: 1.01 (ref 1–1.03)
TOTAL PROTEIN: 6.3 G/DL (ref 6.4–8.3)
TRIGLYCERIDE, FASTING: 125 MG/DL
TSH SERPL DL<=0.05 MIU/L-ACNC: 2.7 UIU/ML (ref 0.3–5)
TURBIDITY: CLEAR
URINE HGB: NEGATIVE
UROBILINOGEN, URINE: NORMAL
VITAMIN D 25-HYDROXY: 22.5 NG/ML
WBC # BLD: 5.3 K/UL (ref 3.5–11)
WBC UA: NORMAL /HPF

## 2022-10-18 PROCEDURE — 80061 LIPID PANEL: CPT

## 2022-10-18 PROCEDURE — 85025 COMPLETE CBC W/AUTO DIFF WBC: CPT

## 2022-10-18 PROCEDURE — 83036 HEMOGLOBIN GLYCOSYLATED A1C: CPT

## 2022-10-18 PROCEDURE — 84443 ASSAY THYROID STIM HORMONE: CPT

## 2022-10-18 PROCEDURE — 81001 URINALYSIS AUTO W/SCOPE: CPT

## 2022-10-18 PROCEDURE — 87086 URINE CULTURE/COLONY COUNT: CPT

## 2022-10-18 PROCEDURE — 82306 VITAMIN D 25 HYDROXY: CPT

## 2022-10-18 PROCEDURE — 80053 COMPREHEN METABOLIC PANEL: CPT

## 2022-10-18 PROCEDURE — 36415 COLL VENOUS BLD VENIPUNCTURE: CPT

## 2022-10-19 ENCOUNTER — TELEPHONE (OUTPATIENT)
Dept: ONCOLOGY | Age: 72
End: 2022-10-19

## 2022-10-19 ENCOUNTER — TELEPHONE (OUTPATIENT)
Dept: FAMILY MEDICINE CLINIC | Age: 72
End: 2022-10-19

## 2022-10-19 DIAGNOSIS — E55.9 VITAMIN D DEFICIENCY: Primary | ICD-10-CM

## 2022-10-19 DIAGNOSIS — E78.2 MIXED HYPERLIPIDEMIA: ICD-10-CM

## 2022-10-19 LAB
CULTURE: NORMAL
SPECIMEN DESCRIPTION: NORMAL

## 2022-10-19 RX ORDER — ATORVASTATIN CALCIUM 40 MG/1
40 TABLET, FILM COATED ORAL DAILY
Qty: 30 TABLET | Refills: 1 | Status: SHIPPED | OUTPATIENT
Start: 2022-10-19

## 2022-10-19 NOTE — TELEPHONE ENCOUNTER
Pt informed
checking his levels on an annual basis from now on. We can discuss this condition further on her next visit.    Lab Results   Component Value Date/Time    VITD25 22.5 (L) 10/18/2022 07:42 AM         Patient's lipids test results improving but not enough, I will increase her dose on her lipitor to 40 mg  Lab Results   Component Value Date/Time    CHOLFAST 194 10/18/2022 07:42 AM    CHOL 204 (H) 05/12/2021 08:56 AM    HDL 45 10/18/2022 07:42 AM    LDLCHOLESTEROL 124 10/18/2022 07:42 AM    TRIG 122 05/12/2021 08:56 AM    CHOLHDLRATIO 4.3 10/18/2022 07:42 AM    VLDL NOT REPORTED 05/12/2021 08:56 AM

## 2022-10-27 ENCOUNTER — HOSPITAL ENCOUNTER (OUTPATIENT)
Dept: CT IMAGING | Age: 72
Discharge: HOME | End: 2022-10-29
Payer: MEDICARE

## 2022-10-27 VITALS — WEIGHT: 145 LBS | HEIGHT: 62 IN | BODY MASS INDEX: 26.68 KG/M2

## 2022-10-27 DIAGNOSIS — Z87.891 PERSONAL HISTORY OF SMOKING: ICD-10-CM

## 2022-10-27 PROCEDURE — 71271 CT THORAX LUNG CANCER SCR C-: CPT

## 2022-10-28 NOTE — RESULT ENCOUNTER NOTE
Please notify patient:  CT shows mild to moderate emphysema, a stable 2 mm lung nodule, and atherosclerotic disease  Repeat CT lung screening in 1 year, might want to consider to have cardiology evaluation, to discuss with her PCP      Please schedule the patient for the Medicare visit it is overdue, her insurance wants to have her Medicare visit this year  And to keep her appointment for next year as well    Future Appointments  1/5/2023   1:45 PM    [facility]               [facility]

## 2022-11-03 DIAGNOSIS — M81.0 AGE-RELATED OSTEOPOROSIS WITHOUT CURRENT PATHOLOGICAL FRACTURE: ICD-10-CM

## 2022-11-03 RX ORDER — RISEDRONATE SODIUM 150 MG/1
1 TABLET, FILM COATED ORAL
Qty: 3 TABLET | Refills: 0 | Status: SHIPPED | OUTPATIENT
Start: 2022-11-03

## 2022-11-23 DIAGNOSIS — J30.89 NON-SEASONAL ALLERGIC RHINITIS DUE TO OTHER ALLERGIC TRIGGER: ICD-10-CM

## 2022-11-23 DIAGNOSIS — I10 ESSENTIAL HYPERTENSION: ICD-10-CM

## 2022-11-23 RX ORDER — CETIRIZINE HYDROCHLORIDE 10 MG/1
TABLET ORAL
Qty: 30 TABLET | Refills: 0 | Status: SHIPPED | OUTPATIENT
Start: 2022-11-23

## 2022-11-23 RX ORDER — FLUTICASONE PROPIONATE 50 MCG
SPRAY, SUSPENSION (ML) NASAL
Qty: 16 G | Refills: 0 | Status: SHIPPED | OUTPATIENT
Start: 2022-11-23

## 2022-11-23 RX ORDER — LISINOPRIL 20 MG/1
TABLET ORAL
Qty: 30 TABLET | Refills: 0 | Status: SHIPPED | OUTPATIENT
Start: 2022-11-23

## 2022-11-23 NOTE — TELEPHONE ENCOUNTER
Please Approve or Refuse.   Send to Pharmacy per Pt's Request:      Next Visit Date:  12/7/2022   Last Visit Date: 9/29/2022    Hemoglobin A1C (%)   Date Value   10/18/2022 5.2   12/19/2016 5.3             ( goal A1C is < 7)   BP Readings from Last 3 Encounters:   09/29/22 (!) 144/88   08/15/22 (!) 153/72   11/09/21 (!) 193/87          (goal 120/80)  BUN   Date Value Ref Range Status   10/18/2022 10 8 - 23 mg/dL Final     Creatinine   Date Value Ref Range Status   10/18/2022 0.62 0.50 - 0.90 mg/dL Final     Potassium   Date Value Ref Range Status   10/18/2022 4.0 3.7 - 5.3 mmol/L Final

## 2022-12-07 ENCOUNTER — OFFICE VISIT (OUTPATIENT)
Dept: FAMILY MEDICINE CLINIC | Age: 72
End: 2022-12-07

## 2022-12-07 VITALS
HEART RATE: 74 BPM | WEIGHT: 146 LBS | BODY MASS INDEX: 26.87 KG/M2 | OXYGEN SATURATION: 98 % | SYSTOLIC BLOOD PRESSURE: 152 MMHG | HEIGHT: 62 IN | DIASTOLIC BLOOD PRESSURE: 96 MMHG | TEMPERATURE: 97.2 F

## 2022-12-07 DIAGNOSIS — Z71.89 ACP (ADVANCE CARE PLANNING): ICD-10-CM

## 2022-12-07 DIAGNOSIS — Z87.891 PERSONAL HISTORY OF TOBACCO USE, PRESENTING HAZARDS TO HEALTH: ICD-10-CM

## 2022-12-07 DIAGNOSIS — H91.93 HEARING PROBLEM OF BOTH EARS: ICD-10-CM

## 2022-12-07 DIAGNOSIS — Z13.6 SCREENING FOR CARDIOVASCULAR CONDITION: ICD-10-CM

## 2022-12-07 DIAGNOSIS — Z00.00 MEDICARE ANNUAL WELLNESS VISIT, SUBSEQUENT: Primary | ICD-10-CM

## 2022-12-07 DIAGNOSIS — I10 ESSENTIAL HYPERTENSION: ICD-10-CM

## 2022-12-07 DIAGNOSIS — Z12.31 ENCOUNTER FOR SCREENING MAMMOGRAM FOR BREAST CANCER: ICD-10-CM

## 2022-12-07 RX ORDER — VARENICLINE TARTRATE 0.5 MG/1
.5-1 TABLET, FILM COATED ORAL SEE ADMIN INSTRUCTIONS
Qty: 57 TABLET | Refills: 0 | Status: SHIPPED | OUTPATIENT
Start: 2022-12-07

## 2022-12-07 RX ORDER — LISINOPRIL 40 MG/1
40 TABLET ORAL DAILY
Qty: 90 TABLET | Refills: 1 | Status: SHIPPED | OUTPATIENT
Start: 2022-12-07

## 2022-12-07 RX ORDER — METHOCARBAMOL 750 MG/1
TABLET ORAL
COMMUNITY
Start: 2022-10-20 | End: 2022-12-07 | Stop reason: SDUPTHER

## 2022-12-07 ASSESSMENT — LIFESTYLE VARIABLES
HOW OFTEN DO YOU HAVE A DRINK CONTAINING ALCOHOL: 2-4 TIMES A MONTH
HOW MANY STANDARD DRINKS CONTAINING ALCOHOL DO YOU HAVE ON A TYPICAL DAY: 1 OR 2

## 2022-12-07 ASSESSMENT — ENCOUNTER SYMPTOMS
COUGH: 0
WHEEZING: 0
SHORTNESS OF BREATH: 0
NAUSEA: 0
VOMITING: 0
BLOOD IN STOOL: 0
DIARRHEA: 0
ABDOMINAL PAIN: 0
ABDOMINAL DISTENTION: 0
BACK PAIN: 0
CONSTIPATION: 0
EYE REDNESS: 0
SORE THROAT: 0
RECTAL PAIN: 0
STRIDOR: 0
SINUS PRESSURE: 1
RHINORRHEA: 1
COLOR CHANGE: 0
SINUS PAIN: 1
CHEST TIGHTNESS: 0
TROUBLE SWALLOWING: 0
CHOKING: 1

## 2022-12-07 ASSESSMENT — PATIENT HEALTH QUESTIONNAIRE - PHQ9
1. LITTLE INTEREST OR PLEASURE IN DOING THINGS: 0
SUM OF ALL RESPONSES TO PHQ QUESTIONS 1-9: 0
2. FEELING DOWN, DEPRESSED OR HOPELESS: 0
SUM OF ALL RESPONSES TO PHQ9 QUESTIONS 1 & 2: 0
SUM OF ALL RESPONSES TO PHQ QUESTIONS 1-9: 0

## 2022-12-07 NOTE — PROGRESS NOTES
Visit Information    Have you changed or started any medications since your last visit including any over-the-counter medicines, vitamins, or herbal medicines? no   Have you stopped taking any of your medications? Is so, why? -  no  Are you having any side effects from any of your medications? - no    Have you seen any other physician or provider since your last visit?  no   Have you had any other diagnostic tests since your last visit? yes -    Have you been seen in the emergency room and/or had an admission in a hospital since we last saw you?  no   Have you had your routine dental cleaning in the past 6 months?  no     Do you have an active MyChart account? If no, what is the barrier?   No: PENDING    Patient Care Team:  Shiva Dorman MD as PCP - General (Family Medicine)  Shiva Dorman MD as PCP - [facility]  Wiley Gandhi MD as Surgeon (Otolaryngology)    Medical History Review  Past Medical, Family, and Social History reviewed and does contribute to the patient presenting condition    Health Maintenance   Topic Date Due    COVID-19 Vaccine (1) Never done    Shingles vaccine (1 of 2) Never done    Annual Wellness Visit (AWV)  05/07/2022    Breast cancer screen  09/18/2022    Depression Screen  09/29/2023    Lipids  10/18/2023    Low dose CT lung screening  10/27/2023    Colorectal Cancer Screen  11/21/2023    DTaP/Tdap/Td vaccine (2 - Td or Tdap) 04/04/2028    DEXA (modify frequency per FRAX score)  Completed    Pneumococcal 65+ years Vaccine  Completed    Hepatitis C screen  Completed    Hepatitis A vaccine  Aged Out    Hib vaccine  Aged Out    Meningococcal (ACWY) vaccine  Aged Out

## 2022-12-07 NOTE — PATIENT INSTRUCTIONS
have an account, please click on the \"Sign Up Now\" link. Current as of: February 9, 2022               Content Version: 13.4  © 2006-2022 [employer]. Care instructions adapted under license by [facility] ([facility]). If you have questions about a medical condition or this instruction, always ask your healthcare professional. [address] any warranty or liability for your use of this information. Learning About Being Active as an Older Adult  Why is being active important as you get older? Being active is one of the best things you can do for your health. And it's never too late to start. Being active--or getting active, if you aren't already--has definite benefits. It can:  Give you more energy,  Keep your mind sharp. Improve balance to reduce your risk of falls. Help you manage chronic illness with fewer medicines. No matter how old you are, how fit you are, or what health problems you have, there is a form of activity that will work for you. And the more physical activity you can do, the better your overall health will be. What kinds of activity can help you stay healthy? Being more active will make your daily activities easier. Physical activity includes planned exercise and things you do in daily life. There are four types of activity:  Aerobic. Doing aerobic activity makes your heart and lungs strong. Includes walking, dancing, and gardening. Aim for at least 2½ hours spread throughout the week. It improves your energy and can help you sleep better. Muscle-strengthening. This type of activity can help maintain muscle and strengthen bones. Includes climbing stairs, using resistance bands, and lifting or carrying heavy loads. Aim for at least twice a week. It can help protect the knees and other joints. Stretching. Stretching gives you better range of motion in joints and muscles. Includes upper arm stretches, calf stretches, and gentle yoga.   Aim for at least twice a week, preferably after your muscles are warmed up from other activities. It can help you function better in daily life. Balancing. This helps you stay coordinated and have good posture. Includes heel-to-toe walking, kareen chi, and certain types of yoga. Aim for at least 3 days a week. It can reduce your risk of falling. Even if you have a hard time meeting the recommendations, it's better to be more active than less active. All activity done in each category counts toward your weekly total. You'd be surprised how daily things like carrying groceries, keeping up with grandchildren, and taking the stairs can add up. What keeps you from being active? If you've had a hard time being more active, you're not alone. Maybe you remember being able to do more. Or maybe you've never thought of yourself as being active. It's frustrating when you can't do the things you want. Being more active can help. What's holding you back? Getting started. Have a goal, but break it into easy tasks. Small steps build into big accomplishments. Staying motivated. If you feel like skipping your activity, remember your goal. Maybe you want to move better and stay independent. Every activity gets you one step closer. Not feeling your best.  Start with 5 minutes of an activity you enjoy. Prove to yourself you can do it. As you get comfortable, increase your time. You may not be where you want to be. But you're in the process of getting there. Everyone starts somewhere. How can you find safe ways to stay active? Talk with your doctor about any physical challenges you're facing. Make a plan with your doctor if you have a health problem or aren't sure how to get started with activity. If you're already active, ask your doctor if there is anything you should change to stay safe as your body and health change. If you tend to feel dizzy after you take medicine, avoid activity at that time.  Try being active before you take your medicine. This will reduce your risk of falls. If you plan to be active at home, make sure to clear your space before you get started. Remove things like TV cords, coffee tables, and throw rugs. It's safest to have plenty of space to move freely. The key to getting more active is to take it slow and steady. Try to improve only a little bit at a time. Pick just one area to improve on at first. And if an activity hurts, stop and talk to your doctor. Where can you learn more? Go to https://chpepiceweb.[email or web address] org and sign in to your [employer] account. Enter P600 in the Search Health Information box to learn more about \"Learning About Being Active as an Older Adult. \"     If you do not have an account, please click on the \"Sign Up Now\" link. Current as of: January 26, 2022               Content Version: 13.4  © 2006-2022 [employer]. Care instructions adapted under license by [facility] ([facility]). If you have questions about a medical condition or this instruction, always ask your healthcare professional. [place] [address] any warranty or liability for your use of this information. Learning About Dental Care for Older Adults  Dental care for older adults: Overview  Dental care for older people is much the same as for younger adults. But older adults do have concerns that younger adults do not. Older adults may have problems with gum disease and decay on the roots of their teeth. They may need missing teeth replaced or broken fillings fixed. Or they may have dentures that need to be cared for. Some older adults may have trouble holding a toothbrush. You can help remind the person you are caring for to brush and floss their teeth or to clean their dentures. In some cases, you may need to do the brushing and other dental care tasks. People who have trouble using their hands or who have dementia may need this extra help. How can you help with dental care?   Normal dental care  To keep the teeth and gums healthy:  Brush the teeth with fluoride toothpaste twice a day--in the morning and at night--and floss at least once a day. Plaque can quickly build up on the teeth of older adults. Watch for the signs of gum disease. These signs include gums that bleed after brushing or after eating hard foods, such as apples. See a dentist regularly. Many experts recommend checkups every 6 months. Keep the dentist up to date on any new medications the person is taking. Encourage a balanced diet that includes whole grains, vegetables, and fruits, and that is low in saturated fat and sodium. Encourage the person you're caring for not to use tobacco products. They can affect dental and general health. Many older adults have a fixed income and feel that they can't afford dental care. But most [facility] and [facility] have programs in which dentists help older adults by lowering fees. Contact your area's public health offices or  for information about dental care in your area. Using a toothbrush  Older adults with arthritis sometimes have trouble brushing their teeth because they can't easily hold the toothbrush. Their hands and fingers may be stiff, painful, or weak. If this is the case, you can: Offer an electric toothbrush. Enlarge the handle of a non-electric toothbrush by wrapping a sponge, an elastic bandage, or adhesive tape around it. Push the toothbrush handle through a ball made of rubber or soft foam.  Make the handle longer and thicker by taping Popsicle sticks or tongue depressors to it. You may also be able to buy special toothbrushes, toothpaste dispensers, and floss holders. Your doctor may recommend a soft-bristle toothbrush if the person you care for bleeds easily. Bleeding can happen because of a health problem or from certain medicines. A toothpaste for sensitive teeth may help if the person you care for has sensitive teeth.   How do you brush and floss someone's teeth? If the person you are caring for has a hard time cleaning their teeth on their own, you may need to brush and floss their teeth for them. It may be easiest to have the person sit and face away from you, and to sit or stand behind them. That way you can steady their head against your arm as you reach around to floss and brush their teeth. Choose a place that has good lighting and is comfortable for both of you. Before you begin, gather your supplies. You will need gloves, floss, a toothbrush, and a container to hold water if you are not near a sink. Wash and dry your hands well and put on gloves. Start by flossing:  Gently work a piece of floss between each of the teeth toward the gums. A plastic flossing tool may make this easier, and they are available at most [facility]. Curve the floss around each tooth into a U-shape and gently slide it under the gum line. Move the floss firmly up and down several times to scrape off the plaque. After you've finished flossing, throw away the used floss and begin brushing:  Wet the brush and apply toothpaste. Place the brush at a 45-degree angle where the teeth meet the gums. Press firmly, and move the brush in small circles over the surface of the teeth. Be careful not to brush too hard. Vigorous brushing can make the gums pull away from the teeth and can scratch the tooth enamel. Brush all surfaces of the teeth, on the tongue side and on the cheek side. Pay special attention to the front teeth and all surfaces of the back teeth. Brush chewing surfaces with short back-and-forth strokes. After you've finished, help the person rinse the remaining toothpaste from their mouth. Where can you learn more? Go to https://sheree.Qijia Science and Technology. org and sign in to your Ibotta account. Enter Q588 in the Gorsh box to learn more about \"Learning About Dental Care for Older Adults. \"     If you do not have an account, please click on the \"Sign Up Now\" link. Current as of: June 16, 2022               Content Version: 13.4  © 2006-2022 Healthwise, [employer]. Care instructions adapted under license by [facility] ([facility]). If you have questions about a medical condition or this instruction, always ask your healthcare professional. [address] any warranty or liability for your use of this information. Learning About Vision Tests  What are vision tests? The four most common vision tests are visual acuity tests, refraction, visual field tests, and color vision tests. Visual acuity (sharpness) tests  These tests are used: To see if you need glasses or contact lenses. To monitor an eye problem. To check an eye injury. Visual acuity tests are done as part of routine exams. You may also have this test when you get your 's license or apply for some types of jobs. Visual field tests  These tests are used: To check for vision loss in any area of your range of vision. To screen for certain eye diseases. To look for nerve damage after a stroke, head injury, or other problem that could reduce blood flow to the brain. Refraction and color tests  A refraction test is done to find the right prescription for glasses and contact lenses. A color vision test is done to check for color blindness. Color vision is often tested as part of a routine exam. You may also have this test when you apply for a job where recognizing different colors is important, such as , electronics, or the [employer]. How are vision tests done? Visual acuity test   You cover one eye at a time. You read aloud from a wall chart across the room. You read aloud from a small card that you hold in your hand. Refraction   You look into a special device. The device puts lenses of different strengths in front of each eye to see how strong your glasses or contact lenses need to be.   Visual field tests   Your doctor may have you look through special machines. Or your doctor may simply have you stare straight ahead while they move a finger into and out of your field of vision. Color vision test   You look at pieces of printed test patterns in various colors. You say what number or symbol you see. Your doctor may have you trace the number or symbol using a pointer. How do these tests feel? There is very little chance of having a problem from this test. If dilating drops are used for a vision test, they may make the eyes sting and cause a medicine taste in the mouth. Follow-up care is a key part of your treatment and safety. Be sure to make and go to all appointments, and call your doctor if you are having problems. It's also a good idea to know your test results and keep a list of the medicines you take. Where can you learn more? Go to https://Nevrowhiteb.YESTODATE.COM. org and sign in to your [employer] account. Enter G551 in the Sentry Wireless box to learn more about \"Learning About Vision Tests. \"     If you do not have an account, please click on the \"Sign Up Now\" link. Current as of: January 24, 2022               Content Version: 13.4  © 2006-2022 Healthwise, [employer] ([employer]: [place]). Care instructions adapted under license by [facility] ([facility]). If you have questions about a medical condition or this instruction, always ask your healthcare professional. [place] [address] any warranty or liability for your use of this information. Advance Directives: Care Instructions  Overview  An advance directive is a legal way to state your wishes at the end of your life. It tells your family and your doctor what to do if you can't say what you want. There are two main types of advance directives. You can change them any time your wishes change. Living will. This form tells your family and your doctor your wishes about life support and other treatment. The form is also called a declaration. Medical power of .   This form lets you name a person to make treatment decisions for you when you can't speak for yourself. This person is called a health care agent (health care proxy, health care surrogate). The form is also called a durable power of  for health care. If you do not have an advance directive, decisions about your medical care may be made by a family member, or by a doctor or a  who doesn't know you. It may help to think of an advance directive as a gift to the people who care for you. If you have one, they won't have to make tough decisions by themselves. Follow-up care is a key part of your treatment and safety. Be sure to make and go to all appointments, and call your doctor if you are having problems. It's also a good idea to know your test results and keep a list of the medicines you take. What should you include in an advance directive? Many states have a unique advance directive form. (It may ask you to address specific issues.) Or you might use a universal form that's approved by many states. If your form doesn't tell you what to address, it may be hard to know what to include in your advance directive. Use the questions below to help you get started. Who do you want to make decisions about your medical care if you are not able to? What life-support measures do you want if you have a serious illness that gets worse over time or can't be cured? What are you most afraid of that might happen? (Maybe you're afraid of having pain, losing your independence, or being kept alive by machines.)  Where would you prefer to die? (Your home? A hospital? A nursing home?)  Do you want to donate your organs when you die? Do you want certain Anglican practices performed before you die? When should you call for help? Be sure to contact your doctor if you have any questions. Where can you learn more? Go to https://sheree.Zeo. org and sign in to your SafetyPay account.  Enter R264 in the Search Health Information box to learn more about \"Advance Directives: Care Instructions. \"     If you do not have an account, please click on the \"Sign Up Now\" link. Current as of: June 16, 2022               Content Version: 13.4  © 0979-1408 Healthwise, Incorporated. Care instructions adapted under license by [facility] ([facility]). If you have questions about a medical condition or this instruction, always ask your healthcare professional. [address] any warranty or liability for your use of this information. Learning About Living Haze Messing  What is a living will? A living will, also called a declaration, is a legal form. It tells your family and your doctor your wishes when you can't speak for yourself. It's used by the health professionals who will treat you as you near the end of your life or if you get seriously hurt or ill. If you put your wishes in writing, your loved ones and others will know what kind of care you want. They won't need to guess. This can ease your mind and be helpful to others. And you can change or cancel your living will at any time. A living will is not the same as an estate or property will. An estate will explains what you want to happen with your money and property after you die. How do you use it? Keep these facts in mind about how a living will is used. Your living will is used only if you can't speak or make decisions for yourself. Most often, one or more doctors must certify that you can't speak or decide for yourself before your living will takes effect. If you get better and can speak for yourself again, you can accept or refuse any treatment. It doesn't matter what you said in your living will. Some states may limit your right to refuse treatment in certain cases. For example, you may need to clearly state in your living will that you don't want artificial hydration and nutrition, such as being fed through a tube.   Is a living will a legal document? A living will is a legal document. Each state has its own laws about living ramirez. And a living will may be called something else in your state. Here are some things to know about living ramirez. You don't need an  to complete a living will. But legal advice can be helpful if your state's laws are unclear. It can also help if your health history is complicated or your family can't agree on what should be in your living will. You can change your living will at any time. Some people find that their wishes about end-of-life care change as their health changes. If you make big changes to your living will, complete a new form. If you move to another state, make sure that your living will is legal in the state where you now live. In most cases, doctors will respect your wishes even if you have a form from a different state. You might use a universal form that has been approved by many states. This kind of form can sometimes be filled out and stored online. Your digital copy will then be available wherever you have a connection to the internet. The doctors and nurses who need to treat you can find it right away. Your state may offer an online registry. This is another place where you can store your living will online. It's a good idea to get your living will notarized. This means using a person called a  to watch two people sign, or witness, your living will. What should you know when you create a living will? Here are some questions to ask yourself as you make your living will. Do you know enough about life support methods that might be used? If not, talk to your doctor so you know what might be done if you can't breathe on your own, your heart stops, or you can't swallow. What things would you still want to be able to do after you receive life-support methods? Would you want to be able to walk? To speak? To eat on your own? To live without the help of machines?   Do you want certain Yazdanism practices performed if you become very ill? If you have a choice, where do you want to be cared for? In your home? At a hospital or nursing home? If you have a choice at the end of your life, where would you prefer to die? At home? In a hospital or nursing home? Somewhere else? Would you prefer to be buried or cremated? Do you want your organs to be donated after you die? What should you do with your living will? Make sure that your family members and your health care agent have copies of your living will (also called a declaration). Give your doctor a copy of your living will. Ask to have it kept as part of your medical record. If you have more than one doctor, make sure that each one has a copy. Put a copy of your living will where it can be easily found. For example, some people may put a copy on their refrigerator door. If you are using a digital copy, be sure your doctor, family members, and health care agent know how to find and access it. Where can you learn more? Go to https://Telepopepiceweb.EQAL. org and sign in to your [employer] account. Enter P710 in the Dormzy box to learn more about \"Learning About Living [place]. \"     If you do not have an account, please click on the \"Sign Up Now\" link. Current as of: June 16, 2022               Content Version: 13.4  © 6432-9327 Healthwise, Incorporated. Care instructions adapted under license by [facility] ([facility]). If you have questions about a medical condition or this instruction, always ask your healthcare professional. [place] [address] any warranty or liability for your use of this information. Learning About Medical Power of   What is a medical power of ? A medical power of , also called a durable power of  for health care, is one type of the legal forms called advance directives.  It lets you name the person you want to make treatment decisions for you if you can't speak or decide for yourself. The person you choose is called your health care agent. This person is also called a health care proxy or health care surrogate. A medical power of  may be called something else in your state. How do you choose a health care agent? Choose your health care agent carefully. This person may or may not be a family member. Talk to the person before you make your final decision. Make sure he or she is comfortable with this responsibility. It's a good idea to choose someone who:  Is at least 25years old. Knows you well and understands what makes life meaningful for you. Understands your Worship and moral values. Will do what you want, not what he or she wants. Will be able to make difficult choices at a stressful time. Will be able to refuse or stop treatment, if that is what you would want, even if you could die. Will be firm and confident with health professionals if needed. Will ask questions to get needed information. Lives near you or agrees to travel to you if needed. Your family may help you make medical decisions while you can still be part of that process. But it's important to choose one person to be your health care agent in case you aren't able to make decisions for yourself. If you don't fill out the legal form and name a health care agent, the decisions your family can make may be limited. A health care agent may be called something else in your state. Who will make decisions for you if you don't have a health care agent? If you don't have a health care agent or a living will, you may not get the care you want. Decisions may be made by family members who disagree about your medical care. Or decisions may be made by a medical professional who doesn't know you well. In some cases, a  makes the decisions. When you name a health care agent, it is very clear who has the power to make health decisions for you.   How do you name a health care agent? You name your health care agent on a legal form. This form is usually called a medical power of . Ask your hospital, state bar association, or office on aging where to find these forms. You must sign the form to make it legal. Some states require you to get the form notarized. This means that a person called a  watches you sign the form and then he or she signs the form. Some states also require that two or more witnesses sign the form. Be sure to tell your family members and doctors who your health care agent is. Where can you learn more? Go to https://chpepiceweb.Kickplay. org and sign in to your sliceX account. Enter [id number] in the Mobile Tracing Services box to learn more about \"Learning About [address]. \"     If you do not have an account, please click on the \"Sign Up Now\" link. Current as of: October 6, 2021               Content Version: 13.4  © 2006-2022 [employer]. Care instructions adapted under license by [facility] ([facility]). If you have questions about a medical condition or this instruction, always ask your healthcare professional. [address] any warranty or liability for your use of this information. Deciding About Life-Prolonging Treatment  How can you decide about life-prolonging treatment? What is life-prolonging treatment? There are many kinds of treatment that can help you live longer. These may be needed for only a short time until your illness improves. Or you may use them over the long term to help keep you alive. Some treatments include the use of:  Medicines to slow the progress of certain diseases, such as heart disease, diabetes, cancer, AIDS, or Alzheimer's disease. Antibiotics to treat serious infections, such as pneumonia. Dialysis to clean your blood if your kidneys stop working. A breathing machine to help you breathe if you can't breathe on your own.  This machine pumps air into your lungs through a tube put into your throat. A feeding tube or an intravenous (IV) line to give you food and fluids if you can't eat or drink. Cardiopulmonary resuscitation (CPR) to try to restart your heart. The decision to receive treatments that may help you live longer is a personal one. You may want your doctor to do everything possible to keep you alive, even when your chance for recovery is small. Or you may choose to only have care to manage your pain and other symptoms. What are key points about this decision? If there is a good chance that your illness can be cured or managed, your doctor may advise you to first try available treatments. If these don't work, then you might think about stopping treatment. If you stop treatment, you will still receive care that focuses on pain relief and comfort. A decision to stop treatment that keeps you alive does not have to be permanent. You can always change your mind if your health starts to improve. Even though treatment focuses on helping you live longer, it may cause side effects that can greatly affect your quality of life. And it could affect how you spend time with your family and friends. If you still have personal goals that you want to pursue, you may want treatment that keeps you alive long enough to reach them. Why might you choose life-prolonging treatment? There is a good chance that your illness can be cured or managed. You think you can manage the possible side effects of treatment. You don't think treatment will get in the way of your quality of life. You have personal goals that you still want to pursue and achieve. Why might you choose to stop life-prolonging treatment? Your chance of surviving your illness is very low. You have tried all possible treatments for your illness, but they have not helped. You can no longer deal with the side effects of treatment.   You have already met the goals you set out to achieve in your life.  Your decision  Thinking about the facts and your feelings can help you make a decision that is right for you. Be sure you understand the benefits and risks of your options, and think about what else you need to do before you make the decision. Where can you learn more? Go to https://chpejarad.Queryly. org and sign in to your [employer] account. Enter M427 in the [place] box to learn more about \"Deciding About Life-Prolonging Treatment. \"     If you do not have an account, please click on the \"Sign Up Now\" link. Current as of: June 16, 2022               Content Version: 13.4  © 1205-9969 Healthwise, [employer]. Care instructions adapted under license by [facility] ([facility]). If you have questions about a medical condition or this instruction, always ask your healthcare professional. [address] any warranty or liability for your use of this information. Quitting Tobacco: Care Instructions  Quitting tobacco is much harder than simply changing a habit. Nicotine cravings make it hard to quit, but you can do it. Your doctor will help you set up the plan that best meets your needs. You will miss the nicotine at first. You may feel short-tempered and grumpy. You may have trouble sleeping or thinking clearly. The urge to use tobacco may continue for a time. Combining tools can raise your chances of success. You can use medicine along with counseling. And you can join a quit-tobacco program, such as the American Lung Association's Freedom from Smoking program.    Get support. Reach out to family and friends, and find a counselor to help you quit. Join a support group, such as Nicotine Anonymous. Go to www.smokefree. gov to sign up for text messaging support. Talk to your doctor or pharmacist about medicines that can help you quit. Medicines can help with cravings and withdrawal symptoms.  There are several over-the-counter choices, such as nicotine patches, gum, and lozenges. After you quit, do not use tobacco again, not even once. Get rid of all tobacco products and anything that reminds you of tobacco, such as ashtrays. Avoid things that make you reach for tobacco.  Change your daily routine. Take a different route to work, or eat a meal in a different place. Try to cut down on stress. Find ways to calm yourself, such as taking a hot bath or doing deep breathing exercises. Eat a healthy diet, and get regular exercise. Having healthy habits may help you quit using tobacco.     Don't give up on quitting if you use tobacco again. Most people quit and restart a few times before they quit for good. Follow-up care is a key part of your treatment and safety. Be sure to make and go to all appointments, and call your doctor if you are having problems. It's also a good idea to know your test results and keep a list of the medicines you take. Where can you learn more? Go to https://P[place] BookspeCapeco.[email or web address] org and sign in to your Sitesimon account. Enter Y006 in the [employer] box to learn more about \"Quitting Tobacco: Care Instructions. \"     If you do not have an account, please click on the \"Sign Up Now\" link. Current as of: November 8, 2021               Content Version: 13.4  © 2006-2022 Healthwise, Incorporated. Care instructions adapted under license by [facility] ([facility]). If you have questions about a medical condition or this instruction, always ask your healthcare professional. [place] [address] any warranty or liability for your use of this information. Learning About Benefits From Quitting Smoking  Why is it important to quit smoking? If you're thinking about quitting smoking, you may have a few reasons to be smoke-free. Your health may be one of them.   When you quit smoking, you lower your risk for many serious health problems, such as cancer, lung disease, heart attack, stroke, blood vessel disease, and blindness from macular degeneration. When you're smoke-free, you get sick less often, and you heal faster. You are less likely to get colds, flu, bronchitis, and pneumonia. As a nonsmoker, you may find that your mood is better and you are less stressed. When and how will you feel healthier? Quitting has real health benefits that start from day 1 of being smoke-free. And the longer you stay smoke-free, the healthier you get and the better you feel. The first hours  After just 20 minutes, your blood pressure and heart rate go down. That means there's less stress on your heart and blood vessels. Within 12 hours, the level of carbon monoxide in your blood drops back to normal. That makes room for more oxygen. With more oxygen in your body, you may notice that you have more energy than when you smoked. After 2 weeks  Your lungs start to work better. Your risk of heart attack starts to drop. After 1 month  When your lungs are clear, you cough less and breathe deeper, so it's easier to be active. Your sense of taste and smell return. That means you can enjoy food more than you have since you started smoking. Over the years  Over the years, your risks of heart disease, heart attack, and stroke are lower. After 10 years, your risk of dying from lung cancer is cut by about half. And your risk for many other types of cancer is lower too. How would quitting help others in your life? When you quit smoking, you improve the health of everyone who now breathes in your smoke. Their heart, lung, and cancer risks drop, much like yours. They are sick less. For babies and small children, living smoke-free means they're less likely to have ear infections, pneumonia, and bronchitis. If you're a woman who is or will be pregnant someday, quitting smoking means a healthier . Children who are close to you are less likely to become adult smokers. Where can you learn more?   Go to https://chpepiceweb.[email or web address] org and sign in to your [employer] account. Enter [id number] in the [facility] box to learn more about \"Learning About Benefits From Quitting Smoking. \"     If you do not have an account, please click on the \"Sign Up Now\" link. Current as of: October 28, 2021               Content Version: 13.4  © 2006-2022 Utilize [employer]. Care instructions adapted under license by [facility] ([facility]). If you have questions about a medical condition or this instruction, always ask your healthcare professional. [place] [address] any warranty or liability for your use of this information. Personalized Preventive Plan for Doris Ortiz - 12/7/2022  Medicare offers a range of preventive health benefits. Some of the tests and screenings are paid in full while other may be subject to a deductible, co-insurance, and/or copay. Some of these benefits include a comprehensive review of your medical history including lifestyle, illnesses that may run in your family, and various assessments and screenings as appropriate. After reviewing your medical record and screening and assessments performed today your provider may have ordered immunizations, labs, imaging, and/or referrals for you. A list of these orders (if applicable) as well as your Preventive Care list are included within your After Visit Summary for your review. Other Preventive Recommendations:    A preventive eye exam performed by an eye specialist is recommended every 1-2 years to screen for glaucoma; cataracts, macular degeneration, and other eye disorders. A preventive dental visit is recommended every 6 months. Try to get at least 150 minutes of exercise per week or 10,000 steps per day on a pedometer . Order or download the FREE \"Exercise & Physical Activity: Your Everyday Guide\" from [employer]. Call 8-764.663.3374 or search [employer] online.   You need 6627-5382 mg of calcium and 9996-9236 IU of vitamin D per day. It is possible to meet your calcium requirement with diet alone, but a vitamin D supplement is usually necessary to meet this goal.  When exposed to the sun, use a sunscreen that protects against both UVA and UVB radiation with an SPF of 30 or greater. Reapply every 2 to 3 hours or after sweating, drying off with a towel, or swimming. Always wear a seat belt when traveling in a car. Always wear a helmet when riding a bicycle or motorcycle.

## 2022-12-07 NOTE — PROGRESS NOTES
Medicare Annual Wellness Visit    Quentin Hoskins is here for Medicare AWV    Assessment & Plan   Medicare annual wellness visit, subsequent  Encounter for screening mammogram for breast cancer  -     BLAYNE Digital Screen Bilateral; Future  Personal history of tobacco use, presenting hazards to health  -     NM Smoking and Tobacco Use Cessation (Intermediate): 3-10 MINUTES [36101]  -     varenicline (CHANTIX) 0.5 MG tablet; Take 1-2 tablets by mouth See Admin Instructions 0.5mg DAILY for 3 days followed by 0.5mg TWICE DAILY for 4 days followed by 1mg TWICE DAILY, Disp-57 tablet, R-0Normal  ACP (advance care planning)  -     NM Advanced Care Planning (30+ minutes) [52665]  Essential hypertension  -     lisinopril (PRINIVIL;ZESTRIL) 40 MG tablet; Take 1 tablet by mouth daily, Disp-90 tablet, R-1Normal  Hearing problem of both ears  -     AFL - [facility], AUD, Audiology, [place]  Screening for cardiovascular condition  -     NM Intensive Behavior Counseling for Cardiovascular Diseases, 8-15 minutes []    Recommendations for Preventive Services Due: see orders and patient instructions/AVS.  Recommended screening schedule for the next 5-10 years is provided to the patient in written form: see Patient Instructions/AVS.     Return for dm ,htn, hld. Subjective     Patient is scheduled for Medicare wellness visit. Hypertension fairly controlled patient reports she takes 40 mg of lisinopril but she is on 20 mg on her chart. Lisinopril increased to 40 mg today, patient will call back with blood pressure readings from home and will check the dose of the lisinopril. Hearing problems  Is referred for hearing test.    Patient has recurrent sinus infections had surgery done in the past, reports she still gets sinus pain, patient has rescheduled appointment with ENT. Patient's complete Health Risk Assessment and screening values have been reviewed and are found in Flowsheets.  The following problems were reviewed today and where indicated follow up appointments were made and/or referrals ordered. Positive Risk Factor Screenings with Interventions:               General HRA Questions:  Select all that apply: (!) New or Increased Fatigue    Fatigue Interventions: chronic fatigue , has recent deaths in family .      See AVS for additional education material  See A/P for any pertinent orders       Weight and Activity:  Physical Activity: Inactive    Days of Exercise per Week: 0 days    Minutes of Exercise per Session: 0 min     On average, how many days per week do you engage in moderate to strenuous exercise (like a brisk walk)?: 0 days  Have you lost any weight without trying in the past 3 months?: No  Body mass index: (!) 26.7    Unintentional Weight Loss Interventions:  See AVS for additional education material  See A/P for any pertinent orders  Inactivity Interventions:  See AVS for additional education material  See A/P for any pertinent orders      Dentist Screen:  Have you seen the dentist within the past year?: (!) No    Intervention:  Patient comments: pts insurance does not coverage   See AVS for additional education material  See A/P for any pertinent orders    Hearing Screen:  Do you or your family notice any trouble with your hearing that hasn't been managed with hearing aids?: (!) Yes    Interventions: pt has hearing test done in the past about 8 years before, needs new test   Referred to Audiology  See AVS for additional education material  See A/P for any pertinent orders    Vision Screen:  Do you have difficulty driving, watching TV, or doing any of your daily activities because of your eyesight?: (!) Yes  Have you had an eye exam within the past year?: Yes  Vision Screening    Right eye Left eye Both eyes   Without correction 20/70 20/40 20/40   With correction        Interventions:    Pt already saw opthamologist   See AVS for additional education material  See A/P for any pertinent orders    Safety:  Do you have either shower bars, grab bars, non-slip mats or non-slip surfaces in your shower or bathtub?: (!) No  Interventions:  Patient declined any further interventions or treatment     Advanced Directives:  Do you have a Living Will?: (!) No    Intervention:  Living will discussed today, papers handed over to patient discussed about the power of . Patient will bring papers back. Advance Care Planning   Advanced Care Planning: Discussed the patients choices for care and treatment in case of a health event that adversely affects decision-making abilities. Also discussed the patients long-term treatment options. Reviewed with the patient the appropriate state-specific advance directive documents. Reviewed the process of designating a competent adult as an Agent (or -in-fact) that could take make health care decisions for the patient if incompetent. Patient was asked to complete the declaration forms, if they have not already, either acknowledge the forms by a public notary or an eligible witness and provide a signed copy to the practice office. Time spent (minutes): 30       Tobacco Use:  Tobacco Use: High Risk    Smoking Tobacco Use: Every Day    Smokeless Tobacco Use: Never    Passive Exposure: Not on file     E-cigarette/Vaping       Questions Responses    E-cigarette/Vaping Use Never User    Start Date     Passive Exposure     Quit Date     Counseling Given     Comments         Interventions: Patient has tried Chantix in the past restarted on that reports that has worked for her. Patient agrees to set a quit date -    See AVS for addional education material  See A/P for any pertinent orders    Tobacco Use Counseling: Patient was counseled on tobacco cessation. Based upon patient's motivation to change her behavior, the following plan was agreed upon: Chantix: risks and benefits of this medication discussed- patient will contact the office for any significant adverse effects.  Educational materials regarding tobacco cessation were provided. Provider spent 10 minutes counseling patient. Patient is up-to-date on CT lung screening    Review of Systems   Constitutional:  Positive for activity change. Negative for appetite change, fatigue, fever and unexpected weight change. HENT:  Positive for congestion, hearing loss, postnasal drip, rhinorrhea, sinus pressure, sinus pain and sneezing. Negative for ear pain, sore throat and trouble swallowing. Eyes:  Negative for redness and visual disturbance. Respiratory:  Positive for choking. Negative for cough, chest tightness, shortness of breath, wheezing and stridor. Cardiovascular:  Negative for chest pain, palpitations and leg swelling. Gastrointestinal:  Negative for abdominal distention, abdominal pain, blood in stool, constipation, diarrhea, nausea, rectal pain and vomiting. Endocrine: Negative for polydipsia, polyphagia and polyuria. Genitourinary:  Negative for difficulty urinating, flank pain, frequency and urgency. Musculoskeletal:  Positive for arthralgias. Negative for back pain, gait problem, myalgias and neck pain. Skin:  Negative for color change, rash and wound. Allergic/Immunologic: Negative for food allergies and immunocompromised state. Neurological:  Positive for numbness. Negative for dizziness, speech difficulty, weakness, light-headedness and headaches. Psychiatric/Behavioral:  Negative for agitation, behavioral problems, decreased concentration, dysphoric mood, hallucinations, sleep disturbance and suicidal ideas. The patient is nervous/anxious. Objective   Vitals:    12/07/22 1130 12/07/22 1133   BP: (!) 154/92 (!) 152/96   Pulse: 74    Temp: 97.2 °F (36.2 °C)    SpO2: 98%    Weight: 146 lb (66.2 kg)    Height: 5' 2\" (1.575 m)       Body mass index is 26.7 kg/m². Physical Exam  Vitals and nursing note reviewed. Constitutional:       General: She is not in acute distress.      Appearance: Normal appearance. She is well-developed. She is not diaphoretic. HENT:      Head: Normocephalic and atraumatic. Nose: Congestion present. Eyes:      General:         Right eye: No discharge. Left eye: No discharge. Extraocular Movements: Extraocular movements intact. Conjunctiva/sclera: Conjunctivae normal.      Pupils: Pupils are equal, round, and reactive to light. Neck:      Thyroid: No thyromegaly. Cardiovascular:      Rate and Rhythm: Normal rate and regular rhythm. Heart sounds: Normal heart sounds. No murmur heard. Pulmonary:      Effort: Pulmonary effort is normal. No respiratory distress. Breath sounds: Normal breath sounds. No wheezing or rhonchi. Abdominal:      General: Bowel sounds are normal. There is no distension. Palpations: Abdomen is soft. There is no mass. Tenderness: There is no abdominal tenderness. There is no right CVA tenderness, left CVA tenderness, guarding or rebound. Musculoskeletal:         General: No tenderness. Cervical back: Normal range of motion and neck supple. Spasms present. No rigidity. Thoracic back: No spasms. Normal range of motion. Lumbar back: Spasms present. Decreased range of motion. Lymphadenopathy:      Cervical: No cervical adenopathy. Skin:     Coloration: Skin is not jaundiced or pale. Findings: No bruising, erythema or rash. Neurological:      General: No focal deficit present. Mental Status: She is alert and oriented to person, place, and time. Cranial Nerves: No cranial nerve deficit. Sensory: No sensory deficit. Motor: No weakness or tremor. Coordination: Coordination normal.      Gait: Gait and tandem walk normal.      Deep Tendon Reflexes: Reflexes are normal and symmetric. Psychiatric:         Attention and Perception: Attention and perception normal. She is attentive. Mood and Affect: Mood is anxious. Mood is not depressed.  Affect is not tearful. Speech: She is communicative. Speech is not rapid and pressured, delayed or slurred. Behavior: Behavior normal. Behavior is not agitated or slowed. Thought Content: Thought content normal.         Judgment: Judgment normal.                Allergies   Allergen Reactions    Influenza Vaccines      Body aches skin pain     Bee Venom Other (See Comments)     numbness    Other Other (See Comments)     Spider bites-red streaks down leg-hospitalized with last bite    Penicillins Hives     Prior to Visit Medications    Medication Sig Taking?  Authorizing Provider   Cholecalciferol (VITAMIN D3) 25 MCG (1000 UT) CAPS TAKE 1 CAPSULE BY MOUTH EVERY DAY Yes Historical Provider, MD   varenicline (CHANTIX) 0.5 MG tablet Take 1-2 tablets by mouth See Admin Instructions 0.5mg DAILY for 3 days followed by 0.5mg TWICE DAILY for 4 days followed by 1mg Janice Slay Yes Ziyad Freeman MD   lisinopril (PRINIVIL;ZESTRIL) 40 MG tablet Take 1 tablet by mouth daily Yes Ziyad Freeman MD   fluticasone (FLONASE) 50 MCG/ACT nasal spray INSTILL 2 SPRAYS IN EACH NOSTRIL DAILY Yes Ziyad Freeman MD   cetirizine (ZYRTEC) 10 MG tablet TAKE 1 TABLET BY MOUTH EVERY DAY Yes Ziyad Freeman MD   Risedronate Sodium 150 MG TABS TAKE 1 TABLET BY MOUTH EVERY 30 DAYS Yes Ziyad Freeman MD   Cholecalciferol 1.25 MG (12093 UT) TABS Take 1 tablet by mouth once a week Yes DONN Joshi CNP   vitamin D (CHOLECALCIFEROL) 25 MCG (1000 UT) TABS tablet Take 1 tablet by mouth daily Yes DONN Joshi CNP   atorvastatin (LIPITOR) 40 MG tablet Take 1 tablet by mouth daily Yes DONN Joshi CNP   SM ASPIRIN ADULT LOW STRENGTH 81 MG EC tablet TAKE 1 TABLET BY MOUTH EVERY DAY Yes Ziyad Freeman MD   ibuprofen (ADVIL;MOTRIN) 800 MG tablet Take 1 tablet by mouth every 6 hours as needed for Pain Yes Ziyad Freeman MD   albuterol sulfate  (90 Base) MCG/ACT inhaler Inhale 2 puffs into the lungs 4 times daily

## 2022-12-22 DIAGNOSIS — J30.89 NON-SEASONAL ALLERGIC RHINITIS DUE TO OTHER ALLERGIC TRIGGER: ICD-10-CM

## 2022-12-22 DIAGNOSIS — E78.2 MIXED HYPERLIPIDEMIA: ICD-10-CM

## 2022-12-22 DIAGNOSIS — I10 ESSENTIAL HYPERTENSION: ICD-10-CM

## 2022-12-22 RX ORDER — LISINOPRIL 40 MG/1
TABLET ORAL
Qty: 30 TABLET | Refills: 0 | Status: SHIPPED | OUTPATIENT
Start: 2022-12-22

## 2022-12-22 RX ORDER — CETIRIZINE HYDROCHLORIDE 10 MG/1
TABLET ORAL
Qty: 30 TABLET | Refills: 0 | Status: SHIPPED | OUTPATIENT
Start: 2022-12-22

## 2022-12-22 RX ORDER — FLUTICASONE PROPIONATE 50 MCG
SPRAY, SUSPENSION (ML) NASAL
Qty: 16 G | Refills: 0 | Status: SHIPPED | OUTPATIENT
Start: 2022-12-22

## 2022-12-22 RX ORDER — ATORVASTATIN CALCIUM 40 MG/1
TABLET, FILM COATED ORAL
Qty: 30 TABLET | Refills: 0 | Status: SHIPPED | OUTPATIENT
Start: 2022-12-22

## 2023-01-11 DIAGNOSIS — M81.0 AGE-RELATED OSTEOPOROSIS WITHOUT CURRENT PATHOLOGICAL FRACTURE: ICD-10-CM

## 2023-01-11 RX ORDER — RISEDRONATE SODIUM 150 MG/1
1 TABLET, FILM COATED ORAL
Qty: 3 TABLET | Refills: 3 | Status: SHIPPED | OUTPATIENT
Start: 2023-01-11

## 2023-01-13 DIAGNOSIS — E55.9 VITAMIN D DEFICIENCY: ICD-10-CM

## 2023-01-13 NOTE — TELEPHONE ENCOUNTER
Please Approve or Refuse.   Send to Pharmacy per Pt's Request:      Next Visit Date:  3/7/2023   Last Visit Date: 12/7/2022    Hemoglobin A1C (%)   Date Value   10/18/2022 5.2   12/19/2016 5.3             ( goal A1C is < 7)   BP Readings from Last 3 Encounters:   12/07/22 (!) 152/96   09/29/22 (!) 144/88   08/15/22 (!) 153/72          (goal 120/80)  BUN   Date Value Ref Range Status   10/18/2022 10 8 - 23 mg/dL Final     Creatinine   Date Value Ref Range Status   10/18/2022 0.62 0.50 - 0.90 mg/dL Final     Potassium   Date Value Ref Range Status   10/18/2022 4.0 3.7 - 5.3 mmol/L Final

## 2023-01-17 ENCOUNTER — OFFICE VISIT (OUTPATIENT)
Dept: FAMILY MEDICINE CLINIC | Age: 73
End: 2023-01-17
Payer: MEDICARE

## 2023-01-17 VITALS
DIASTOLIC BLOOD PRESSURE: 87 MMHG | TEMPERATURE: 98.6 F | SYSTOLIC BLOOD PRESSURE: 164 MMHG | OXYGEN SATURATION: 98 % | HEART RATE: 80 BPM | HEIGHT: 62 IN | BODY MASS INDEX: 26.68 KG/M2 | WEIGHT: 145 LBS

## 2023-01-17 DIAGNOSIS — J20.8 ACUTE VIRAL BRONCHITIS: Primary | ICD-10-CM

## 2023-01-17 DIAGNOSIS — R09.82 PND (POST-NASAL DRIP): ICD-10-CM

## 2023-01-17 PROCEDURE — 3078F DIAST BP <80 MM HG: CPT

## 2023-01-17 PROCEDURE — 3077F SYST BP >= 140 MM HG: CPT

## 2023-01-17 PROCEDURE — 1123F ACP DISCUSS/DSCN MKR DOCD: CPT

## 2023-01-17 PROCEDURE — 99213 OFFICE O/P EST LOW 20 MIN: CPT

## 2023-01-17 RX ORDER — PREDNISONE 20 MG/1
40 TABLET ORAL DAILY
Qty: 10 TABLET | Refills: 0 | Status: SHIPPED | OUTPATIENT
Start: 2023-01-17 | End: 2023-01-22

## 2023-01-17 RX ORDER — DEXTROMETHORPHAN HYDROBROMIDE AND PROMETHAZINE HYDROCHLORIDE 15; 6.25 MG/5ML; MG/5ML
5 SYRUP ORAL 3 TIMES DAILY PRN
Qty: 75 ML | Refills: 0 | Status: SHIPPED | OUTPATIENT
Start: 2023-01-17 | End: 2023-01-22

## 2023-01-17 RX ORDER — OMEPRAZOLE 40 MG/1
CAPSULE, DELAYED RELEASE ORAL
COMMUNITY
Start: 2023-01-04

## 2023-01-17 ASSESSMENT — ENCOUNTER SYMPTOMS
NAUSEA: 0
EYE PAIN: 0
ABDOMINAL PAIN: 0
SHORTNESS OF BREATH: 0
COUGH: 1
SORE THROAT: 1
EYE REDNESS: 0
VOMITING: 0
CHEST TIGHTNESS: 0
RHINORRHEA: 1

## 2023-01-17 ASSESSMENT — PATIENT HEALTH QUESTIONNAIRE - PHQ9
1. LITTLE INTEREST OR PLEASURE IN DOING THINGS: 0
SUM OF ALL RESPONSES TO PHQ9 QUESTIONS 1 & 2: 0
2. FEELING DOWN, DEPRESSED OR HOPELESS: 0
SUM OF ALL RESPONSES TO PHQ QUESTIONS 1-9: 0

## 2023-01-17 NOTE — PROGRESS NOTES
[address] [address]  Dept: [phone]  Dept Fax: [phone]    Javan Giron is a 74 y.o. female who presents to the urgent care today for her medical conditions/complaints as notedbelow. Javan Giron is c/o of Cough (This has been going on since Sunday. Once she starts she cant stop. Almost feels like something is tickling in her throat. ), Headache, and Chest Congestion      HPI:     Patient deferred COVID testing today  Patient is a past smoker and is currently taking chantix, she quit a month ago    Cough  This is a new problem. Episode onset: in the past 2 days. The problem has been gradually worsening. The problem occurs constantly. The cough is Non-productive. Associated symptoms include headaches, nasal congestion, postnasal drip, rhinorrhea and a sore throat. Pertinent negatives include no chest pain, chills, ear congestion, ear pain, eye redness, fever, myalgias, rash, shortness of breath, sweats or weight loss. Nothing aggravates the symptoms. She has tried OTC cough suppressant and rest for the symptoms. The treatment provided no relief. Her past medical history is significant for bronchitis. There is no history of asthma or COPD. Chest Congestion  This is a new problem. Episode onset: in the past 2 days. The problem has been unchanged. Associated symptoms include congestion, coughing, fatigue, headaches and a sore throat. Pertinent negatives include no abdominal pain, chest pain, chills, fever, myalgias, nausea, rash or vomiting. Nothing aggravates the symptoms. Treatments tried: OTC cough medicine, yesterday. The treatment provided no relief.      Past Medical History:   Diagnosis Date    Arthritis     spine    COVID 11/8/2021    Gallstones     Hearing loss     Hx of bronchitis     Hyperlipidemia     Hypertension     Seasonal allergies     Spondylolisthesis at L5-S1 level     s/p cage and fusion    Wellness examination 07/14/2021    pcp-Dr Sukhdev Mitchell visit june 2021        Current Outpatient Medications   Medication Sig Dispense Refill    promethazine-dextromethorphan (PROMETHAZINE-DM) 6.25-15 MG/5ML syrup Take 5 mLs by mouth 3 times daily as needed for Cough 75 mL 0    predniSONE (DELTASONE) 20 MG tablet Take 2 tablets by mouth daily for 5 days 10 tablet 0    Cholecalciferol 1.25 MG (73214 UT) TABS Take 1 tablet by mouth once a week 12 tablet 1    Risedronate Sodium 150 MG TABS TAKE 1 TABLET BY MOUTH EVERY 30 DAYS 3 tablet 3    atorvastatin (LIPITOR) 40 MG tablet Take 1 tablet by mouth every day 30 tablet 0    fluticasone (FLONASE) 50 MCG/ACT nasal spray INHALE 2 SPRAYS IN EACH NOSTRIL ONE TIME A DAY 16 g 0    lisinopril (PRINIVIL;ZESTRIL) 40 MG tablet TAKE 1 TABLET BY MOUTH EVERY DAY 30 tablet 0    cetirizine (ZYRTEC) 10 MG tablet TAKE 1 TABLET BY MOUTH EVERY DAY 30 tablet 0    Cholecalciferol (VITAMIN D3) 25 MCG (1000 UT) CAPS TAKE 1 CAPSULE BY MOUTH EVERY DAY      varenicline (CHANTIX) 0.5 MG tablet Take 1-2 tablets by mouth See Admin Instructions 0.5mg DAILY for 3 days followed by 0.5mg TWICE DAILY for 4 days followed by 1mg TWICE DAILY 57 tablet 0    albuterol sulfate  (90 Base) MCG/ACT inhaler Inhale 2 puffs into the lungs 4 times daily as needed for Wheezing 54 g 1    tiZANidine (ZANAFLEX) 4 MG tablet Take 1 tablet by mouth 3 times daily 30 tablet 0    MYRBETRIQ 50 MG TB24 TAKE 1 TABLET BY MOUTH ONE TIME A DAY  90 tablet 0    omeprazole (PRILOSEC) 40 MG delayed release capsule TAKE 1 CAPSULE BY MOUTH DAILY 1 HOUR BEFORE DINNER      vitamin D (CHOLECALCIFEROL) 25 MCG (1000 UT) TABS tablet Take 1 tablet by mouth daily (Patient not taking: Reported on 1/17/2023) 90 tablet 0    SM ASPIRIN ADULT LOW STRENGTH 81 MG EC tablet TAKE 1 TABLET BY MOUTH EVERY DAY (Patient not taking: Reported on 1/17/2023) 90 tablet 0    ibuprofen (ADVIL;MOTRIN) 800 MG tablet Take 1 tablet by mouth every 6 hours as needed for Pain (Patient not taking: Reported on 1/17/2023) 20 tablet 0     No current facility-administered medications for this visit. Allergies   Allergen Reactions    Influenza Vaccines      Body aches skin pain     Bee Venom Other (See Comments)     numbness    Other Other (See Comments)     Spider bites-red streaks down leg-hospitalized with last bite    Penicillins Hives       Subjective:      Review of Systems   Constitutional:  Positive for fatigue. Negative for chills, fever and weight loss. HENT:  Positive for congestion, postnasal drip, rhinorrhea and sore throat. Negative for ear pain. Eyes:  Negative for pain and redness. Respiratory:  Positive for cough. Negative for chest tightness and shortness of breath. Cardiovascular:  Negative for chest pain. Gastrointestinal:  Negative for abdominal pain, nausea and vomiting. Musculoskeletal:  Negative for myalgias. Skin:  Negative for rash. Neurological:  Positive for headaches. Negative for dizziness. All other systems reviewed and are negative. 14 systems reviewed and negative except as listed in HPI. Objective:     Physical Exam  Vitals reviewed. Constitutional:       General: She is not in acute distress. Appearance: Normal appearance. She is not ill-appearing, toxic-appearing or diaphoretic. HENT:      Head: Normocephalic and atraumatic. Right Ear: External ear normal. No tenderness. A middle ear effusion is present. Tympanic membrane is not injected or erythematous. Left Ear: External ear normal. No tenderness. A middle ear effusion is present. Tympanic membrane is not injected or erythematous. Nose: Nasal tenderness and congestion present. No rhinorrhea. Right Sinus: No maxillary sinus tenderness or frontal sinus tenderness. Left Sinus: No maxillary sinus tenderness or frontal sinus tenderness. Mouth/Throat:      Lips: Pink.       Mouth: Mucous membranes are moist.      Pharynx: Oropharyngeal exudate and posterior oropharyngeal erythema present. No pharyngeal swelling. Comments: Thick white PND  Eyes:      General:         Right eye: No discharge. Left eye: No discharge. Extraocular Movements: Extraocular movements intact. Conjunctiva/sclera: Conjunctivae normal.      Pupils: Pupils are equal, round, and reactive to light. Cardiovascular:      Rate and Rhythm: Normal rate and regular rhythm. Heart sounds: Normal heart sounds. Pulmonary:      Effort: Pulmonary effort is normal. No respiratory distress. Breath sounds: Normal breath sounds. No stridor. No wheezing, rhonchi or rales. Chest:      Chest wall: No tenderness. Musculoskeletal:         General: No swelling or tenderness. Normal range of motion. Cervical back: Normal range of motion and neck supple. No rigidity or tenderness. Right lower leg: No edema. Left lower leg: No edema. Lymphadenopathy:      Cervical: Cervical adenopathy present. Skin:     General: Skin is warm and dry. Capillary Refill: Capillary refill takes less than 2 seconds. Findings: No erythema or rash. Neurological:      Mental Status: She is alert and oriented to person, place, and time. Motor: No weakness. Coordination: Coordination normal.      Gait: Gait normal.   Psychiatric:         Mood and Affect: Mood normal.         Behavior: Behavior normal.         Thought Content: Thought content normal.         Judgment: Judgment normal.     BP (!) 164/87 (Site: Left Upper Arm, Position: Sitting, Cuff Size: Large Adult)   Pulse 80   Temp 98.6 °F (37 °C) (Tympanic)   Ht 5' 2\" (1.575 m)   Wt 145 lb (65.8 kg)   SpO2 98%   BMI 26.52 kg/m²     Assessment:       Diagnosis Orders   1. Acute viral bronchitis  promethazine-dextromethorphan (PROMETHAZINE-DM) 6.25-15 MG/5ML syrup    predniSONE (DELTASONE) 20 MG tablet      2.  PND (post-nasal drip)            Lab Results Component Value Date     10/18/2022    K 4.0 10/18/2022     10/18/2022    CO2 26 10/18/2022    BUN 10 10/18/2022    CREATININE 0.62 10/18/2022    GLUCOSE 105 (H) 07/14/2021    CALCIUM 8.6 10/18/2022    PROT 6.3 (L) 10/18/2022    LABALBU 4.4 10/18/2022    BILITOT 0.6 10/18/2022    ALKPHOS 89 10/18/2022    AST 15 10/18/2022    ALT 12 10/18/2022    LABGLOM >60 10/18/2022    GFRAA >60 07/14/2021         Plan:   -Based on Hx and clinical examination, will treat as a viral bronchitis at this time.  -Advised to continue with symptomatic treatment.  -Use acetaminophen or ibuprofen for fever, sore throat, or muscle aches.  -Recommend using a cool-mist humidifier.  -Instructed to increase fluid intake.   -Suggested humidifier and mist therapy.  -Avoid irritants such as smoke. -Phenergan DM and prednisone Rx for cough  -Seek medical attention immediately for increased work of breathing or other concerning symptoms.  -Follow-up with PCP as needed. Return if symptoms worsen or fail to improve. Orders Placed This Encounter   Medications    promethazine-dextromethorphan (PROMETHAZINE-DM) 6.25-15 MG/5ML syrup     Sig: Take 5 mLs by mouth 3 times daily as needed for Cough     Dispense:  75 mL     Refill:  0    predniSONE (DELTASONE) 20 MG tablet     Sig: Take 2 tablets by mouth daily for 5 days     Dispense:  10 tablet     Refill:  0         Patient given educational materials - see patient instructions. Discussed use, benefit, and side effects of prescribed medications. All patient questions answered. Pt voiced understanding.     Electronically signed by DONN John NP on 1/17/2023 at 11:02 AM

## 2023-01-19 DIAGNOSIS — E55.9 VITAMIN D DEFICIENCY: ICD-10-CM

## 2023-01-19 NOTE — TELEPHONE ENCOUNTER
Please Approve or Refuse.  Send to Pharmacy per Pt's Request: [facility]      Next Visit Date:  3/7/2023   Last Visit Date: 12/7/2022    Hemoglobin A1C (%)   Date Value   10/18/2022 5.2   12/19/2016 5.3             ( goal A1C is < 7)   BP Readings from Last 3 Encounters:   01/17/23 (!) 164/87   12/07/22 (!) 152/96   09/29/22 (!) 144/88          (goal 120/80)  BUN   Date Value Ref Range Status   10/18/2022 10 8 - 23 mg/dL Final     Creatinine   Date Value Ref Range Status   10/18/2022 0.62 0.50 - 0.90 mg/dL Final     Potassium   Date Value Ref Range Status   10/18/2022 4.0 3.7 - 5.3 mmol/L Final

## 2023-02-27 ENCOUNTER — OFFICE VISIT (OUTPATIENT)
Dept: FAMILY MEDICINE CLINIC | Age: 73
End: 2023-02-27
Payer: MEDICARE

## 2023-02-27 VITALS
OXYGEN SATURATION: 99 % | DIASTOLIC BLOOD PRESSURE: 80 MMHG | HEART RATE: 80 BPM | SYSTOLIC BLOOD PRESSURE: 173 MMHG | TEMPERATURE: 97.9 F

## 2023-02-27 DIAGNOSIS — J32.9 RECURRENT SINUSITIS: ICD-10-CM

## 2023-02-27 DIAGNOSIS — B96.89 ACUTE BACTERIAL SINUSITIS: Primary | ICD-10-CM

## 2023-02-27 DIAGNOSIS — J01.90 ACUTE BACTERIAL SINUSITIS: Primary | ICD-10-CM

## 2023-02-27 PROCEDURE — 3077F SYST BP >= 140 MM HG: CPT

## 2023-02-27 PROCEDURE — 99213 OFFICE O/P EST LOW 20 MIN: CPT

## 2023-02-27 PROCEDURE — 3078F DIAST BP <80 MM HG: CPT

## 2023-02-27 PROCEDURE — 1123F ACP DISCUSS/DSCN MKR DOCD: CPT

## 2023-02-27 RX ORDER — AZITHROMYCIN 250 MG/1
250 TABLET, FILM COATED ORAL SEE ADMIN INSTRUCTIONS
Qty: 6 TABLET | Refills: 0 | Status: SHIPPED | OUTPATIENT
Start: 2023-02-27 | End: 2023-03-04

## 2023-02-27 RX ORDER — ECHINACEA PURPUREA EXTRACT 125 MG
1 TABLET ORAL PRN
Qty: 1 EACH | Refills: 3 | Status: SHIPPED | OUTPATIENT
Start: 2023-02-27

## 2023-02-27 RX ORDER — METHOCARBAMOL 750 MG/1
TABLET ORAL
COMMUNITY
Start: 2023-01-14

## 2023-02-27 ASSESSMENT — ENCOUNTER SYMPTOMS
SINUS PRESSURE: 1
HEMOPTYSIS: 0
RHINORRHEA: 0
SINUS COMPLAINT: 1
COUGH: 1
EYE ITCHING: 0
SORE THROAT: 1
SWOLLEN GLANDS: 1
SHORTNESS OF BREATH: 1
HEARTBURN: 0
HOARSE VOICE: 1
EYE PAIN: 0
COLOR CHANGE: 0

## 2023-02-27 NOTE — PROGRESS NOTES
[address] [address]  Dept: [phone]  Dept Fax: [phone]    Matilde Pendleton is a 74 y.o. female who presents to the urgent care today for her medical conditions/complaints as notedbelow. Matilde Pendleton is c/o of Sinus Problem (Pt stated that she is having sinus issues and believes she now has sinus drainage that is making her throat sore. )      HPI:     Patient did not take her BP medication this morning. Patient sees an ENT Dr. Kirstin Mcbride and is scheduling a sinus surgery    Sinus Problem  This is a recurrent problem. The current episode started in the past 7 days. The problem has been gradually worsening since onset. There has been no fever. Associated symptoms include congestion, coughing, headaches, a hoarse voice, shortness of breath, sinus pressure, a sore throat and swollen glands. Pertinent negatives include no chills, diaphoresis, ear pain, neck pain or sneezing. Past treatments include nothing. Cough  This is a new problem. The current episode started in the past 7 days. The problem has been gradually worsening. The problem occurs constantly. The cough is Productive of sputum (green). Associated symptoms include ear congestion, headaches, nasal congestion, postnasal drip, a sore throat and shortness of breath. Pertinent negatives include no chest pain, chills, ear pain, fever, heartburn, hemoptysis, myalgias, rash, rhinorrhea, sweats or weight loss. Nothing aggravates the symptoms. She has tried rest for the symptoms. Her past medical history is significant for environmental allergies. There is no history of bronchiectasis, bronchitis, COPD or pneumonia.      Past Medical History:   Diagnosis Date    Arthritis     spine    COVID 11/8/2021    Gallstones     Hearing loss     Hx of bronchitis     Hyperlipidemia     Hypertension     Seasonal allergies     Spondylolisthesis at L5-S1 level     s/p cage and fusion    Wellness examination 07/14/2021    pcp-Dr Tai Scherer-last visit june 2021        Current Outpatient Medications   Medication Sig Dispense Refill    azithromycin (ZITHROMAX) 250 MG tablet Take 1 tablet by mouth See Admin Instructions for 5 days 500mg on day 1 followed by 250mg on days 2 - 5 6 tablet 0    sodium chloride (ALTAMIST SPRAY) 0.65 % nasal spray 1 spray by Nasal route as needed for Congestion 1 each 3    vitamin D (CHOLECALCIFEROL) 25 MCG (1000 UT) TABS tablet Take 1 tablet by mouth daily 90 tablet 0    omeprazole (PRILOSEC) 40 MG delayed release capsule TAKE 1 CAPSULE BY MOUTH DAILY 1 HOUR BEFORE DINNER      Risedronate Sodium 150 MG TABS TAKE 1 TABLET BY MOUTH EVERY 30 DAYS 3 tablet 3    atorvastatin (LIPITOR) 40 MG tablet Take 1 tablet by mouth every day 30 tablet 0    fluticasone (FLONASE) 50 MCG/ACT nasal spray INHALE 2 SPRAYS IN EACH NOSTRIL ONE TIME A DAY 16 g 0    lisinopril (PRINIVIL;ZESTRIL) 40 MG tablet TAKE 1 TABLET BY MOUTH EVERY DAY 30 tablet 0    cetirizine (ZYRTEC) 10 MG tablet TAKE 1 TABLET BY MOUTH EVERY DAY 30 tablet 0    Cholecalciferol (VITAMIN D3) 25 MCG (1000 UT) CAPS TAKE 1 CAPSULE BY MOUTH EVERY DAY      varenicline (CHANTIX) 0.5 MG tablet Take 1-2 tablets by mouth See Admin Instructions 0.5mg DAILY for 3 days followed by 0.5mg TWICE DAILY for 4 days followed by 1mg TWICE DAILY 57 tablet 0    SM ASPIRIN ADULT LOW STRENGTH 81 MG EC tablet TAKE 1 TABLET BY MOUTH EVERY DAY 90 tablet 0    ibuprofen (ADVIL;MOTRIN) 800 MG tablet Take 1 tablet by mouth every 6 hours as needed for Pain 20 tablet 0    albuterol sulfate  (90 Base) MCG/ACT inhaler Inhale 2 puffs into the lungs 4 times daily as needed for Wheezing 54 g 1    tiZANidine (ZANAFLEX) 4 MG tablet Take 1 tablet by mouth 3 times daily 30 tablet 0    MYRBETRIQ 50 MG TB24 TAKE 1 TABLET BY MOUTH ONE TIME A DAY  90 tablet 0    D3-50 1.25 MG (44436 UT) CAPS TAKE ONE TABLET BY MOUTH ONCE A WEEK (Patient not taking: Reported on 2/27/2023)      Cholecalciferol 1.25 MG (40347 UT) TABS Take 1 tablet by mouth once a week (Patient not taking: Reported on 2/27/2023) 12 tablet 1     No current facility-administered medications for this visit. Allergies   Allergen Reactions    Influenza Vaccines      Body aches skin pain     Bee Venom Other (See Comments)     numbness    Other Other (See Comments)     Spider bites-red streaks down leg-hospitalized with last bite    Penicillins Hives       Subjective:      Review of Systems   Constitutional:  Positive for activity change and fatigue. Negative for chills, diaphoresis, fever and weight loss. HENT:  Positive for congestion, hoarse voice, postnasal drip, sinus pressure and sore throat. Negative for ear pain, rhinorrhea and sneezing. Eyes:  Negative for pain and itching. Respiratory:  Positive for cough and shortness of breath. Negative for hemoptysis. Cardiovascular:  Negative for chest pain. Gastrointestinal:  Negative for heartburn. Genitourinary:  Negative for difficulty urinating and dysuria. Musculoskeletal:  Negative for myalgias and neck pain. Skin:  Negative for color change and rash. Allergic/Immunologic: Positive for environmental allergies. Neurological:  Positive for headaches. Negative for dizziness. All other systems reviewed and are negative. 14 systems reviewed and negative except as listed in HPI. Objective:     Physical Exam  Vitals reviewed. Constitutional:       General: She is not in acute distress. Appearance: She is ill-appearing. She is not toxic-appearing or diaphoretic. HENT:      Head: Normocephalic and atraumatic. Right Ear: External ear normal. No drainage, swelling or tenderness. A middle ear effusion is present. Tympanic membrane is not injected or erythematous. Left Ear: External ear normal. No drainage, swelling or tenderness. A middle ear effusion is present.  Tympanic membrane is not injected or erythematous. Nose: Nasal tenderness, congestion and rhinorrhea present. Right Nostril: No occlusion. Left Nostril: No occlusion. Right Turbinates: Pale. Left Turbinates: Pale. Right Sinus: Maxillary sinus tenderness and frontal sinus tenderness present. Left Sinus: Frontal sinus tenderness present. No maxillary sinus tenderness. Mouth/Throat:      Lips: Pink. Mouth: Mucous membranes are moist.      Pharynx: Oropharyngeal exudate and posterior oropharyngeal erythema present. Comments: Thin clear PND  Eyes:      General:         Right eye: No discharge. Left eye: No discharge. Extraocular Movements: Extraocular movements intact. Conjunctiva/sclera: Conjunctivae normal.      Pupils: Pupils are equal, round, and reactive to light. Cardiovascular:      Rate and Rhythm: Normal rate and regular rhythm. Heart sounds: Normal heart sounds. Pulmonary:      Effort: Pulmonary effort is normal. No respiratory distress. Breath sounds: Normal breath sounds. No stridor. No wheezing, rhonchi or rales. Chest:      Chest wall: No tenderness. Musculoskeletal:         General: No swelling or tenderness. Normal range of motion. Cervical back: Normal range of motion and neck supple. No rigidity. Right lower leg: No edema. Left lower leg: No edema. Lymphadenopathy:      Cervical: Cervical adenopathy present. Skin:     General: Skin is warm and dry. Capillary Refill: Capillary refill takes less than 2 seconds. Findings: No erythema or rash. Neurological:      Mental Status: She is alert and oriented to person, place, and time. Motor: No weakness. Coordination: Coordination normal.      Gait: Gait normal.   Psychiatric:         Mood and Affect: Mood normal.         Behavior: Behavior normal.         Thought Content:  Thought content normal.         Judgment: Judgment normal.     BP (!) 173/80   Pulse 80 Temp 97.9 °F (36.6 °C)   SpO2 99%     Assessment:       Diagnosis Orders   1. Acute bacterial sinusitis  azithromycin (ZITHROMAX) 250 MG tablet      2. Recurrent sinusitis  azithromycin (ZITHROMAX) 250 MG tablet    sodium chloride (ALTAMIST SPRAY) 0.65 % nasal spray          Plan:   -Take BP medication ASAP  -Based on the duration and severity of the symptoms-- I will treat this as bacterial at this time.  -Patient instructed to complete antibiotic prescription fully. -May use Motrin/Tylenol for fever/pain.  -Saline washes, salt water gargles and over the counter preparations if desired.  -Continue zyrtec and Flonase  -Instructed to increase fluid intake.   -Suggested humidifier and mist therapy.  -Avoid irritants such as smoke. -May use over-the-counter expectorant such as coricidin   -Patient agreeable to treatment plan.  -Educational materials provided on AVS.  -Follow up with ENT    Return if symptoms worsen or fail to improve. Orders Placed This Encounter   Medications    azithromycin (ZITHROMAX) 250 MG tablet     Sig: Take 1 tablet by mouth See Admin Instructions for 5 days 500mg on day 1 followed by 250mg on days 2 - 5     Dispense:  6 tablet     Refill:  0    sodium chloride (ALTAMIST SPRAY) 0.65 % nasal spray     Si spray by Nasal route as needed for Congestion     Dispense:  1 each     Refill:  3         Patient given educational materials - see patient instructions. Discussed use, benefit, and side effects of prescribed medications. All patient questions answered. Pt voiced understanding.     Electronically signed by DONN Lopez NP on 2023 at 12:33 PM

## 2023-04-03 ENCOUNTER — HOSPITAL ENCOUNTER (OUTPATIENT)
Dept: PREADMISSION TESTING | Age: 73
Discharge: HOME | End: 2023-04-07
Payer: MEDICARE

## 2023-04-03 VITALS
HEART RATE: 72 BPM | OXYGEN SATURATION: 95 % | BODY MASS INDEX: 26.22 KG/M2 | SYSTOLIC BLOOD PRESSURE: 146 MMHG | TEMPERATURE: 97.8 F | HEIGHT: 63 IN | DIASTOLIC BLOOD PRESSURE: 77 MMHG | RESPIRATION RATE: 16 BRPM | WEIGHT: 148 LBS

## 2023-04-03 LAB
ANION GAP SERPL CALCULATED.3IONS-SCNC: 10 MMOL/L (ref 9–17)
BUN SERPL-MCNC: 9 MG/DL (ref 8–23)
CHLORIDE SERPL-SCNC: 104 MMOL/L (ref 98–107)
CO2 SERPL-SCNC: 24 MMOL/L (ref 20–31)
CREAT SERPL-MCNC: 0.6 MG/DL (ref 0.5–0.9)
EKG ATRIAL RATE: 62 BPM
EKG P AXIS: 27 DEGREES
EKG P-R INTERVAL: 128 MS
EKG Q-T INTERVAL: 416 MS
EKG QRS DURATION: 70 MS
EKG QTC CALCULATION (BAZETT): 422 MS
EKG R AXIS: 22 DEGREES
EKG T AXIS: 8 DEGREES
EKG VENTRICULAR RATE: 62 BPM
GFR SERPL CREATININE-BSD FRML MDRD: >60 ML/MIN/1.73M2
HCT VFR BLD AUTO: 37.2 % (ref 36.3–47.1)
HGB BLD-MCNC: 12.7 G/DL (ref 11.9–15.1)
MCH RBC QN AUTO: 32 PG (ref 25.2–33.5)
MCHC RBC AUTO-ENTMCNC: 34.1 G/DL (ref 28.4–34.8)
MCV RBC AUTO: 93.7 FL (ref 82.6–102.9)
NRBC AUTOMATED: 0 PER 100 WBC
PDW BLD-RTO: 12.6 % (ref 11.8–14.4)
PLATELET # BLD AUTO: 182 K/UL (ref 138–453)
PMV BLD AUTO: 10.6 FL (ref 8.1–13.5)
POTASSIUM SERPL-SCNC: 4.2 MMOL/L (ref 3.7–5.3)
RBC # BLD: 3.97 M/UL (ref 3.95–5.11)
SODIUM SERPL-SCNC: 138 MMOL/L (ref 135–144)
WBC # BLD AUTO: 5.2 K/UL (ref 3.5–11.3)

## 2023-04-03 PROCEDURE — 82565 ASSAY OF CREATININE: CPT

## 2023-04-03 PROCEDURE — 85027 COMPLETE CBC AUTOMATED: CPT

## 2023-04-03 PROCEDURE — 84520 ASSAY OF UREA NITROGEN: CPT

## 2023-04-03 PROCEDURE — 36415 COLL VENOUS BLD VENIPUNCTURE: CPT

## 2023-04-03 PROCEDURE — 80051 ELECTROLYTE PANEL: CPT

## 2023-04-03 PROCEDURE — 93005 ELECTROCARDIOGRAM TRACING: CPT | Performed by: STUDENT IN AN ORGANIZED HEALTH CARE EDUCATION/TRAINING PROGRAM

## 2023-04-03 RX ORDER — OXYMETAZOLINE HYDROCHLORIDE 0.05 G/100ML
2 SPRAY NASAL 2 TIMES DAILY PRN
OUTPATIENT
Start: 2023-04-17 | End: 2023-04-17

## 2023-04-03 NOTE — H&P
(PRINIVIL;ZESTRIL) 40 MG tablet TAKE 1 TABLET BY MOUTH EVERY DAY 12/22/22   Bianka Beckett APRN - CNP   varenicline (CHANTIX) 0.5 MG tablet Take 1-2 tablets by mouth See Admin Instructions 0.5mg DAILY for 3 days followed by 0.5mg TWICE DAILY for 4 days followed by 1mg TWICE DAILY  Patient not taking: Reported on 4/3/2023 12/7/22   Tiffani Marquez MD   ibuprofen (ADVIL;MOTRIN) 800 MG tablet Take 1 tablet by mouth every 6 hours as needed for Pain 11/2/21   Tiffani Marquez MD   albuterol sulfate  (90 Base) MCG/ACT inhaler Inhale 2 puffs into the lungs 4 times daily as needed for Wheezing 11/2/21   Tiffani Marquez MD   MYRBETRIQ 50 MG TB24 TAKE 1 TABLET BY MOUTH ONE TIME A DAY  8/30/21   Tfifani Marquez MD        Allergies:     Influenza vaccines, Bee venom, Other, and Penicillins    Social History:     Tobacco:    reports that she has been smoking cigarettes. She started smoking about 38 years ago. She has a 37.00 pack-year smoking history. She has never used smokeless tobacco.  Alcohol:      reports current alcohol use. Drug Use:  reports no history of drug use. Family History:     Family History   Problem Relation Age of Onset    Heart Attack Maternal Grandmother     Diabetes Father     Stroke Father     Dementia Mother        Review of Systems:     Positive and Negative as described in HPI. CONSTITUTIONAL: Easily fatigued. Negative for fevers, chills, sweats, and weight loss. HEENT: See HPI. Hearing loss from frequent ear infections as a child- no hearing aides. Wears glasses to read. Hoarse voice- patient states its improving compared to in the past.  RESPIRATORY: SOB with exertion. Negative for wheezing. CARDIOVASCULAR: Hyperlipidemia. Hypertension. Negative for chest pain, blood clot, irregular heartbeat, and palpitations. GASTROINTESTINAL: Hx gallstones. Negative for reflux, nausea, vomiting, diarrhea, constipation, change in bowel habits, and abdominal pain.    GENITOURINARY: Negative for

## 2023-04-03 NOTE — PRE-PROCEDURE INSTRUCTIONS
Our goal is to make your surgical experience as comfortable as possible    . Transportation After Your Surgery/Procedure: You will need a friend or family member to drive you home after your procedure. Your  must be 25years of age or older and able to sign off on your discharge instructions. A taxi cab or any other form of public transportation is not acceptable. Your friend or family member must stay at the hospital throughout your procedure. Someone must remain with you for the first 24 hours after your surgery if you receive anesthesia or medication. If you do not have someone to stay with you, your procedure may be cancelled.       If you have any other questions regarding your procedure or the day of surgery, please call [phone]      _________________________  ____________________________  Signature (Patient)              Signature (Provider)               Date

## 2023-04-06 DIAGNOSIS — I10 ESSENTIAL HYPERTENSION: ICD-10-CM

## 2023-04-06 RX ORDER — LISINOPRIL 40 MG/1
TABLET ORAL
Qty: 100 TABLET | Refills: 3 | Status: SHIPPED | OUTPATIENT
Start: 2023-04-06

## 2023-04-06 NOTE — TELEPHONE ENCOUNTER
Please Approve or Refuse.   Send to Pharmacy per Pt's Request:      Next Visit Date:  12/11/2023  Last Visit Date: 12/7/2022    Hemoglobin A1C (%)   Date Value   10/18/2022 5.2   12/19/2016 5.3             ( goal A1C is < 7)   BP Readings from Last 3 Encounters:   04/03/23 (!) 146/77   02/27/23 (!) 173/80   01/17/23 (!) 164/87          (goal 120/80)  BUN   Date Value Ref Range Status   04/03/2023 9 8 - 23 mg/dL Final     Creatinine   Date Value Ref Range Status   04/03/2023 0.60 0.50 - 0.90 mg/dL Final     Potassium   Date Value Ref Range Status   04/03/2023 4.2 3.7 - 5.3 mmol/L Final

## 2023-04-17 ENCOUNTER — ANESTHESIA (OUTPATIENT)
Dept: OPERATING ROOM | Age: 73
End: 2023-04-17
Payer: MEDICARE

## 2023-04-17 ENCOUNTER — ANESTHESIA EVENT (OUTPATIENT)
Dept: OPERATING ROOM | Age: 73
End: 2023-04-17
Payer: MEDICARE

## 2023-04-17 ENCOUNTER — HOSPITAL ENCOUNTER (OUTPATIENT)
Age: 73
Setting detail: OUTPATIENT SURGERY
Discharge: HOME | End: 2023-04-17
Attending: OTOLARYNGOLOGY | Admitting: OTOLARYNGOLOGY
Payer: MEDICARE

## 2023-04-17 VITALS
WEIGHT: 148 LBS | RESPIRATION RATE: 12 BRPM | OXYGEN SATURATION: 92 % | DIASTOLIC BLOOD PRESSURE: 84 MMHG | SYSTOLIC BLOOD PRESSURE: 186 MMHG | BODY MASS INDEX: 27.23 KG/M2 | HEART RATE: 77 BPM | TEMPERATURE: 98.4 F | HEIGHT: 62 IN

## 2023-04-17 DIAGNOSIS — J01.31 ACUTE RECURRENT SPHENOIDAL SINUSITIS: ICD-10-CM

## 2023-04-17 DIAGNOSIS — J32.2 CHRONIC ETHMOIDAL SINUSITIS: ICD-10-CM

## 2023-04-17 DIAGNOSIS — J34.3 HYPERTROPHY OF NASAL TURBINATES: ICD-10-CM

## 2023-04-17 DIAGNOSIS — J38.1 POLYP OF VOCAL CORD: ICD-10-CM

## 2023-04-17 DIAGNOSIS — J34.89 NASAL OBSTRUCTION: ICD-10-CM

## 2023-04-17 DIAGNOSIS — G89.18 POST-OPERATIVE PAIN: Primary | ICD-10-CM

## 2023-04-17 DIAGNOSIS — J32.0 CHRONIC MAXILLARY SINUSITIS: ICD-10-CM

## 2023-04-17 DIAGNOSIS — J01.21 ACUTE RECURRENT ETHMOIDAL SINUSITIS: ICD-10-CM

## 2023-04-17 DIAGNOSIS — J01.11 ACUTE RECURRENT FRONTAL SINUSITIS: ICD-10-CM

## 2023-04-17 DIAGNOSIS — J34.2 DEVIATED NASAL SEPTUM: ICD-10-CM

## 2023-04-17 PROCEDURE — 2580000003 HC RX 258: Performed by: ANESTHESIOLOGY

## 2023-04-17 PROCEDURE — 6370000000 HC RX 637 (ALT 250 FOR IP): Performed by: OTOLARYNGOLOGY

## 2023-04-17 PROCEDURE — 2500000003 HC RX 250 WO HCPCS: Performed by: ANESTHESIOLOGY

## 2023-04-17 PROCEDURE — 2720000010 HC SURG SUPPLY STERILE: Performed by: OTOLARYNGOLOGY

## 2023-04-17 PROCEDURE — 2500000003 HC RX 250 WO HCPCS: Performed by: OTOLARYNGOLOGY

## 2023-04-17 PROCEDURE — 7100000011 HC PHASE II RECOVERY - ADDTL 15 MIN: Performed by: OTOLARYNGOLOGY

## 2023-04-17 PROCEDURE — 6370000000 HC RX 637 (ALT 250 FOR IP): Performed by: ANESTHESIOLOGY

## 2023-04-17 PROCEDURE — 3600000003 HC SURGERY LEVEL 3 BASE: Performed by: OTOLARYNGOLOGY

## 2023-04-17 PROCEDURE — 3700000000 HC ANESTHESIA ATTENDED CARE: Performed by: OTOLARYNGOLOGY

## 2023-04-17 PROCEDURE — 2500000003 HC RX 250 WO HCPCS: Performed by: SPECIALIST

## 2023-04-17 PROCEDURE — 3600000013 HC SURGERY LEVEL 3 ADDTL 15MIN: Performed by: OTOLARYNGOLOGY

## 2023-04-17 PROCEDURE — 7100000001 HC PACU RECOVERY - ADDTL 15 MIN: Performed by: OTOLARYNGOLOGY

## 2023-04-17 PROCEDURE — A4216 STERILE WATER/SALINE, 10 ML: HCPCS | Performed by: ANESTHESIOLOGY

## 2023-04-17 PROCEDURE — 2709999900 HC NON-CHARGEABLE SUPPLY: Performed by: OTOLARYNGOLOGY

## 2023-04-17 PROCEDURE — 88305 TISSUE EXAM BY PATHOLOGIST: CPT

## 2023-04-17 PROCEDURE — 6360000002 HC RX W HCPCS: Performed by: OTOLARYNGOLOGY

## 2023-04-17 PROCEDURE — 88311 DECALCIFY TISSUE: CPT

## 2023-04-17 PROCEDURE — 3700000001 HC ADD 15 MINUTES (ANESTHESIA): Performed by: OTOLARYNGOLOGY

## 2023-04-17 PROCEDURE — 7100000010 HC PHASE II RECOVERY - FIRST 15 MIN: Performed by: OTOLARYNGOLOGY

## 2023-04-17 PROCEDURE — 7100000000 HC PACU RECOVERY - FIRST 15 MIN: Performed by: OTOLARYNGOLOGY

## 2023-04-17 PROCEDURE — 6360000002 HC RX W HCPCS: Performed by: SPECIALIST

## 2023-04-17 PROCEDURE — 6360000002 HC RX W HCPCS: Performed by: ANESTHESIOLOGY

## 2023-04-17 PROCEDURE — 2580000003 HC RX 258: Performed by: OTOLARYNGOLOGY

## 2023-04-17 RX ORDER — LIDOCAINE HYDROCHLORIDE 20 MG/ML
INJECTION, SOLUTION EPIDURAL; INFILTRATION; INTRACAUDAL; PERINEURAL PRN
Status: DISCONTINUED | OUTPATIENT
Start: 2023-04-17 | End: 2023-04-17 | Stop reason: SDUPTHER

## 2023-04-17 RX ORDER — PROPOFOL 10 MG/ML
INJECTION, EMULSION INTRAVENOUS PRN
Status: DISCONTINUED | OUTPATIENT
Start: 2023-04-17 | End: 2023-04-17 | Stop reason: SDUPTHER

## 2023-04-17 RX ORDER — SODIUM CHLORIDE, SODIUM LACTATE, POTASSIUM CHLORIDE, CALCIUM CHLORIDE 600; 310; 30; 20 MG/100ML; MG/100ML; MG/100ML; MG/100ML
INJECTION, SOLUTION INTRAVENOUS CONTINUOUS
Status: DISCONTINUED | OUTPATIENT
Start: 2023-04-17 | End: 2023-04-17 | Stop reason: HOSPADM

## 2023-04-17 RX ORDER — FENTANYL CITRATE 50 UG/ML
25 INJECTION, SOLUTION INTRAMUSCULAR; INTRAVENOUS EVERY 5 MIN PRN
Status: DISCONTINUED | OUTPATIENT
Start: 2023-04-17 | End: 2023-04-17 | Stop reason: HOSPADM

## 2023-04-17 RX ORDER — HYDROCODONE BITARTRATE AND ACETAMINOPHEN 5; 325 MG/1; MG/1
2 TABLET ORAL EVERY 6 HOURS PRN
Qty: 50 TABLET | Refills: 0 | Status: SHIPPED | OUTPATIENT
Start: 2023-04-17 | End: 2023-04-24

## 2023-04-17 RX ORDER — HYDROMORPHONE HYDROCHLORIDE 1 MG/ML
0.5 INJECTION, SOLUTION INTRAMUSCULAR; INTRAVENOUS; SUBCUTANEOUS EVERY 5 MIN PRN
Status: DISCONTINUED | OUTPATIENT
Start: 2023-04-17 | End: 2023-04-17 | Stop reason: HOSPADM

## 2023-04-17 RX ORDER — GINSENG 100 MG
CAPSULE ORAL PRN
Status: DISCONTINUED | OUTPATIENT
Start: 2023-04-17 | End: 2023-04-17 | Stop reason: ALTCHOICE

## 2023-04-17 RX ORDER — DEXAMETHASONE SODIUM PHOSPHATE 10 MG/ML
INJECTION, SOLUTION INTRAMUSCULAR; INTRAVENOUS PRN
Status: DISCONTINUED | OUTPATIENT
Start: 2023-04-17 | End: 2023-04-17 | Stop reason: SDUPTHER

## 2023-04-17 RX ORDER — ONDANSETRON 2 MG/ML
INJECTION INTRAMUSCULAR; INTRAVENOUS PRN
Status: DISCONTINUED | OUTPATIENT
Start: 2023-04-17 | End: 2023-04-17 | Stop reason: SDUPTHER

## 2023-04-17 RX ORDER — SODIUM CHLORIDE 9 MG/ML
INJECTION, SOLUTION INTRAVENOUS PRN
Status: DISCONTINUED | OUTPATIENT
Start: 2023-04-17 | End: 2023-04-17 | Stop reason: HOSPADM

## 2023-04-17 RX ORDER — SODIUM CHLORIDE 0.9 % (FLUSH) 0.9 %
5-40 SYRINGE (ML) INJECTION PRN
Status: DISCONTINUED | OUTPATIENT
Start: 2023-04-17 | End: 2023-04-17 | Stop reason: HOSPADM

## 2023-04-17 RX ORDER — FENTANYL CITRATE 50 UG/ML
INJECTION, SOLUTION INTRAMUSCULAR; INTRAVENOUS PRN
Status: DISCONTINUED | OUTPATIENT
Start: 2023-04-17 | End: 2023-04-17 | Stop reason: SDUPTHER

## 2023-04-17 RX ORDER — OXYMETAZOLINE HYDROCHLORIDE 0.05 G/100ML
SPRAY NASAL PRN
Status: DISCONTINUED | OUTPATIENT
Start: 2023-04-17 | End: 2023-04-17 | Stop reason: ALTCHOICE

## 2023-04-17 RX ORDER — OXYMETAZOLINE HYDROCHLORIDE 0.05 G/100ML
2 SPRAY NASAL 2 TIMES DAILY PRN
Status: DISCONTINUED | OUTPATIENT
Start: 2023-04-17 | End: 2023-04-17 | Stop reason: HOSPADM

## 2023-04-17 RX ORDER — LABETALOL HYDROCHLORIDE 5 MG/ML
INJECTION, SOLUTION INTRAVENOUS PRN
Status: DISCONTINUED | OUTPATIENT
Start: 2023-04-17 | End: 2023-04-17 | Stop reason: SDUPTHER

## 2023-04-17 RX ORDER — MIDAZOLAM HYDROCHLORIDE 1 MG/ML
INJECTION INTRAMUSCULAR; INTRAVENOUS PRN
Status: DISCONTINUED | OUTPATIENT
Start: 2023-04-17 | End: 2023-04-17 | Stop reason: SDUPTHER

## 2023-04-17 RX ORDER — LIDOCAINE HYDROCHLORIDE 10 MG/ML
1 INJECTION, SOLUTION EPIDURAL; INFILTRATION; INTRACAUDAL; PERINEURAL
Status: DISCONTINUED | OUTPATIENT
Start: 2023-04-18 | End: 2023-04-17 | Stop reason: HOSPADM

## 2023-04-17 RX ORDER — ROCURONIUM BROMIDE 10 MG/ML
INJECTION, SOLUTION INTRAVENOUS PRN
Status: DISCONTINUED | OUTPATIENT
Start: 2023-04-17 | End: 2023-04-17 | Stop reason: SDUPTHER

## 2023-04-17 RX ORDER — LABETALOL HYDROCHLORIDE 5 MG/ML
10 INJECTION, SOLUTION INTRAVENOUS ONCE
Status: COMPLETED | OUTPATIENT
Start: 2023-04-17 | End: 2023-04-17

## 2023-04-17 RX ORDER — ONDANSETRON 2 MG/ML
4 INJECTION INTRAMUSCULAR; INTRAVENOUS
Status: DISCONTINUED | OUTPATIENT
Start: 2023-04-17 | End: 2023-04-17 | Stop reason: HOSPADM

## 2023-04-17 RX ORDER — SODIUM CHLORIDE 9 MG/ML
INJECTION, SOLUTION INTRAVENOUS CONTINUOUS
Status: DISCONTINUED | OUTPATIENT
Start: 2023-04-17 | End: 2023-04-17 | Stop reason: HOSPADM

## 2023-04-17 RX ORDER — ACETAMINOPHEN 500 MG
1000 TABLET ORAL ONCE
Status: COMPLETED | OUTPATIENT
Start: 2023-04-17 | End: 2023-04-17

## 2023-04-17 RX ORDER — EPINEPHRINE 1 MG/ML
INJECTION, SOLUTION, CONCENTRATE INTRAVENOUS PRN
Status: DISCONTINUED | OUTPATIENT
Start: 2023-04-17 | End: 2023-04-17 | Stop reason: ALTCHOICE

## 2023-04-17 RX ORDER — CLINDAMYCIN HYDROCHLORIDE 300 MG/1
300 CAPSULE ORAL 3 TIMES DAILY
Qty: 21 CAPSULE | Refills: 0 | Status: SHIPPED | OUTPATIENT
Start: 2023-04-17 | End: 2023-04-24

## 2023-04-17 RX ORDER — SODIUM CHLORIDE 0.9 % (FLUSH) 0.9 %
SYRINGE (ML) INJECTION PRN
Status: DISCONTINUED | OUTPATIENT
Start: 2023-04-17 | End: 2023-04-17 | Stop reason: ALTCHOICE

## 2023-04-17 RX ORDER — LIDOCAINE HYDROCHLORIDE AND EPINEPHRINE 10; 10 MG/ML; UG/ML
INJECTION, SOLUTION INFILTRATION; PERINEURAL PRN
Status: DISCONTINUED | OUTPATIENT
Start: 2023-04-17 | End: 2023-04-17 | Stop reason: ALTCHOICE

## 2023-04-17 RX ORDER — CLINDAMYCIN PHOSPHATE 900 MG/50ML
900 INJECTION INTRAVENOUS ONCE
Status: COMPLETED | OUTPATIENT
Start: 2023-04-17 | End: 2023-04-17

## 2023-04-17 RX ORDER — SODIUM CHLORIDE 0.9 % (FLUSH) 0.9 %
5-40 SYRINGE (ML) INJECTION EVERY 12 HOURS SCHEDULED
Status: DISCONTINUED | OUTPATIENT
Start: 2023-04-17 | End: 2023-04-17 | Stop reason: HOSPADM

## 2023-04-17 RX ADMIN — FAMOTIDINE 20 MG: 10 INJECTION, SOLUTION INTRAVENOUS at 10:14

## 2023-04-17 RX ADMIN — ROCURONIUM BROMIDE 50 MG: 10 INJECTION, SOLUTION INTRAVENOUS at 10:29

## 2023-04-17 RX ADMIN — DEXAMETHASONE SODIUM PHOSPHATE 10 MG: 10 INJECTION, SOLUTION INTRAMUSCULAR; INTRAVENOUS at 10:39

## 2023-04-17 RX ADMIN — ACETAMINOPHEN 1000 MG: 500 TABLET ORAL at 10:13

## 2023-04-17 RX ADMIN — FENTANYL CITRATE 50 MCG: 50 INJECTION INTRAMUSCULAR; INTRAVENOUS at 10:43

## 2023-04-17 RX ADMIN — SODIUM CHLORIDE, POTASSIUM CHLORIDE, SODIUM LACTATE AND CALCIUM CHLORIDE: 600; 310; 30; 20 INJECTION, SOLUTION INTRAVENOUS at 09:07

## 2023-04-17 RX ADMIN — PROPOFOL 150 MG: 10 INJECTION, EMULSION INTRAVENOUS at 10:29

## 2023-04-17 RX ADMIN — PROPOFOL 50 MG: 10 INJECTION, EMULSION INTRAVENOUS at 10:43

## 2023-04-17 RX ADMIN — FENTANYL CITRATE 50 MCG: 50 INJECTION INTRAMUSCULAR; INTRAVENOUS at 10:29

## 2023-04-17 RX ADMIN — ONDANSETRON 4 MG: 2 INJECTION INTRAMUSCULAR; INTRAVENOUS at 12:11

## 2023-04-17 RX ADMIN — OXYMETAZOLINE HYDROCHLORIDE 2 SPRAY: 0.05 SPRAY NASAL at 09:53

## 2023-04-17 RX ADMIN — HYDROMORPHONE HYDROCHLORIDE 0.5 MG: 1 INJECTION, SOLUTION INTRAMUSCULAR; INTRAVENOUS; SUBCUTANEOUS at 12:46

## 2023-04-17 RX ADMIN — HYDROMORPHONE HYDROCHLORIDE 0.5 MG: 1 INJECTION, SOLUTION INTRAMUSCULAR; INTRAVENOUS; SUBCUTANEOUS at 12:41

## 2023-04-17 RX ADMIN — MIDAZOLAM 1 MG: 1 INJECTION INTRAMUSCULAR; INTRAVENOUS at 10:24

## 2023-04-17 RX ADMIN — SODIUM CHLORIDE, POTASSIUM CHLORIDE, SODIUM LACTATE AND CALCIUM CHLORIDE: 600; 310; 30; 20 INJECTION, SOLUTION INTRAVENOUS at 11:25

## 2023-04-17 RX ADMIN — LABETALOL HYDROCHLORIDE 5 MG: 5 INJECTION INTRAVENOUS at 10:52

## 2023-04-17 RX ADMIN — LIDOCAINE HYDROCHLORIDE 60 MG: 20 INJECTION, SOLUTION EPIDURAL; INFILTRATION; INTRACAUDAL; PERINEURAL at 10:29

## 2023-04-17 RX ADMIN — SUGAMMADEX 200 MG: 100 INJECTION, SOLUTION INTRAVENOUS at 12:14

## 2023-04-17 RX ADMIN — CLINDAMYCIN PHOSPHATE 900 MG: 900 INJECTION, SOLUTION INTRAVENOUS at 10:35

## 2023-04-17 RX ADMIN — LABETALOL HYDROCHLORIDE 10 MG: 5 INJECTION, SOLUTION INTRAVENOUS at 13:08

## 2023-04-17 ASSESSMENT — PAIN SCALES - GENERAL
PAINLEVEL_OUTOF10: 5
PAINLEVEL_OUTOF10: 0
PAINLEVEL_OUTOF10: 10
PAINLEVEL_OUTOF10: 10

## 2023-04-17 ASSESSMENT — PAIN DESCRIPTION - DESCRIPTORS
DESCRIPTORS: PATIENT UNABLE TO DESCRIBE
DESCRIPTORS: PATIENT UNABLE TO DESCRIBE

## 2023-04-17 ASSESSMENT — PAIN DESCRIPTION - LOCATION
LOCATION: NOSE;THROAT
LOCATION: NOSE;THROAT

## 2023-04-17 ASSESSMENT — PAIN - FUNCTIONAL ASSESSMENT: PAIN_FUNCTIONAL_ASSESSMENT: 0-10

## 2023-04-17 NOTE — ANESTHESIA POSTPROCEDURE EVALUATION
Department of Anesthesiology  Postprocedure Note    Patient: Maribel Tilley  MRN: [id number]  YOB: 1950  Date of evaluation: 4/17/2023      Procedure Summary     Date: 04/17/23 Room / Location: [facility] OR 04 / [place]     Anesthesia Start: 1024 Anesthesia Stop: 1232    Procedures:       SINUS ENDOSCOPY FUNCTIONAL SURGERY WITH NAVIGATION   ETHOMIDECTOMY, MAX ANTROSTOMY, FRONTAL SINUS EXPLORATION , RIGHT ESTELA BULLOSA RESECTION RIGHT, SEPTOPLASTY, KAIN TURB REDUCTION, DIRECT MICRO LARYNGOSCOPY  WITH EXCISION POLYP    CT SCAN FEB 6TH AT [facility]   (CPT CODES 08569,17248,[id number]) (Bilateral)      LARYNGOSCOPY MICRO DIRECT Diagnosis:       Deviated nasal septum      Hypertrophy of nasal turbinates      Polyp of vocal cord      Nasal obstruction      Chronic maxillary sinusitis      Acute recurrent frontal sinusitis      Acute recurrent ethmoidal sinusitis      Acute recurrent sphenoidal sinusitis      Chronic ethmoidal sinusitis      (Deviated nasal septum [J34.2])      (Hypertrophy of nasal turbinates [J34.3])      (Polyp of vocal cord [J38.1])      (Nasal obstruction [J34.89])      (Chronic maxillary sinusitis [J32.0])      (Acute recurrent frontal sinusitis [J01.11])      (Acute recurrent ethmoidal sinusitis [J01.21])      (Acute recurrent sphenoidal sinusitis [J01.31])      (Chronic ethmoidal sinusitis [J32.2])    Surgeons: Adrianna Ramirez MD Responsible Provider: Beto Walls DO    Anesthesia Type: general ASA Status: 3          Anesthesia Type: No value filed.     Trixie Phase I: Trixie Score: 10    Trixie Phase II: Trixie Score: 10      Anesthesia Post Evaluation    Patient location during evaluation: PACU  Patient participation: complete - patient participated  Level of consciousness: awake and alert  Airway patency: patent  Nausea & Vomiting: no nausea and no vomiting  Complications: no  Cardiovascular status: hemodynamically

## 2023-04-17 NOTE — OP NOTE
Oversize Router bivalve nasal splints were cut in half, covered in bacitracin ointment, and a piece was placed on each side of the septum and secured with a 3-0 silk suture. A nasal dressing of 2 x 2 gauze was applied to the nose for drainage. The patient tolerated the procedure well and was taken to the recovery room in satisfactory condition where both written and verbal instructions were given with regard to post-op care and follow-up.        Complications: None    Specimens:   ID Type Source Tests Collected by Time Destination   A : Left Vocal Cord Polyp Tissue Larynx SURGICAL PATHOLOGY Rohit Cohen MD 4/17/2023 1102    B : Right Vocal Cord Polyp Tissue Larynx SURGICAL PATHOLOGY Rohit Cohen MD 4/17/2023 1105    C : Bilateral Sinus Contents Tissue Sinus SURGICAL PATHOLOGY Rohit Cohen MD 4/17/2023 1142        Implants:  * No implants in log *      Drains: * No LDAs found *      Electronically signed by Rohit Cohen MD on 4/17/2023 at 2:36 PM

## 2023-04-17 NOTE — DISCHARGE INSTRUCTIONS
D/c home per anesthesia   Follow up with me in 1 week   Voice rest for 7 days, no whispering either   Regular diet as tolerated   Drink plenty of water   Change nasal drip pad as needed without occluding nostrils with it   Expect mucousy bloody drainage for a few days

## 2023-04-17 NOTE — INTERVAL H&P NOTE
Interval H&P Note    Pt Name: Gerson Rodriguez  MRN: [id number]  YOB: 1950  Date of evaluation: 4/17/2023      [x] I have reviewed history and physical  by Shakila Calderon dated 4/3/23 for an Interval History and Physical note. [x] I have examined  Maria L Cantu  There are no changes to the patient who is scheduled for SINUS ENDOSCOPY FUNCTIONAL SURGERY WITH NAVIGATION   ETHOMIDECTOMY, MAX ANTROSTOMY, FRONTAL SINUS EXPLORATION , RIGHT ESTELA BULLOSA RESECTION RIGHT, SEPTOPLASTY, KAIN TURB REDUCTION, DIRECT MICRO LARYNGOSCOPY  WITH EXCISION POLYP    CT SCAN FEB 6TH AT [facility]   (CPT CODES 39044,91568,[id number]), LARYNGOSCOPY MICRO DIRECT by Katharyn Buerger, MD for Deviated nasal septum; Hypertrophy of nasal turbinates; Polyp of vocal cor*. The patient denies new health changes, fever, chills, wheezing, cough, increased SOB, chest pain, open sores or wounds. Last ate and drank yesterday 4pm.     Vital signs: BP (!) 176/65   Pulse 69   Temp 97 °F (36.1 °C) (Temporal)   Resp 18   Ht 5' 2\" (1.575 m)   Wt 148 lb (67.1 kg)   SpO2 97%   BMI 27.07 kg/m²     Allergies:  Influenza vaccines, Bee venom, Other, and Penicillins    Medications:    Prior to Admission medications    Medication Sig Start Date End Date Taking?  Authorizing Provider   lisinopril (PRINIVIL;ZESTRIL) 40 MG tablet TAKE 1 TABLET BY MOUTH EVERY DAY 4/6/23   Jaki Valentine MD   sodium chloride (ALTAMIST SPRAY) 0.65 % nasal spray 1 spray by Nasal route as needed for Congestion 2/27/23   Jeremías Fernandez APRN - NP   vitamin D (CHOLECALCIFEROL) 25 MCG (1000 UT) TABS tablet Take 1 tablet by mouth daily 1/19/23 4/19/23  Jaki Valentine MD   omeprazole (PRILOSEC) 40 MG delayed release capsule TAKE 1 CAPSULE BY MOUTH DAILY 1 HOUR BEFORE DINNER 1/4/23   Historical Provider, MD   Cholecalciferol 1.25 MG (42656 UT) TABS Take 1 tablet by mouth once a week 1/13/23 4/13/23  Jaki Valentine MD   Risedronate Sodium 150

## 2023-04-19 LAB — SURGICAL PATHOLOGY REPORT: NORMAL

## 2023-04-28 ENCOUNTER — TELEPHONE (OUTPATIENT)
Dept: FAMILY MEDICINE CLINIC | Age: 73
End: 2023-04-28

## 2023-04-28 DIAGNOSIS — E78.2 MIXED HYPERLIPIDEMIA: Primary | ICD-10-CM

## 2023-04-28 RX ORDER — SIMVASTATIN 20 MG
20 TABLET ORAL NIGHTLY
Qty: 90 TABLET | Refills: 1 | Status: SHIPPED | OUTPATIENT
Start: 2023-04-28

## 2023-05-21 ENCOUNTER — APPOINTMENT (OUTPATIENT)
Dept: CT IMAGING | Age: 73
DRG: 884 | End: 2023-05-21
Payer: MEDICARE

## 2023-05-21 ENCOUNTER — APPOINTMENT (OUTPATIENT)
Dept: GENERAL RADIOLOGY | Age: 73
DRG: 884 | End: 2023-05-21
Payer: MEDICARE

## 2023-05-21 ENCOUNTER — HOSPITAL ENCOUNTER (INPATIENT)
Age: 73
LOS: 2 days | Discharge: HOME | DRG: 884 | End: 2023-05-23
Attending: EMERGENCY MEDICINE | Admitting: INTERNAL MEDICINE
Payer: MEDICARE

## 2023-05-21 DIAGNOSIS — R51.9 ACUTE NONINTRACTABLE HEADACHE, UNSPECIFIED HEADACHE TYPE: Primary | ICD-10-CM

## 2023-05-21 DIAGNOSIS — I16.0 HYPERTENSIVE URGENCY: ICD-10-CM

## 2023-05-21 DIAGNOSIS — M81.0 AGE-RELATED OSTEOPOROSIS WITHOUT CURRENT PATHOLOGICAL FRACTURE: ICD-10-CM

## 2023-05-21 DIAGNOSIS — R40.4 TRANSIENT ALTERATION OF AWARENESS: ICD-10-CM

## 2023-05-21 PROBLEM — R41.82 AMS (ALTERED MENTAL STATUS): Status: ACTIVE | Noted: 2023-05-21

## 2023-05-21 LAB
ALBUMIN SERPL-MCNC: 4.3 G/DL (ref 3.5–5.2)
ALP SERPL-CCNC: 105 U/L (ref 35–104)
ALT SERPL-CCNC: 12 U/L (ref 5–33)
ANION GAP SERPL CALCULATED.3IONS-SCNC: 12 MMOL/L (ref 9–17)
AST SERPL-CCNC: 15 U/L
BACTERIA URNS QL MICRO: NORMAL
BASOPHILS # BLD: 0 K/UL (ref 0–0.2)
BASOPHILS NFR BLD: 1 % (ref 0–2)
BILIRUB SERPL-MCNC: 0.4 MG/DL (ref 0.3–1.2)
BILIRUB UR QL STRIP: NEGATIVE
BUN SERPL-MCNC: 9 MG/DL (ref 8–23)
CALCIUM SERPL-MCNC: 8.9 MG/DL (ref 8.6–10.4)
CASTS #/AREA URNS LPF: NORMAL /LPF
CHLORIDE SERPL-SCNC: 103 MMOL/L (ref 98–107)
CLARITY UR: CLEAR
CO2 SERPL-SCNC: 23 MMOL/L (ref 20–31)
COLOR UR: YELLOW
CREAT SERPL-MCNC: 0.74 MG/DL (ref 0.5–0.9)
EOSINOPHIL # BLD: 0 K/UL (ref 0–0.4)
EOSINOPHILS RELATIVE PERCENT: 1 % (ref 0–4)
EPI CELLS #/AREA URNS HPF: NORMAL /HPF
ERYTHROCYTE [DISTWIDTH] IN BLOOD BY AUTOMATED COUNT: 13.4 % (ref 11.5–14.9)
GFR SERPL CREATININE-BSD FRML MDRD: >60 ML/MIN/1.73M2
GLUCOSE BLD-MCNC: 203 MG/DL
GLUCOSE SERPL-MCNC: 197 MG/DL (ref 70–99)
GLUCOSE UR STRIP-MCNC: NEGATIVE MG/DL
HCT VFR BLD AUTO: 38.2 % (ref 36–46)
HGB BLD-MCNC: 13.3 G/DL (ref 12–16)
HGB UR QL STRIP.AUTO: NEGATIVE
INR PPP: 0.9
KETONES UR STRIP-MCNC: ABNORMAL MG/DL
LEUKOCYTE ESTERASE UR QL STRIP: ABNORMAL
LYMPHOCYTES # BLD: 24 % (ref 24–44)
LYMPHOCYTES NFR BLD: 1.5 K/UL (ref 1–4.8)
MCH RBC QN AUTO: 32.1 PG (ref 26–34)
MCHC RBC AUTO-ENTMCNC: 34.7 G/DL (ref 31–37)
MCV RBC AUTO: 92.5 FL (ref 80–100)
MONOCYTES NFR BLD: 0.5 K/UL (ref 0.1–1.3)
MONOCYTES NFR BLD: 9 % (ref 1–7)
NEUTROPHILS NFR BLD: 65 % (ref 36–66)
NEUTS SEG NFR BLD: 4.2 K/UL (ref 1.3–9.1)
NITRITE UR QL STRIP: NEGATIVE
PH UR STRIP: 7.5 [PH] (ref 5–8)
PLATELET # BLD AUTO: 197 K/UL (ref 150–450)
PMV BLD AUTO: 8.6 FL (ref 6–12)
POTASSIUM SERPL-SCNC: 4.3 MMOL/L (ref 3.7–5.3)
PROT SERPL-MCNC: 6.7 G/DL (ref 6.4–8.3)
PROT UR STRIP-MCNC: NEGATIVE MG/DL
PROTHROMBIN TIME: 12.9 SEC (ref 11.8–14.6)
RBC # BLD AUTO: 4.13 M/UL (ref 4–5.2)
RBC #/AREA URNS HPF: NORMAL /HPF
SODIUM SERPL-SCNC: 138 MMOL/L (ref 135–144)
SP GR UR STRIP: 1.03 (ref 1–1.03)
TROPONIN I SERPL HS-MCNC: <6 NG/L (ref 0–14)
UROBILINOGEN UR STRIP-ACNC: NORMAL
WBC #/AREA URNS HPF: NORMAL /HPF
WBC OTHER # BLD: 6.3 K/UL (ref 3.5–11)

## 2023-05-21 PROCEDURE — 81001 URINALYSIS AUTO W/SCOPE: CPT

## 2023-05-21 PROCEDURE — 2060000000 HC ICU INTERMEDIATE R&B

## 2023-05-21 PROCEDURE — 6370000000 HC RX 637 (ALT 250 FOR IP): Performed by: NURSE PRACTITIONER

## 2023-05-21 PROCEDURE — 93005 ELECTROCARDIOGRAM TRACING: CPT | Performed by: EMERGENCY MEDICINE

## 2023-05-21 PROCEDURE — 6370000000 HC RX 637 (ALT 250 FOR IP): Performed by: EMERGENCY MEDICINE

## 2023-05-21 PROCEDURE — 6360000004 HC RX CONTRAST MEDICATION: Performed by: EMERGENCY MEDICINE

## 2023-05-21 PROCEDURE — 80053 COMPREHEN METABOLIC PANEL: CPT

## 2023-05-21 PROCEDURE — 6360000002 HC RX W HCPCS: Performed by: NURSE PRACTITIONER

## 2023-05-21 PROCEDURE — 85610 PROTHROMBIN TIME: CPT

## 2023-05-21 PROCEDURE — 99222 1ST HOSP IP/OBS MODERATE 55: CPT | Performed by: PSYCHIATRY & NEUROLOGY

## 2023-05-21 PROCEDURE — 70498 CT ANGIOGRAPHY NECK: CPT

## 2023-05-21 PROCEDURE — 2580000003 HC RX 258: Performed by: NURSE PRACTITIONER

## 2023-05-21 PROCEDURE — 84484 ASSAY OF TROPONIN QUANT: CPT

## 2023-05-21 PROCEDURE — 99285 EMERGENCY DEPT VISIT HI MDM: CPT

## 2023-05-21 PROCEDURE — 85025 COMPLETE CBC W/AUTO DIFF WBC: CPT

## 2023-05-21 PROCEDURE — 70450 CT HEAD/BRAIN W/O DYE: CPT

## 2023-05-21 PROCEDURE — 87086 URINE CULTURE/COLONY COUNT: CPT

## 2023-05-21 PROCEDURE — 71045 X-RAY EXAM CHEST 1 VIEW: CPT

## 2023-05-21 PROCEDURE — 36415 COLL VENOUS BLD VENIPUNCTURE: CPT

## 2023-05-21 PROCEDURE — 2580000003 HC RX 258: Performed by: EMERGENCY MEDICINE

## 2023-05-21 RX ORDER — SODIUM CHLORIDE 0.9 % (FLUSH) 0.9 %
5-40 SYRINGE (ML) INJECTION EVERY 12 HOURS SCHEDULED
Status: DISCONTINUED | OUTPATIENT
Start: 2023-05-21 | End: 2023-05-23 | Stop reason: HOSPADM

## 2023-05-21 RX ORDER — ENOXAPARIN SODIUM 100 MG/ML
40 INJECTION SUBCUTANEOUS DAILY
Status: DISCONTINUED | OUTPATIENT
Start: 2023-05-22 | End: 2023-05-23 | Stop reason: HOSPADM

## 2023-05-21 RX ORDER — VITAMIN B COMPLEX
1000 TABLET ORAL DAILY
Status: DISCONTINUED | OUTPATIENT
Start: 2023-05-22 | End: 2023-05-23 | Stop reason: HOSPADM

## 2023-05-21 RX ORDER — HYDRALAZINE HYDROCHLORIDE 20 MG/ML
10 INJECTION INTRAMUSCULAR; INTRAVENOUS ONCE
Status: COMPLETED | OUTPATIENT
Start: 2023-05-21 | End: 2023-05-21

## 2023-05-21 RX ORDER — SODIUM CHLORIDE 9 MG/ML
INJECTION, SOLUTION INTRAVENOUS PRN
Status: DISCONTINUED | OUTPATIENT
Start: 2023-05-21 | End: 2023-05-23 | Stop reason: HOSPADM

## 2023-05-21 RX ORDER — LISINOPRIL 20 MG/1
40 TABLET ORAL DAILY
Status: DISCONTINUED | OUTPATIENT
Start: 2023-05-22 | End: 2023-05-23 | Stop reason: HOSPADM

## 2023-05-21 RX ORDER — 0.9 % SODIUM CHLORIDE 0.9 %
100 INTRAVENOUS SOLUTION INTRAVENOUS ONCE
Status: COMPLETED | OUTPATIENT
Start: 2023-05-21 | End: 2023-05-21

## 2023-05-21 RX ORDER — SODIUM CHLORIDE 0.9 % (FLUSH) 0.9 %
10 SYRINGE (ML) INJECTION PRN
Status: DISCONTINUED | OUTPATIENT
Start: 2023-05-21 | End: 2023-05-23 | Stop reason: HOSPADM

## 2023-05-21 RX ORDER — ALBUTEROL SULFATE 90 UG/1
2 AEROSOL, METERED RESPIRATORY (INHALATION) 4 TIMES DAILY PRN
Status: DISCONTINUED | OUTPATIENT
Start: 2023-05-21 | End: 2023-05-23 | Stop reason: HOSPADM

## 2023-05-21 RX ORDER — ONDANSETRON 2 MG/ML
4 INJECTION INTRAMUSCULAR; INTRAVENOUS EVERY 6 HOURS PRN
Status: DISCONTINUED | OUTPATIENT
Start: 2023-05-21 | End: 2023-05-23 | Stop reason: HOSPADM

## 2023-05-21 RX ORDER — ACETAMINOPHEN 650 MG/1
650 SUPPOSITORY RECTAL EVERY 6 HOURS PRN
Status: DISCONTINUED | OUTPATIENT
Start: 2023-05-21 | End: 2023-05-23 | Stop reason: HOSPADM

## 2023-05-21 RX ORDER — LISINOPRIL 20 MG/1
40 TABLET ORAL ONCE
Status: COMPLETED | OUTPATIENT
Start: 2023-05-21 | End: 2023-05-21

## 2023-05-21 RX ORDER — ACETAMINOPHEN 325 MG/1
650 TABLET ORAL EVERY 6 HOURS PRN
Status: DISCONTINUED | OUTPATIENT
Start: 2023-05-21 | End: 2023-05-23 | Stop reason: HOSPADM

## 2023-05-21 RX ORDER — BUTALBITAL, ACETAMINOPHEN AND CAFFEINE 300; 40; 50 MG/1; MG/1; MG/1
1 CAPSULE ORAL EVERY 4 HOURS PRN
Status: DISCONTINUED | OUTPATIENT
Start: 2023-05-21 | End: 2023-05-23 | Stop reason: HOSPADM

## 2023-05-21 RX ORDER — ONDANSETRON 4 MG/1
4 TABLET, ORALLY DISINTEGRATING ORAL EVERY 8 HOURS PRN
Status: DISCONTINUED | OUTPATIENT
Start: 2023-05-21 | End: 2023-05-23 | Stop reason: HOSPADM

## 2023-05-21 RX ORDER — PANTOPRAZOLE SODIUM 40 MG/1
40 TABLET, DELAYED RELEASE ORAL
Status: DISCONTINUED | OUTPATIENT
Start: 2023-05-21 | End: 2023-05-23 | Stop reason: HOSPADM

## 2023-05-21 RX ORDER — ERGOCALCIFEROL 1.25 MG/1
50000 CAPSULE ORAL WEEKLY
Status: DISCONTINUED | OUTPATIENT
Start: 2023-05-22 | End: 2023-05-23 | Stop reason: HOSPADM

## 2023-05-21 RX ORDER — ACETAMINOPHEN 500 MG
1000 TABLET ORAL ONCE
Status: COMPLETED | OUTPATIENT
Start: 2023-05-21 | End: 2023-05-21

## 2023-05-21 RX ORDER — ATORVASTATIN CALCIUM 40 MG/1
40 TABLET, FILM COATED ORAL DAILY
Status: DISCONTINUED | OUTPATIENT
Start: 2023-05-22 | End: 2023-05-23 | Stop reason: HOSPADM

## 2023-05-21 RX ADMIN — SODIUM CHLORIDE, PRESERVATIVE FREE 10 ML: 5 INJECTION INTRAVENOUS at 16:12

## 2023-05-21 RX ADMIN — SODIUM CHLORIDE, PRESERVATIVE FREE 10 ML: 5 INJECTION INTRAVENOUS at 23:34

## 2023-05-21 RX ADMIN — ACETAMINOPHEN 1000 MG: 500 TABLET ORAL at 18:03

## 2023-05-21 RX ADMIN — LISINOPRIL 40 MG: 20 TABLET ORAL at 18:02

## 2023-05-21 RX ADMIN — BUTALBITA,ACETAMINOPHEN AND CAFFEINE 1 CAPSULE: 50; 300; 40 CAPSULE ORAL at 23:34

## 2023-05-21 RX ADMIN — HYDRALAZINE HYDROCHLORIDE 10 MG: 20 INJECTION INTRAMUSCULAR; INTRAVENOUS at 23:36

## 2023-05-21 RX ADMIN — IOPAMIDOL 75 ML: 755 INJECTION, SOLUTION INTRAVENOUS at 16:12

## 2023-05-21 RX ADMIN — PANTOPRAZOLE SODIUM 40 MG: 40 TABLET, DELAYED RELEASE ORAL at 23:34

## 2023-05-21 RX ADMIN — SODIUM CHLORIDE 100 ML: 9 INJECTION, SOLUTION INTRAVENOUS at 16:12

## 2023-05-21 ASSESSMENT — PAIN DESCRIPTION - ONSET: ONSET: ON-GOING

## 2023-05-21 ASSESSMENT — PAIN DESCRIPTION - LOCATION
LOCATION: HEAD

## 2023-05-21 ASSESSMENT — PAIN SCALES - GENERAL
PAINLEVEL_OUTOF10: 7
PAINLEVEL_OUTOF10: 8
PAINLEVEL_OUTOF10: 3
PAINLEVEL_OUTOF10: 8
PAINLEVEL_OUTOF10: 9

## 2023-05-21 ASSESSMENT — PAIN DESCRIPTION - ORIENTATION
ORIENTATION: LEFT

## 2023-05-21 ASSESSMENT — PAIN - FUNCTIONAL ASSESSMENT: PAIN_FUNCTIONAL_ASSESSMENT: 0-10

## 2023-05-21 ASSESSMENT — LIFESTYLE VARIABLES
HOW OFTEN DO YOU HAVE A DRINK CONTAINING ALCOHOL: NEVER
HOW MANY STANDARD DRINKS CONTAINING ALCOHOL DO YOU HAVE ON A TYPICAL DAY: PATIENT DOES NOT DRINK

## 2023-05-21 ASSESSMENT — PAIN DESCRIPTION - FREQUENCY: FREQUENCY: CONTINUOUS

## 2023-05-21 ASSESSMENT — ENCOUNTER SYMPTOMS: COUGH: 0

## 2023-05-22 ENCOUNTER — APPOINTMENT (OUTPATIENT)
Dept: MRI IMAGING | Age: 73
DRG: 884 | End: 2023-05-22
Payer: MEDICARE

## 2023-05-22 ENCOUNTER — APPOINTMENT (OUTPATIENT)
Dept: NON INVASIVE DIAGNOSTICS | Age: 73
DRG: 884 | End: 2023-05-22
Payer: MEDICARE

## 2023-05-22 PROBLEM — I67.4 HYPERTENSIVE ENCEPHALOPATHY: Status: ACTIVE | Noted: 2023-05-22

## 2023-05-22 LAB
ANION GAP SERPL CALCULATED.3IONS-SCNC: 7 MMOL/L (ref 9–17)
BASOPHILS # BLD: 0 K/UL (ref 0–0.2)
BASOPHILS NFR BLD: 1 % (ref 0–2)
BUN SERPL-MCNC: 7 MG/DL (ref 8–23)
CALCIUM SERPL-MCNC: 8.1 MG/DL (ref 8.6–10.4)
CHLORIDE SERPL-SCNC: 107 MMOL/L (ref 98–107)
CHOLEST SERPL-MCNC: 161 MG/DL
CHOLESTEROL/HDL RATIO: 3.4
CO2 SERPL-SCNC: 25 MMOL/L (ref 20–31)
CREAT SERPL-MCNC: 0.64 MG/DL (ref 0.5–0.9)
EKG ATRIAL RATE: 70 BPM
EKG P AXIS: 27 DEGREES
EKG P-R INTERVAL: 132 MS
EKG Q-T INTERVAL: 394 MS
EKG QRS DURATION: 68 MS
EKG QTC CALCULATION (BAZETT): 425 MS
EKG R AXIS: 7 DEGREES
EKG T AXIS: 23 DEGREES
EKG VENTRICULAR RATE: 70 BPM
EOSINOPHIL # BLD: 0.1 K/UL (ref 0–0.4)
EOSINOPHILS RELATIVE PERCENT: 2 % (ref 0–4)
ERYTHROCYTE [DISTWIDTH] IN BLOOD BY AUTOMATED COUNT: 13.1 % (ref 11.5–14.9)
EST. AVERAGE GLUCOSE BLD GHB EST-MCNC: 103 MG/DL
GFR SERPL CREATININE-BSD FRML MDRD: >60 ML/MIN/1.73M2
GLUCOSE SERPL-MCNC: 108 MG/DL (ref 70–99)
HBA1C MFR BLD: 5.2 % (ref 4–6)
HCT VFR BLD AUTO: 34.4 % (ref 36–46)
HDLC SERPL-MCNC: 48 MG/DL
HGB BLD-MCNC: 12.2 G/DL (ref 12–16)
LDLC SERPL CALC-MCNC: 100 MG/DL (ref 0–130)
LV EF: 63 %
LVEF MODALITY: NORMAL
LYMPHOCYTES # BLD: 26 % (ref 24–44)
LYMPHOCYTES NFR BLD: 1.2 K/UL (ref 1–4.8)
MAGNESIUM SERPL-MCNC: 2.1 MG/DL (ref 1.6–2.6)
MCH RBC QN AUTO: 32.8 PG (ref 26–34)
MCHC RBC AUTO-ENTMCNC: 35.4 G/DL (ref 31–37)
MCV RBC AUTO: 92.7 FL (ref 80–100)
MONOCYTES NFR BLD: 0.5 K/UL (ref 0.1–1.3)
MONOCYTES NFR BLD: 11 % (ref 1–7)
NEUTROPHILS NFR BLD: 60 % (ref 36–66)
NEUTS SEG NFR BLD: 2.7 K/UL (ref 1.3–9.1)
PLATELET # BLD AUTO: 182 K/UL (ref 150–450)
PMV BLD AUTO: 8.9 FL (ref 6–12)
POTASSIUM SERPL-SCNC: 3.5 MMOL/L (ref 3.7–5.3)
RBC # BLD AUTO: 3.71 M/UL (ref 4–5.2)
SODIUM SERPL-SCNC: 139 MMOL/L (ref 135–144)
TRIGL SERPL-MCNC: 67 MG/DL
WBC OTHER # BLD: 4.5 K/UL (ref 3.5–11)

## 2023-05-22 PROCEDURE — 2060000000 HC ICU INTERMEDIATE R&B

## 2023-05-22 PROCEDURE — 97166 OT EVAL MOD COMPLEX 45 MIN: CPT

## 2023-05-22 PROCEDURE — 6370000000 HC RX 637 (ALT 250 FOR IP): Performed by: NURSE PRACTITIONER

## 2023-05-22 PROCEDURE — 36415 COLL VENOUS BLD VENIPUNCTURE: CPT

## 2023-05-22 PROCEDURE — 97535 SELF CARE MNGMENT TRAINING: CPT

## 2023-05-22 PROCEDURE — 97162 PT EVAL MOD COMPLEX 30 MIN: CPT

## 2023-05-22 PROCEDURE — 6360000002 HC RX W HCPCS: Performed by: NURSE PRACTITIONER

## 2023-05-22 PROCEDURE — 6370000000 HC RX 637 (ALT 250 FOR IP): Performed by: INTERNAL MEDICINE

## 2023-05-22 PROCEDURE — 83036 HEMOGLOBIN GLYCOSYLATED A1C: CPT

## 2023-05-22 PROCEDURE — 97116 GAIT TRAINING THERAPY: CPT

## 2023-05-22 PROCEDURE — 70551 MRI BRAIN STEM W/O DYE: CPT

## 2023-05-22 PROCEDURE — 99223 1ST HOSP IP/OBS HIGH 75: CPT | Performed by: INTERNAL MEDICINE

## 2023-05-22 PROCEDURE — 93306 TTE W/DOPPLER COMPLETE: CPT

## 2023-05-22 PROCEDURE — 80048 BASIC METABOLIC PNL TOTAL CA: CPT

## 2023-05-22 PROCEDURE — 80061 LIPID PANEL: CPT

## 2023-05-22 PROCEDURE — 85025 COMPLETE CBC W/AUTO DIFF WBC: CPT

## 2023-05-22 PROCEDURE — 83735 ASSAY OF MAGNESIUM: CPT

## 2023-05-22 PROCEDURE — 2580000003 HC RX 258: Performed by: NURSE PRACTITIONER

## 2023-05-22 RX ORDER — POTASSIUM CHLORIDE 7.45 MG/ML
10 INJECTION INTRAVENOUS PRN
Status: DISCONTINUED | OUTPATIENT
Start: 2023-05-22 | End: 2023-05-23 | Stop reason: HOSPADM

## 2023-05-22 RX ORDER — AMLODIPINE BESYLATE 5 MG/1
5 TABLET ORAL DAILY
Status: DISCONTINUED | OUTPATIENT
Start: 2023-05-22 | End: 2023-05-23 | Stop reason: HOSPADM

## 2023-05-22 RX ORDER — HYDRALAZINE HYDROCHLORIDE 20 MG/ML
20 INJECTION INTRAMUSCULAR; INTRAVENOUS EVERY 4 HOURS PRN
Status: DISCONTINUED | OUTPATIENT
Start: 2023-05-22 | End: 2023-05-23 | Stop reason: HOSPADM

## 2023-05-22 RX ORDER — POTASSIUM CHLORIDE 20 MEQ/1
40 TABLET, EXTENDED RELEASE ORAL PRN
Status: DISCONTINUED | OUTPATIENT
Start: 2023-05-22 | End: 2023-05-23 | Stop reason: HOSPADM

## 2023-05-22 RX ORDER — SPIRONOLACTONE 25 MG/1
25 TABLET ORAL DAILY
Status: DISCONTINUED | OUTPATIENT
Start: 2023-05-22 | End: 2023-05-23 | Stop reason: HOSPADM

## 2023-05-22 RX ADMIN — PANTOPRAZOLE SODIUM 40 MG: 40 TABLET, DELAYED RELEASE ORAL at 17:16

## 2023-05-22 RX ADMIN — SODIUM CHLORIDE, PRESERVATIVE FREE 10 ML: 5 INJECTION INTRAVENOUS at 20:45

## 2023-05-22 RX ADMIN — BUTALBITA,ACETAMINOPHEN AND CAFFEINE 1 CAPSULE: 50; 300; 40 CAPSULE ORAL at 14:39

## 2023-05-22 RX ADMIN — ATORVASTATIN CALCIUM 40 MG: 40 TABLET, FILM COATED ORAL at 08:28

## 2023-05-22 RX ADMIN — POTASSIUM CHLORIDE 40 MEQ: 1500 TABLET, EXTENDED RELEASE ORAL at 08:29

## 2023-05-22 RX ADMIN — ENOXAPARIN SODIUM 40 MG: 100 INJECTION SUBCUTANEOUS at 08:29

## 2023-05-22 RX ADMIN — BUTALBITA,ACETAMINOPHEN AND CAFFEINE 1 CAPSULE: 50; 300; 40 CAPSULE ORAL at 06:24

## 2023-05-22 RX ADMIN — DICLOFENAC SODIUM 2 G: 10 GEL TOPICAL at 20:45

## 2023-05-22 RX ADMIN — LISINOPRIL 40 MG: 20 TABLET ORAL at 08:29

## 2023-05-22 RX ADMIN — AMLODIPINE BESYLATE 5 MG: 5 TABLET ORAL at 17:16

## 2023-05-22 RX ADMIN — Medication 1000 UNITS: at 08:30

## 2023-05-22 RX ADMIN — CEFTRIAXONE SODIUM 1000 MG: 1 INJECTION, POWDER, FOR SOLUTION INTRAMUSCULAR; INTRAVENOUS at 03:17

## 2023-05-22 RX ADMIN — SPIRONOLACTONE 25 MG: 25 TABLET ORAL at 17:16

## 2023-05-22 RX ADMIN — ERGOCALCIFEROL 50000 UNITS: 1.25 CAPSULE ORAL at 08:28

## 2023-05-22 RX ADMIN — SODIUM CHLORIDE, PRESERVATIVE FREE 10 ML: 5 INJECTION INTRAVENOUS at 08:32

## 2023-05-22 ASSESSMENT — PAIN DESCRIPTION - PAIN TYPE: TYPE: ACUTE PAIN

## 2023-05-22 ASSESSMENT — PAIN DESCRIPTION - LOCATION
LOCATION: HEAD

## 2023-05-22 ASSESSMENT — PAIN SCALES - GENERAL
PAINLEVEL_OUTOF10: 6
PAINLEVEL_OUTOF10: 5
PAINLEVEL_OUTOF10: 3
PAINLEVEL_OUTOF10: 0

## 2023-05-22 ASSESSMENT — PAIN DESCRIPTION - DESCRIPTORS
DESCRIPTORS: ACHING
DESCRIPTORS: ACHING

## 2023-05-22 NOTE — ED NOTES
Report given to Corey Rogers RN from [facility]. Report method by phone   The following was reviewed with receiving RN:   Current vital signs:  BP (!) 171/83   Pulse 67   Temp 98.6 °F (37 °C) (Oral)   Resp 15   Ht 5' 2\" (1.575 m)   Wt 148 lb (67.1 kg)   SpO2 94%   BMI 27.07 kg/m²                MEWS Score: 1     Any medication or safety alerts were reviewed. Any pending diagnostics and notifications were also reviewed, as well as any safety concerns or issues, abnormal labs, abnormal imaging, and abnormal assessment findings. Questions were answered.          Fernando Salgado RN  05/21/23 8979

## 2023-05-22 NOTE — H&P
[address] [facility] Internal Medicine  Max Haynes MD; Rohan Layton MD; Stanley Lamb MD; MD Ghulam Guy MD; MD REILLY Rodrigues [facility] Internal Medicine   [facility]    HISTORY AND PHYSICAL EXAMINATION            Date:   5/22/2023  Patient name:  Iraida Lawson  Date of admission:  5/21/2023  3:51 PM  MRN:   [id number]  Account:  [de-identified]  YOB: 1950  PCP:    Monae Figueroa MD  Room:   2091/2091-01  Code Status:    Full Code    Chief Complaint:     Chief Complaint   Patient presents with    Altered Mental Status     Family relate that she could not remember the dogs name L side HA and neck pain started suddenly with vision changes        History Obtained From:     patient, electronic medical record    History of Present Illness:     Iraida Lawson is a 74 y.o.  /  female who presents with Altered Mental Status (Family relate that she could not remember the dogs name L side HA and neck pain started suddenly with vision changes )   and is admitted to the hospital for the management of AMS (altered mental status).     77-year-old female was brought to the emergency room with markedly elevated blood pressure  And confusion altered mental status  Maximal blood pressure was 215/85  Pain patient did not take her medications on the day of admission  With blood pressure medication controlled her mentation has returned to normal  She has no neurological deficit  Her labs look okay  She is afebrile    In my opinion she had a hypertensive encephalopathy  Which has resolved  MRI scan showed no acute changes      Past Medical History:     Past Medical History:   Diagnosis Date    Arthritis     spine    COVID 11/08/2021    Gallstones     Hearing loss     Hx of bronchitis     Hyperlipidemia     Hypertension     Seasonal allergies     Spondylolisthesis at L5-S1 level     s/p cage and fusion    Wellness examination

## 2023-05-22 NOTE — ACP (ADVANCE CARE PLANNING)
Advance Care Planning     Advance Care Planning Activator (Inpatient)  Conversation Note      Date of ACP Conversation: 5/22/2023     Conversation Conducted with: Patient with Decision Making Capacity    ACP Activator: Alpa Weiss RN        Health Care Decision Maker:     Current Designated Health Care Decision Maker:     Primary Decision Maker: Benny lorenzo - Child - [phone]    Primary Decision Maker: Dixie Lambert - Child - [phone]    Primary Decision Maker: Simeon Henderson - Child - [phone]        Care Preferences    Ventilation: \"If you were in your present state of health and suddenly became very ill and were unable to breathe on your own, what would your preference be about the use of a ventilator (breathing machine) if it were available to you? \"      Would the patient desire the use of ventilator (breathing machine)?: yes    \"If your health worsens and it becomes clear that your chance of recovery is unlikely, what would your preference be about the use of a ventilator (breathing machine) if it were available to you? \"     Would the patient desire the use of ventilator (breathing machine)?: No      Resuscitation  \"CPR works best to restart the heart when there is a sudden event, like a heart attack, in someone who is otherwise healthy. Unfortunately, CPR does not typically restart the heart for people who have serious health conditions or who are very sick. \"    \"In the event your heart stopped as a result of an underlying serious health condition, would you want attempts to be made to restart your heart (answer \"yes\" for attempt to resuscitate) or would you prefer a natural death (answer \"no\" for do not attempt to resuscitate)? \" yes       [] Yes   [] No   Educated Patient / Jaden Adames regarding differences between Advance Directives and portable DNR orders.     Length of ACP Conversation in minutes:      Conversation Outcomes:  ACP discussion completed    Follow-up plan:    [] Schedule

## 2023-05-22 NOTE — VIRTUAL HEALTH
[address] [facility] Stroke and Vascular Neurology Consult for  [facility] Stroke Alert through [address] @ 17:02  5/21/2023 6:46 PM    Pt Name: Giacomo An  MRN: [id number]  YOB: 1950  Date of evaluation: 5/21/2023  Primary Care Physician: Bing West MD  Reason for Evaluation: Stroke Evaluation with Discussion with Ed or primary team with Telemedicine and stroke evaluation with Review of imaging and labs    Giacomo An is a 74 y.o. female who presents with history of HLD HTN and last for around 1505 p.m. She developed left-sided neck pain and headache with blurry vision and difficulty remember her dog's name with noted encephalopathy. Presented to the ED for further evaluation NIH of 0. No iv TNK    LKW: 15:50  NIH:  0    Allergies  is allergic to influenza vaccines, bee venom, other, and penicillins. Medications  Prior to Admission medications    Medication Sig Start Date End Date Taking?  Authorizing Provider   lisinopril (PRINIVIL;ZESTRIL) 40 MG tablet TAKE 1 TABLET BY MOUTH EVERY DAY 4/6/23  Yes Bing West MD   vitamin D (CHOLECALCIFEROL) 25 MCG (1000 UT) TABS tablet Take 1 tablet by mouth daily 1/19/23 5/21/23 Yes Bing West MD   omeprazole (PRILOSEC) 40 MG delayed release capsule TAKE 1 CAPSULE BY MOUTH DAILY 1 HOUR BEFORE DINNER 1/4/23  Yes Historical Provider, MD   atorvastatin (LIPITOR) 40 MG tablet Take 1 tablet by mouth every day 12/22/22  Yes Bianka Beckett, APRN - CNP   albuterol sulfate  (90 Base) MCG/ACT inhaler Inhale 2 puffs into the lungs 4 times daily as needed for Wheezing 11/2/21  Yes Bing West MD   Cholecalciferol 1.25 MG (82122 UT) TABS Take 1 tablet by mouth once a week  Patient taking differently: Take 1 tablet by mouth once a week mondays 1/13/23 4/13/23  Bing West MD   Risedronate Sodium 150 MG TABS TAKE 1 TABLET BY MOUTH EVERY 30 DAYS  Patient taking differently: Take 1 tablet by mouth every 30 days Takes on the 9th of

## 2023-05-22 NOTE — CARE COORDINATION
Case Management Assessment  Initial Evaluation    Date/Time of Evaluation: 5/22/2023 12:16 PM  Assessment Completed by: Rian Varma RN    If patient is discharged prior to next notation, then this note serves as note for discharge by case management. Patient Name: Bello Tate                   YOB: 1950  Diagnosis: Transient alteration of awareness [R40.4]  Hypertensive urgency [I16.0]  Acute nonintractable headache, unspecified headache type [R51.9]  AMS (altered mental status) [R41.82]                   Date / Time: 5/21/2023  3:51 PM    Patient Admission Status: Inpatient   Readmission Risk (Low < 19, Mod (19-27), High > 27): Readmission Risk Score: 6.4    Current PCP: Jasper Rubio MD  PCP verified by CM? Yes    Chart Reviewed: Yes      History Provided by: Patient  Patient Orientation: Alert and Oriented    Patient Cognition: Alert    Hospitalization in the last 30 days (Readmission):  No    If yes, Readmission Assessment in CM Navigator will be completed. Advance Directives:      Code Status: Full Code   Patient's Primary Decision Maker is:      Primary Decision Maker: Cantu,Cassie - Child - [phone]    Primary Decision Maker: Manav Irma - Child - [phone]    Primary Decision Maker: Aarti  - Child - [phone]    Discharge Planning:    Patient lives with: Other (Comment) (Roommate) Type of Home: House  Primary Care Giver: Self  Patient Support Systems include: Children, Family Members   Current Financial resources: Medicare  Current community resources: None  Current services prior to admission: None            Current DME:              Type of Home Care services:  None    ADLS  Prior functional level: Independent in ADLs/IADLs  Current functional level: Independent in ADLs/IADLs    PT AM-PAC: 18 /24  OT AM-PAC: 18 /24    Family can provide assistance at DC:  Yes  Would you like Case Management to discuss the discharge plan with any other family

## 2023-05-22 NOTE — CARE COORDINATION
Patient and/or primary caregiver participated in post-hospital care planning and their ability to address care needs was assessed. Yes    Family/caregiver is willing and able to care for patient at home. Yes    Family/caregiver educated on resources available including durable medical equipment and respite care. Yes    Pt. Had Multiple family members in the room at the time of discussion. They can assist w/ any needs, that the pt. May have. Denies Services at this time.

## 2023-05-23 VITALS
OXYGEN SATURATION: 97 % | HEART RATE: 70 BPM | TEMPERATURE: 97.3 F | DIASTOLIC BLOOD PRESSURE: 66 MMHG | RESPIRATION RATE: 17 BRPM | SYSTOLIC BLOOD PRESSURE: 133 MMHG | WEIGHT: 149.91 LBS | HEIGHT: 62 IN | BODY MASS INDEX: 27.59 KG/M2

## 2023-05-23 LAB
ANION GAP SERPL CALCULATED.3IONS-SCNC: 11 MMOL/L (ref 9–17)
BASOPHILS # BLD: 0 K/UL (ref 0–0.2)
BASOPHILS NFR BLD: 1 % (ref 0–2)
BUN SERPL-MCNC: 10 MG/DL (ref 8–23)
CALCIUM SERPL-MCNC: 8.2 MG/DL (ref 8.6–10.4)
CHLORIDE SERPL-SCNC: 106 MMOL/L (ref 98–107)
CO2 SERPL-SCNC: 21 MMOL/L (ref 20–31)
CREAT SERPL-MCNC: 0.74 MG/DL (ref 0.5–0.9)
EOSINOPHIL # BLD: 0.1 K/UL (ref 0–0.4)
EOSINOPHILS RELATIVE PERCENT: 2 % (ref 0–4)
ERYTHROCYTE [DISTWIDTH] IN BLOOD BY AUTOMATED COUNT: 13.5 % (ref 11.5–14.9)
GFR SERPL CREATININE-BSD FRML MDRD: >60 ML/MIN/1.73M2
GLUCOSE SERPL-MCNC: 111 MG/DL (ref 70–99)
HCT VFR BLD AUTO: 34.9 % (ref 36–46)
HGB BLD-MCNC: 12.2 G/DL (ref 12–16)
LYMPHOCYTES # BLD: 33 % (ref 24–44)
LYMPHOCYTES NFR BLD: 1.3 K/UL (ref 1–4.8)
MCH RBC QN AUTO: 32.9 PG (ref 26–34)
MCHC RBC AUTO-ENTMCNC: 35 G/DL (ref 31–37)
MCV RBC AUTO: 94.2 FL (ref 80–100)
MICROORGANISM SPEC CULT: NORMAL
MONOCYTES NFR BLD: 0.5 K/UL (ref 0.1–1.3)
MONOCYTES NFR BLD: 12 % (ref 1–7)
NEUTROPHILS NFR BLD: 52 % (ref 36–66)
NEUTS SEG NFR BLD: 2.2 K/UL (ref 1.3–9.1)
PLATELET # BLD AUTO: 178 K/UL (ref 150–450)
PMV BLD AUTO: 9 FL (ref 6–12)
POTASSIUM SERPL-SCNC: 4.1 MMOL/L (ref 3.7–5.3)
RBC # BLD AUTO: 3.7 M/UL (ref 4–5.2)
SODIUM SERPL-SCNC: 138 MMOL/L (ref 135–144)
SPECIMEN DESCRIPTION: NORMAL
WBC OTHER # BLD: 4.1 K/UL (ref 3.5–11)

## 2023-05-23 PROCEDURE — 80048 BASIC METABOLIC PNL TOTAL CA: CPT

## 2023-05-23 PROCEDURE — 99222 1ST HOSP IP/OBS MODERATE 55: CPT | Performed by: PSYCHIATRY & NEUROLOGY

## 2023-05-23 PROCEDURE — 36415 COLL VENOUS BLD VENIPUNCTURE: CPT

## 2023-05-23 PROCEDURE — 6370000000 HC RX 637 (ALT 250 FOR IP): Performed by: INTERNAL MEDICINE

## 2023-05-23 PROCEDURE — 2580000003 HC RX 258: Performed by: NURSE PRACTITIONER

## 2023-05-23 PROCEDURE — 99232 SBSQ HOSP IP/OBS MODERATE 35: CPT | Performed by: INTERNAL MEDICINE

## 2023-05-23 PROCEDURE — 6360000002 HC RX W HCPCS: Performed by: NURSE PRACTITIONER

## 2023-05-23 PROCEDURE — 85025 COMPLETE CBC W/AUTO DIFF WBC: CPT

## 2023-05-23 PROCEDURE — 6370000000 HC RX 637 (ALT 250 FOR IP): Performed by: NURSE PRACTITIONER

## 2023-05-23 RX ORDER — RISEDRONATE SODIUM 150 MG/1
1 TABLET, FILM COATED ORAL
Qty: 30 TABLET | Refills: 0 | Status: SHIPPED | OUTPATIENT
Start: 2023-05-23

## 2023-05-23 RX ORDER — SPIRONOLACTONE 25 MG/1
25 TABLET ORAL DAILY
Qty: 30 TABLET | Refills: 3 | Status: SHIPPED | OUTPATIENT
Start: 2023-05-24

## 2023-05-23 RX ORDER — AMLODIPINE BESYLATE 5 MG/1
5 TABLET ORAL DAILY
Qty: 30 TABLET | Refills: 3 | Status: SHIPPED | OUTPATIENT
Start: 2023-05-24

## 2023-05-23 RX ADMIN — CEFTRIAXONE SODIUM 1000 MG: 1 INJECTION, POWDER, FOR SOLUTION INTRAMUSCULAR; INTRAVENOUS at 03:05

## 2023-05-23 RX ADMIN — ENOXAPARIN SODIUM 40 MG: 100 INJECTION SUBCUTANEOUS at 08:14

## 2023-05-23 RX ADMIN — LISINOPRIL 40 MG: 20 TABLET ORAL at 08:12

## 2023-05-23 RX ADMIN — ATORVASTATIN CALCIUM 40 MG: 40 TABLET, FILM COATED ORAL at 08:13

## 2023-05-23 RX ADMIN — Medication 1000 UNITS: at 08:13

## 2023-05-23 RX ADMIN — BUTALBITA,ACETAMINOPHEN AND CAFFEINE 1 CAPSULE: 50; 300; 40 CAPSULE ORAL at 08:13

## 2023-05-23 RX ADMIN — SODIUM CHLORIDE, PRESERVATIVE FREE 10 ML: 5 INJECTION INTRAVENOUS at 08:47

## 2023-05-23 RX ADMIN — SPIRONOLACTONE 25 MG: 25 TABLET ORAL at 08:13

## 2023-05-23 RX ADMIN — AMLODIPINE BESYLATE 5 MG: 5 TABLET ORAL at 08:13

## 2023-05-23 NOTE — PLAN OF CARE
Problem: Discharge Planning  Goal: Discharge to home or other facility with appropriate resources  5/23/2023 1515 by Kuldip Saunders RN  Outcome: Completed  5/23/2023 0345 by Shaun Henriquez RN  Outcome: Progressing     Problem: Pain  Goal: Verbalizes/displays adequate comfort level or baseline comfort level  5/23/2023 1515 by Kuldip Saunders RN  Outcome: Completed  5/23/2023 0345 by Shaun Henriquez RN  Outcome: Progressing  Note: States headache pain has resolved. Problem: Neurosensory - Adult  Goal: Achieves stable or improved neurological status  5/23/2023 1515 by Kuldip Saunders RN  Outcome: Completed  5/23/2023 0345 by Shaun Henriquez RN  Outcome: Progressing  Note: CVA ruled out. No neuro deficits.   Goal: Achieves maximal functionality and self care  Outcome: Completed     Problem: ABCDS Injury Assessment  Goal: Absence of physical injury  5/23/2023 1515 by Kuldip Saunders RN  Outcome: Completed  5/23/2023 0345 by Shaun Henriquez RN  Outcome: Progressing
Problem: Discharge Planning  Goal: Discharge to home or other facility with appropriate resources  Outcome: Progressing  Flowsheets (Taken 5/22/2023 9233)  Discharge to home or other facility with appropriate resources:   Identify barriers to discharge with patient and caregiver   Arrange for needed discharge resources and transportation as appropriate   Identify discharge learning needs (meds, wound care, etc)   Arrange for interpreters to assist at discharge as needed   Refer to discharge planning if patient needs post-hospital services based on physician order or complex needs related to functional status, cognitive ability or social support system     Problem: Pain  Goal: Verbalizes/displays adequate comfort level or baseline comfort level  5/22/2023 1540 by Miguel Fierro RN  Outcome: Progressing  5/22/2023 0245 by Patricia Grijalva RN  Outcome: Progressing  Note: Patient complains of headache. Given fiorecet for pain. States headache pain improved after med given. Problem: Neurosensory - Adult  Goal: Achieves stable or improved neurological status  5/22/2023 1540 by Miguel Fierro RN  Outcome: Progressing  Flowsheets (Taken 5/22/2023 8662)  Achieves stable or improved neurological status:   Assess for and report changes in neurological status   Initiate measures to prevent increased intracranial pressure   Maintain blood pressure and fluid volume within ordered parameters to optimize cerebral perfusion and minimize risk of hemorrhage   Monitor temperature, glucose, and sodium. Initiate appropriate interventions as ordered  5/22/2023 0245 by Patricia Grijalva RN  Outcome: Progressing  Note: Neuro checks done. No deficits. Complains of headache improved after fioricet BP improved after hydralazine and lisinopril given.   Goal: Achieves maximal functionality and self care  Outcome: Progressing  Flowsheets (Taken 5/22/2023 0896)  Achieves maximal functionality and self care:   Monitor swallowing and
Problem: Pain  Goal: Verbalizes/displays adequate comfort level or baseline comfort level  Outcome: Progressing  Note: Patient complains of headache. Given fiorecet for pain. States headache pain improved after med given. Problem: Neurosensory - Adult  Goal: Achieves stable or improved neurological status  Outcome: Progressing  Note: Neuro checks done. No deficits. Complains of headache improved after fioricet BP improved after hydralazine and lisinopril given.
patient fatigue and changes in neurological status   Encourage and assist patient to increase activity and self care with guidance from physical therapy/occupational therapy   Encourage visually impaired, hearing impaired and aphasic patients to use assistive/communication devices     Problem: ABCDS Injury Assessment  Goal: Absence of physical injury  5/23/2023 0345 by Purvi Milan RN  Outcome: Progressing  5/22/2023 1540 by Cody Hernandez RN  Outcome: Progressing
The patient is a 60y Female complaining of rash.

## 2023-05-23 NOTE — CONSULTS
Date:   5/22/2023  Patient name: Wisam Fraga  Date of admission:  5/21/2023  3:51 PM  MRN:   [id number]  YOB: 1950  PCP: Christos Avalos MD    Reason for Admission: Transient alteration of awareness [R40.4]  Hypertensive urgency [I16.0]  Acute nonintractable headache, unspecified headache type [R51.9]  AMS (altered mental status) [R41.82]    Cardiology consult: Elevated hypertension       Referring physician: Dr Jesica Ruiz    Admission 5/21/2023 altered mental status, elevated blood pressure 215/85  Negative CT head and negative CTA head and neck  History of COVID infection  Hyperlipidemia  Hypertension    No previous history of coronary intervention    Past surgical history  Cholecystectomy laparoscopic hysterectomy, eye surgery  Nasal sinus surgery  Colonoscopies  Back surgery      Investigation work-up    ECG 5/21/2023  Normal sinus rhythm heart rate 70 normal ECG    2D echo 5/22/2023  Normal LV size, ejection fraction 60 to 65%  Moderate LVH  Negative bubble study  Mild mitral and tricuspid regurgitation, normal RVSP  No significant pericardial effusion seen    CTA head and neck 5/21/2023  No large vessel occlusion in the head or neck  Bilateral internal carotid artery fibromuscular dysplasia    CT head without contrast 5/21/2023  No acute intracranial abnormality  Evidence of prior bilateral maxillary antrectomies with chronic pansinusitis    Chest x-ray 5/21/2023  No acute process    History of present illness    49-year-old female, her son lives with her who has past medical history of hypertension, hyperlipidemia developed sudden onset of vision, generalized weakness, mild frontal headache but no nausea vomiting. No chest pain no palpitation. Her blood pressure was elevated at 215/85 in the ER. Normally she is independent in her activities of daily living and drives car. No previous history of coronary intervention.   No TIA or CVA  CT head was unremarkable, CTA head and
acute strokes or high-grade stenosis or large vessel occlusions. MRI of the brain performed yesterday demonstrated no strokes. Patient was noted to be hypokalemic and potassium has been corrected. She received hydration. This morning she feels that she is completely back to normal.    Patient does receive antihypertensive medications. We have her blood pressures show that there is been a low of 126/71 with a maximum of 154/66. Dr. Jolly Nino note from 5/22/2023 at 2:20 PM indicated maximum blood pressure of 215/85. It is possible that the patient had rebound hypertension following a hypotensive event which I think was the precipitating issue based on what the patient has mentioned to me at this time.       Past Medical History:     Past Medical History:   Diagnosis Date    Arthritis     spine    COVID 11/08/2021    Gallstones     Hearing loss     Hx of bronchitis     Hyperlipidemia     Hypertension     Seasonal allergies     Spondylolisthesis at L5-S1 level     s/p cage and fusion    Wellness examination 07/14/2021    pcp-Dr Brent Doan visit june 2021        Past Surgical History:     Past Surgical History:   Procedure Laterality Date    CHOLECYSTECTOMY, LAPAROSCOPIC  07/21/2021    XI ROBOTIC LAPAROSCOPIC CHOLECYSTECTOMY    CHOLECYSTECTOMY, LAPAROSCOPIC N/A 07/21/2021    XI ROBOTIC LAPAROSCOPIC CHOLECYSTECTOMY, performed by Bijan Coker DO at [address] [facility] N/A 11/21/2020    COLONOSCOPY POLYPECTOMY SNARE/COLD BIOPSY, HOT BIOPSY SNARE AND PHOTOS performed by Bella Crandall MD at [address] Bilateral     cataract removal    HYSTERECTOMY (CERVIX STATUS UNKNOWN)      LARYNGOSCOPY N/A 4/17/2023    LARYNGOSCOPY MICRO DIRECT performed by Alejo Delgadillo MD at [address] [facility]       L5-S1 fusion with cage    NASAL SINUS SURGERY      SINUS ENDOSCOPY Bilateral 4/17/2023    SINUS ENDOSCOPY FUNCTIONAL SURGERY WITH NAVIGATION   ETHOMIDECTOMY, MAX ANTROSTOMY, FRONTAL

## 2023-05-23 NOTE — DISCHARGE INSTRUCTIONS
You were diagnosed with hypertensive emergency  Please take medications every day  Please avoid smoking and alcohol  Please follow up with cardio and neuro  Please come to hospital if symptoms worsen or persist  Please avoid caffeine

## 2023-05-23 NOTE — CARE COORDINATION
ONGOING DISCHARGE PLAN:    Patient is alert and oriented x4. Spoke with patient regarding discharge plan and patient confirms that plan is to DC to her Son's Home, for short term. Denies VNS. PT/OT on board. Neuro has signed off. Per Previous Cardio notes- OK to DC if BP improved. Denies Needs. Will continue to follow for additional discharge needs. If patient is discharged prior to next notation, then this note serves as note for discharge by case management.     Electronically signed by Ciara Reynolds RN on 5/23/2023 at 11:06 AM

## 2023-05-24 ENCOUNTER — CARE COORDINATION (OUTPATIENT)
Dept: CASE MANAGEMENT | Age: 73
End: 2023-05-24

## 2023-05-24 DIAGNOSIS — R41.82 ALTERED MENTAL STATUS, UNSPECIFIED ALTERED MENTAL STATUS TYPE: Primary | ICD-10-CM

## 2023-05-24 PROCEDURE — 1111F DSCHRG MED/CURRENT MED MERGE: CPT | Performed by: FAMILY MEDICINE

## 2023-05-24 NOTE — CARE COORDINATION
[address] Care Transitions Initial Follow Up Call    Call within 2 business days of discharge: Yes    Patient Current Location:  Home: [address] [place] [address] contacted the patient by telephone to perform post hospital discharge assessment. Verified name and  with patient as identifiers. Provided introduction to self, and explanation of the LPN Care Coordinator role. Patient: Matilde Pendleton Patient : 1950   MRN: [id number]  Reason for Admission: Altered Mental Status  Discharge Date: 23 RARS: Readmission Risk Score: 6.4      Last Discharge  [address]       Date Complaint Diagnosis Description Type Department Provider    23 Altered Mental Status Acute nonintractable headache, unspecified headache type . .. ED to Hosp-Admission (Discharged) (ADMITTED)  MADELYN Kim MD; [facility]. .. Was this an external facility discharge? No Discharge Facility: [address] [place] [address] to be reviewed by the provider   Additional needs identified to be addressed with provider: No  none               Method of communication with provider: none. Writer spoke with Lou Castillo today . Lou Castillo was admitted to [facility] [address] - for altered mental status. Pt could not remember her dog name and complained of L side HA and neck pain that started suddenly with vision changes. Pt was found to have elevated /85. Pt had not taken her medication the day of admission. No neurological deficit found, MRI showed no acute changes. Writer spoke with Lou Jonathan today . Reports that she is feeling a lot better has a slight HA. Denies new vision issues pain, confusion or balance issues. Pt does not use assistance device to ambulate. Pt reports her appetite is good and that her B/B is good. Writer discussed medications 111F was done pt will be picking up her Amlodipine and Spironolactone today. Reports the pharmacy told her it will be ready today at noon.  Writer discussed  the

## 2023-05-24 NOTE — PROGRESS NOTES
05/23/23 1230   Encounter Summary   Encounter Overview/Reason   Encounter   Service Provided For: Patient   Referral/Consult From: Rounding   Last Encounter  05/23/23   Complexity of Encounter Low   Spiritual/Emotional needs   Type Spiritual Support   Rituals, Rites and Sacraments   Type Anointing  ( 5/23/23)
Date:   5/23/2023  Patient name: Kaya Arreaga  Date of admission:  5/21/2023  3:51 PM  MRN:   [id number]  YOB: 1950  PCP: Bren Bashir MD    Reason for Admission: Transient alteration of awareness [R40.4]  Hypertensive urgency [I16.0]  Acute nonintractable headache, unspecified headache type [R51.9]  AMS (altered mental status) [R41.82]    Cardiology consult: Uncontrolled hypertension       Referring physician: Dr Key Garcia     Admission 5/21/2023 altered mental status, elevated blood pressure 215/85  Significant improvement in blood pressure by adding amlodipine and Aldactone  Negative CT head and negative CTA head and neck  History of COVID infection  Hyperlipidemia  Hypertension  Recent sinus surgery     No previous history of coronary intervention     Past surgical history  Cholecystectomy laparoscopic hysterectomy, eye surgery  Nasal sinus surgery  Colonoscopies  Back surgery        Investigation work-up     ECG 5/21/2023  Normal sinus rhythm heart rate 70 normal ECG     2D echo 5/22/2023  Normal LV size, ejection fraction 60 to 65%  Moderate LVH  Negative bubble study  Mild mitral and tricuspid regurgitation, normal RVSP  No significant pericardial effusion seen     CTA head and neck 5/21/2023  No large vessel occlusion in the head or neck  Bilateral internal carotid artery fibromuscular dysplasia     CT head without contrast 5/21/2023  No acute intracranial abnormality  Evidence of prior bilateral maxillary antrectomies with chronic pansinusitis    Chest x-ray 5/21/2023  No acute process     History of present illness     73-year-old female, her son lives with her who has past medical history of hypertension, hyperlipidemia developed sudden onset of vision, generalized weakness, mild frontal headache but no nausea vomiting. No chest pain no palpitation. Her blood pressure was elevated at 215/85 in the ER.   Normally she is independent in her activities of daily living and
Dr. Castelan Force read [employer] message for consult.
Mary Brito notified of elevated BP and headache through [employer].
Patient discharged home. A&O x's 4, VS WNL, daughter and grandson at bedside. Patient ambulatory, walk to car with daughter & grandson.
Physician Progress Note      Bob Palmer  CSN #:                  [id number]  :                       1950  ADMIT DATE:       2023 3:51 PM  [address] [place] DATE:        2023 3:45 PM  RESPONDING  PROVIDER #:        Armond Dubin          QUERY TEXT:    TAMMIE MADDOX [facility]    Pt admitted with AMS, HTN encephalopathy. Per Cardio : Transient   alteration of awareness. If possible, please document in progress notes and   discharge summary:    The medical record reflects the following:  Risk Factors: HTN Urgency, HTN Encephalopathy, Acute nonintractable headache  Clinical Indicators:  cardio consult: Transient alteration of awareness  Treatment: hospital admission    Judi OLVERAN, RN  Options provided:  -- Transient alteration of awareness confirmed present on admission  -- Transient alteration of awareness ruled out  -- Other - I will add my own diagnosis  -- Disagree - Not applicable / Not valid  -- Disagree - Clinically unable to determine / Unknown  -- Refer to Clinical Documentation Reviewer    PROVIDER RESPONSE TEXT:    The diagnosis of Transient alteration of awareness was confirmed as present on   admission.     Query created by: Lion Renner on 2023 7:54 AM      Electronically signed by:  Armond Dubin 2023 11:10 AM
[facility] ordered meds for headache and hypertension. Meds given as ordered. Will monitor BP.
Number of Steps: 3 or 4  Entrance Stairs - Rails: Both  Bathroom Shower/Tub: Tub/Shower unit, Doors  Bathroom Toilet: Standard  Bathroom Equipment: None  Bathroom Accessibility: Accessible  Home Equipment: None  Has the patient had two or more falls in the past year or any fall with injury in the past year?: No  ADL Assistance: [address]: Independent  Homemaking Responsibilities: Yes  Ambulation Assistance: Independent  Transfer Assistance: Independent  Active : Yes  Mode of Transportation: Car  Occupation: Retired  Type of Occupation: [employer]  IADL Comments: sleeps in a flat bed at home  Additional Comments: Pt's roommate works second shift. Pt and pt's family indicated that she has good support, family available for 24/7 assistance as needed  Vision/Hearing  Vision  Vision: Impaired (blurry vision)  Hearing  Hearing: Within functional limits    Cognition   Orientation  Orientation Level: Oriented to place;Oriented to time;Oriented to situation;Oriented to person  Cognition  Overall Cognitive Status: WNL     Objective   O2 Device: None (Room air)     Observation/Palpation  Posture: Fair  Observation: IV L antecubital  Gross Assessment  AROM: Generally decreased, functional  Strength:  Within functional limits  Sensation: Impaired (pain running down L arm due to IV)     AROM RLE (degrees)  RLE AROM: WNL  AROM LLE (degrees)  LLE AROM : WNL  AROM RUE (degrees)  RUE General AROM: see OT for assessment  AROM LUE (degrees)  LUE General AROM: see OT for assessment  Strength RLE  Strength RLE: WNL  R Hip Flexion: 4/5  R Knee Flexion: 5/5  R Knee Extension: 4/5  R Ankle Dorsiflexion: 5/5  R Ankle Plantar flexion: 5/5  Strength LLE  Strength LLE: WNL  L Hip Flexion: 4/5  L Knee Flexion: 5/5  L Knee Extension: 4/5  L Ankle Dorsiflexion: 5/5  L Ankle Plantar Flexion: 5/5  Strength RUE  Comment: see OT for assessment  Strength LUE  Comment: see OT for assessment           Bed
Time: 05/23/23  5:18 AM   Result Value Ref Range    Glucose 111 (H) 70 - 99 mg/dL    BUN 10 8 - 23 mg/dL    Creatinine 0.74 0.50 - 0.90 mg/dL    Est, Glom Filt Rate >60 >60 mL/min/1.73m2    Calcium 8.2 (L) 8.6 - 10.4 mg/dL    Sodium 138 135 - 144 mmol/L    Potassium 4.1 3.7 - 5.3 mmol/L    Chloride 106 98 - 107 mmol/L    CO2 21 20 - 31 mmol/L    Anion Gap 11 9 - 17 mmol/L   CBC with auto differential    Collection Time: 05/23/23  5:18 AM   Result Value Ref Range    WBC 4.1 3.5 - 11.0 k/uL    RBC 3.70 (L) 4.0 - 5.2 m/uL    Hemoglobin 12.2 12.0 - 16.0 g/dL    Hematocrit 34.9 (L) 36 - 46 %    MCV 94.2 80 - 100 fL    MCH 32.9 26 - 34 pg    MCHC 35.0 31 - 37 g/dL    RDW 13.5 11.5 - 14.9 %    Platelets 532 964 - 878 k/uL    MPV 9.0 6.0 - 12.0 fL    Seg Neutrophils 52 36 - 66 %    Lymphocytes 33 24 - 44 %    Monocytes 12 (H) 1 - 7 %    Eosinophils % 2 0 - 4 %    Basophils 1 0 - 2 %    Segs Absolute 2.20 1.3 - 9.1 k/uL    Absolute Lymph # 1.30 1.0 - 4.8 k/uL    Absolute Mono # 0.50 0.1 - 1.3 k/uL    Absolute Eos # 0.10 0.0 - 0.4 k/uL    Basophils Absolute 0.00 0.0 - 0.2 k/uL       Imaging/Diagnostics:  CT HEAD WO CONTRAST    Result Date: 5/21/2023  No acute intracranial abnormality. Evidence of prior bilateral maxillary antrectomies with chronic pansinusitis. Critical results were called by the [facility] Results Communication Team to [facility] on 5/21/2023 at 4:21 p.m. XR CHEST PORTABLE    Result Date: 5/21/2023  No acute process. CTA HEAD NECK W CONTRAST    Result Date: 5/21/2023  No large vessel occlusion in the head or neck. Bilateral internal carotid artery fibromuscular dysplasia. MRI BRAIN WO CONTRAST    Result Date: 5/22/2023  No acute intracranial abnormality. Specifically, no evidence of acute infarct.        Assessment :      Hospital Problems             Last Modified POA    * (Principal) AMS (altered mental status) 5/21/2023 Yes    Accelerated hypertension 5/22/2023 Yes    Stress incontinence of
will perform functional transfers/mobility mod I, using least restrictive device  Short Term Goal 3: Pt will tolerate standing for 5+ minutes during functional activity of choice  Short Term Goal 4: Pt will actively participate in 15+ minutes of therapeutic exercise/functional activity to promote safety and independence with self care and mobility    Plan  Occupational Therapy Plan  Times Per Week: 3-5  Current Treatment Recommendations: Strengthening, Balance training, Functional mobility training, Endurance training, Safety education & training, Patient/Caregiver education & training, Equipment evaluation, education, & procurement, Self-Care / ADL      OT Individual Minutes  OT Individual Minutes  Time In: 3045  Time Out: 6721  Minutes: 27  Time Code Minutes   Timed Code Treatment Minutes: 12 Minutes        Electronically signed by GILBERTO Dias on 5/22/23 at 12:00 PM EDT

## 2023-05-25 ENCOUNTER — OFFICE VISIT (OUTPATIENT)
Dept: FAMILY MEDICINE CLINIC | Age: 73
End: 2023-05-25

## 2023-05-25 VITALS
WEIGHT: 145 LBS | BODY MASS INDEX: 26.68 KG/M2 | HEART RATE: 69 BPM | HEIGHT: 62 IN | SYSTOLIC BLOOD PRESSURE: 130 MMHG | OXYGEN SATURATION: 99 % | DIASTOLIC BLOOD PRESSURE: 80 MMHG | TEMPERATURE: 97.8 F

## 2023-05-25 DIAGNOSIS — F43.9 STRESS AT HOME: ICD-10-CM

## 2023-05-25 DIAGNOSIS — Z09 HOSPITAL DISCHARGE FOLLOW-UP: ICD-10-CM

## 2023-05-25 DIAGNOSIS — E87.6 HYPOKALEMIA: ICD-10-CM

## 2023-05-25 DIAGNOSIS — I16.0 HYPERTENSIVE URGENCY: Primary | ICD-10-CM

## 2023-05-25 DIAGNOSIS — I67.4 HYPERTENSIVE ENCEPHALOPATHY: ICD-10-CM

## 2023-05-25 DIAGNOSIS — R40.4 TRANSIENT ALTERATION OF AWARENESS: ICD-10-CM

## 2023-05-25 DIAGNOSIS — N30.00 ACUTE CYSTITIS WITHOUT HEMATURIA: ICD-10-CM

## 2023-05-25 PROBLEM — H91.90 HEARING PROBLEM: Status: RESOLVED | Noted: 2017-01-03 | Resolved: 2023-05-25

## 2023-05-25 PROBLEM — U07.1 COVID: Status: RESOLVED | Noted: 2021-11-08 | Resolved: 2023-05-25

## 2023-05-25 PROBLEM — U07.1 COVID-19: Status: RESOLVED | Noted: 2021-11-09 | Resolved: 2023-05-25

## 2023-05-25 SDOH — ECONOMIC STABILITY: FOOD INSECURITY: WITHIN THE PAST 12 MONTHS, THE FOOD YOU BOUGHT JUST DIDN'T LAST AND YOU DIDN'T HAVE MONEY TO GET MORE.: NEVER TRUE

## 2023-05-25 SDOH — ECONOMIC STABILITY: FOOD INSECURITY: WITHIN THE PAST 12 MONTHS, YOU WORRIED THAT YOUR FOOD WOULD RUN OUT BEFORE YOU GOT MONEY TO BUY MORE.: NEVER TRUE

## 2023-05-25 SDOH — ECONOMIC STABILITY: INCOME INSECURITY: HOW HARD IS IT FOR YOU TO PAY FOR THE VERY BASICS LIKE FOOD, HOUSING, MEDICAL CARE, AND HEATING?: NOT HARD AT ALL

## 2023-05-25 SDOH — ECONOMIC STABILITY: HOUSING INSECURITY
IN THE LAST 12 MONTHS, WAS THERE A TIME WHEN YOU DID NOT HAVE A STEADY PLACE TO SLEEP OR SLEPT IN A SHELTER (INCLUDING NOW)?: NO

## 2023-05-25 NOTE — PROGRESS NOTES
Visit Information    Have you changed or started any medications since your last visit including any over-the-counter medicines, vitamins, or herbal medicines? no   Are you having any side effects from any of your medications? -  no  Have you stopped taking any of your medications? Is so, why? -  no    Have you seen any other physician or provider since your last visit? Yes - Records Obtained  Have you had any other diagnostic tests since your last visit? Yes - Records Obtained  Have you been seen in the emergency room and/or had an admission to a hospital since we last saw you? Yes - Records Obtained  Have you had your routine dental cleaning in the past 6 months? no    Have you activated your [employer] account? If not, what are your barriers?  Yes     Patient Care Team:  Zarina Arthur MD as PCP - General (Family Medicine)  Zarina Arthur MD as PCP - Empaneled Provider  Barbara Wood MD as Surgeon (Otolaryngology)  Pancho Tripp LPN as GUSTAVO Clancy RN as Care Transitions Nurse    Medical History Review  Past Medical, Family, and Social History reviewed and does contribute to the patient presenting condition    Health Maintenance   Topic Date Due    COVID-19 Vaccine (1) Never done    Shingles vaccine (1 of 2) Never done    Breast cancer screen  09/18/2022    Low dose CT lung screening  10/27/2023    Colorectal Cancer Screen  11/21/2023    Annual Wellness Visit (AWV)  12/08/2023    Depression Screen  01/17/2024    Lipids  05/22/2024    DTaP/Tdap/Td vaccine (2 - Td or Tdap) 04/04/2028    DEXA (modify frequency per FRAX score)  Completed    Pneumococcal 65+ years Vaccine  Completed    Hepatitis C screen  Completed    Hepatitis A vaccine  Aged Out    Hib vaccine  Aged Out    Meningococcal (ACWY) vaccine  Aged Out
colorectal cancer screening using Cologuard test    S/P laparoscopic cholecystectomy    Chronic cholecystitis with calculus    Sprain of left rotator cuff capsule    Mixed hyperlipidemia    Chronic midline low back pain with bilateral sciatica    Allergic rhinitis due to allergen    Vitamin D deficiency    AMS (altered mental status)    Hypertensive encephalopathy    Transient alteration of awareness    Hypokalemia    Hypertensive urgency    Stress at home       Medications listed as ordered at the time of discharge from hospital     Medication List            Accurate as of May 25, 2023 11:59 PM. If you have any questions, ask your nurse or doctor. CHANGE how you take these medications      * Cholecalciferol 1.25 MG (85909 UT) Tabs  Take 1 tablet by mouth once a week  What changed: additional instructions     * vitamin D 25 MCG (1000 UT) Tabs tablet  Commonly known as: CHOLECALCIFEROL  Take 1 tablet by mouth daily  What changed: Another medication with the same name was changed. Make sure you understand how and when to take each. spironolactone 25 MG tablet  Commonly known as: ALDACTONE  Take 1 tablet by mouth daily  What changed: additional instructions           * This list has 2 medication(s) that are the same as other medications prescribed for you. Read the directions carefully, and ask your doctor or other care provider to review them with you.                 CONTINUE taking these medications      albuterol sulfate  (90 Base) MCG/ACT inhaler  Commonly known as: PROVENTIL;VENTOLIN;PROAIR  Inhale 2 puffs into the lungs 4 times daily as needed for Wheezing     amLODIPine 5 MG tablet  Commonly known as: NORVASC  Take 1 tablet by mouth daily     atorvastatin 40 MG tablet  Commonly known as: LIPITOR  Take 1 tablet by mouth every day     lisinopril 40 MG tablet  Commonly known as: PRINIVIL;ZESTRIL  TAKE 1 TABLET BY MOUTH EVERY DAY     Myrbetriq 50 MG Tb24  Generic drug: mirabegron  TAKE 1

## 2023-05-25 NOTE — PATIENT INSTRUCTIONS
Thank you for choosing [facility]) as your healthcare provider as your care is important to us. Please arrive at your appointment on time. If you are unable to make your appointment, please call our office as soon as possible so that we may reschedule your appointment. Missing 3 appointments in a calendar year without notifying the office may lead to dismissal from the practice. We appreciate you calling at least 24 hours in advance, when possible. Thank you. New Updates for [facility]    Thank you for choosing US to give you the best care! [facility]) is always trying to think of new ways to help their patients. We are asking all patients to try out the new digital registration that is now available through your [facility] account Via the claudy you're now able to update your personal and registration information prior to your upcoming appointment. This will save you time once you arrive at the office to check-in, not to mention your information remains safe!! Many other perks come from signing up for an account, such as:  Requesting refills  Scheduling an appointment  Completing an E-Visit  Sending a message to the office/provider  Having access to your medication list  Paying your bill/copay prior to your appointment  Scheduling your yearly mammogram  Review your test results    If you are not familiar with [facility] please ask one of us and we will be happy to answer any questions or help you set-up your account.       Your [employer],

## 2023-05-26 ASSESSMENT — ENCOUNTER SYMPTOMS
EYE REDNESS: 0
TROUBLE SWALLOWING: 0
NAUSEA: 0
SHORTNESS OF BREATH: 0
BACK PAIN: 0
SORE THROAT: 0
COUGH: 0
BLOOD IN STOOL: 0
SINUS PRESSURE: 0
DIARRHEA: 0
RHINORRHEA: 0
CONSTIPATION: 0
WHEEZING: 0
RECTAL PAIN: 0
VOMITING: 0
COLOR CHANGE: 0
ABDOMINAL PAIN: 0
STRIDOR: 0
ABDOMINAL DISTENTION: 0
CHEST TIGHTNESS: 0

## 2023-05-31 ENCOUNTER — CARE COORDINATION (OUTPATIENT)
Dept: CASE MANAGEMENT | Age: 73
End: 2023-05-31

## 2023-05-31 NOTE — CARE COORDINATION
[facility] Care Transitions Follow Up Call    Patient Current Location:  Home: [address] [place] [address]    Care Transition Nurse contacted the patient by telephone to follow up after admission on 23. Verified name and  with patient as identifiers. Patient: Mora Núñez  Patient : 1950   MRN: [id number]  Reason for Admission:   Discharge Date: 23 RARS: Readmission Risk Score: 6.4      Needs to be reviewed by the provider   Additional needs identified to be addressed with provider: No  none             Method of communication with provider: none. Writer spoke to patient, she is feeling better today, hasn't had any vision problems or headaches, had follow up with her pcp, medication changes  CHANGE how you take these medications       * Cholecalciferol 1.25 MG (08992 UT) Tabs  Take 1 tablet by mouth once a week  What changed: additional instructions      * vitamin D 25 MCG (1000 UT) Tabs tablet  Commonly known as: CHOLECALCIFEROL  Take 1 tablet by mouth daily  What changed: Another medication with the same name was changed. Make sure you understand how and when to take each. spironolactone 25 MG tablet  Commonly known as: ALDACTONE  Take 1 tablet by mouth daily  What changed: additional instructions     Patient stated she has had some stress but denies any c/o fever, chills, n/v/d, sob or chest pain, , has f/I [facility] cardiology on 23, will continue to follow//JU    Addressed changes since last contact:  none  Discussed follow-up appointments. If no appointment was previously scheduled, appointment scheduling offered: Yes. Is follow up appointment scheduled within 7 days of discharge? Yes.     Follow Up  Future Appointments   Date Time Provider Adin Gonzalez   2023 12:15 PM Hanna Parsons MD fp [facility]     Non-[facility] follow up appointment(s): 23 with cardiologist      Offered patient enrollment in the Remote Patient Monitoring (RPM) program for in-home monitoring: Patient

## 2023-06-07 ENCOUNTER — CARE COORDINATION (OUTPATIENT)
Dept: CASE MANAGEMENT | Age: 73
End: 2023-06-07

## 2023-06-22 ENCOUNTER — CARE COORDINATION (OUTPATIENT)
Dept: CASE MANAGEMENT | Age: 73
End: 2023-06-22

## 2023-06-22 NOTE — CARE COORDINATION
Patient has graduated from the Care Transitions program on 6/22/23. Patient/family has the ability to self-manage at this time. Patient declined referral to the [facility] team for further management. Final call- Writer spoke to patient, she is doing ok, just  still tired, has f./u with cardiology on 6/28/23, informed patient of final call, patient v/u  encouraged to call with any needs, care transitions episode resolved//JU    Patient has Care Transition Nurse's contact information for any further questions, concerns, or needs.   Patients upcoming visits:    Future Appointments   Date Time Provider Adin Gonzalez   12/11/2023 12:15 PM Tammy Daigle MD fp kishan [facility]as

## 2023-06-28 ENCOUNTER — HOSPITAL ENCOUNTER (OUTPATIENT)
Age: 73
Discharge: HOME | End: 2023-06-28
Payer: MEDICARE

## 2023-06-28 LAB
ANION GAP SERPL CALCULATED.3IONS-SCNC: 10 MMOL/L (ref 9–17)
BUN SERPL-MCNC: 12 MG/DL (ref 8–23)
CALCIUM SERPL-MCNC: 9.4 MG/DL (ref 8.6–10.4)
CHLORIDE SERPL-SCNC: 100 MMOL/L (ref 98–107)
CO2 SERPL-SCNC: 27 MMOL/L (ref 20–31)
CREAT SERPL-MCNC: 0.77 MG/DL (ref 0.5–0.9)
GFR SERPL CREATININE-BSD FRML MDRD: >60 ML/MIN/1.73M2
GLUCOSE SERPL-MCNC: 78 MG/DL (ref 70–99)
MAGNESIUM SERPL-MCNC: 2.1 MG/DL (ref 1.6–2.6)
POTASSIUM SERPL-SCNC: 4.4 MMOL/L (ref 3.7–5.3)
SODIUM SERPL-SCNC: 137 MMOL/L (ref 135–144)

## 2023-06-28 PROCEDURE — 80048 BASIC METABOLIC PNL TOTAL CA: CPT

## 2023-06-28 PROCEDURE — 36415 COLL VENOUS BLD VENIPUNCTURE: CPT

## 2023-06-28 PROCEDURE — 83735 ASSAY OF MAGNESIUM: CPT

## 2023-08-22 ENCOUNTER — HOSPITAL ENCOUNTER (OUTPATIENT)
Age: 73
Discharge: HOME | End: 2023-08-22
Payer: MEDICARE

## 2023-08-22 LAB
ANION GAP SERPL CALCULATED.3IONS-SCNC: 11 MMOL/L (ref 9–17)
BUN SERPL-MCNC: 10 MG/DL (ref 8–23)
CALCIUM SERPL-MCNC: 8.3 MG/DL (ref 8.6–10.4)
CHLORIDE SERPL-SCNC: 107 MMOL/L (ref 98–107)
CO2 SERPL-SCNC: 25 MMOL/L (ref 20–31)
CREAT SERPL-MCNC: 0.6 MG/DL (ref 0.5–0.9)
GFR SERPL CREATININE-BSD FRML MDRD: >60 ML/MIN/1.73M2
GLUCOSE SERPL-MCNC: 110 MG/DL (ref 70–99)
POTASSIUM SERPL-SCNC: 4.1 MMOL/L (ref 3.7–5.3)
SODIUM SERPL-SCNC: 143 MMOL/L (ref 135–144)

## 2023-08-22 PROCEDURE — 36415 COLL VENOUS BLD VENIPUNCTURE: CPT

## 2023-08-22 PROCEDURE — 80048 BASIC METABOLIC PNL TOTAL CA: CPT

## 2023-08-29 DIAGNOSIS — E78.2 MIXED HYPERLIPIDEMIA: ICD-10-CM

## 2023-08-29 RX ORDER — ATORVASTATIN CALCIUM 40 MG/1
40 TABLET, FILM COATED ORAL DAILY
Qty: 100 TABLET | Refills: 1 | Status: SHIPPED | OUTPATIENT
Start: 2023-08-29

## 2023-08-29 NOTE — TELEPHONE ENCOUNTER
[facility] HEALTH CLINICAL PHARMACY: ADHERENCE REVIEW  Identified care gap per [employer]: fills at [facility]: ACE/ARB and Statin adherence      ASSESSMENT    ACE/ARB ADHERENCE    Insurance Records claims through 23 (Prior Year [address] = 100% - PASSED; YTD PDC = 100% - PASSED):   LISINOPRIL   TAB 40MG last filled on 23 for 90 day supply. Next refill due: 23      BP Readings from Last 3 Encounters:   23 130/80   23 133/66   23 (!) 186/84     Estimated Creatinine Clearance: 73 mL/min (based on SCr of 0.6 mg/dL). Lab Results   Component Value Date    CREATININE 0.6 2023     Lab Results   Component Value Date    K 4.1 2023     STATIN ADHERENCE    Insurance Records claims through 23 (Prior Year [address] = not reported; YTD [address] = FIRST FILL; Potential Fail Date: 23):   SIMVASTATIN  TAB 20MG last filled on 23 for 90 day supply. Next refill due: 23    Prescribed si tablet/capsule daily        Lab Results   Component Value Date    CHOL 161 2023    TRIG 67 2023    HDL 48 2023    LDLCHOLESTEROL 100 2023     ALT   Date Value Ref Range Status   2023 12 5 - 33 U/L Final     AST   Date Value Ref Range Status   2023 15 <32 U/L Final     The 10-year ASCVD risk score (Chante BUITRAGO, et al., 2019) is: 19.1%    Values used to calculate the score:      Age: 74 years      Sex: Female      Is Non- : No      Diabetic: No      Tobacco smoker: No      Systolic Blood Pressure: 328 mmHg      Is BP treated: Yes      HDL Cholesterol: 48 mg/dL      Total Cholesterol: 161 mg/dL     PLAN  Per insurer report, LIS-Full Subsidy - may be able to receive up to a 100-day supply for the same cost as a 30-day supply    Patient found in Outcomes MTM and is not currently eligible for CMR or TIP    The following are interventions that have been identified:   SIMVASTATIN  TAB 20MG not listed on medication list. But Atorvastatin 40mg is.      Atorvastatin

## 2023-08-29 NOTE — TELEPHONE ENCOUNTER
HPI Comments: 66 y.o. male with past medical history significant for UTI and constipation who presents ambulatory via private vehicle with chief complaint of urinary retention. Pt reports urinary retention accompanied by sensation that he needs to urinate and burning at the tip of his penis since this morning. Pt reports he urinated once this morning and his stream was \"medium strength\". Pt also notes lower abd cramping that is exacerbated by sitting and lying down and has progressively worsened since onset this morning. Pt also notes constipation, explaining he had a firm BM yesterday and has not had one since. Pt notes he took miralax this morning but usually takes colace for his occasional constipation. Pt denies weakness, fever and chills. Pt denies hx of back problems. There are no other acute medical concerns at this time. Social hx:     Note written by Cinthia Lopez, as dictated by Shahab Diaz MD 11:38 PM      The history is provided by the patient. No past medical history on file. No past surgical history on file. No family history on file. Social History     Social History    Marital status: N/A     Spouse name: N/A    Number of children: N/A    Years of education: N/A     Occupational History    Not on file. Social History Main Topics    Smoking status: Not on file    Smokeless tobacco: Not on file    Alcohol use Not on file    Drug use: Not on file    Sexual activity: Not on file     Other Topics Concern    Not on file     Social History Narrative         ALLERGIES: Sulfa (sulfonamide antibiotics)    Review of Systems   Constitutional: Negative for chills, diaphoresis, fatigue and fever. HENT: Negative for congestion, rhinorrhea, sinus pressure, sore throat, trouble swallowing and voice change. Eyes: Negative for photophobia and visual disturbance. Respiratory: Negative for cough, chest tightness and shortness of breath.     Cardiovascular: Kitty Giron MD, patient out of refills and patient eligible for up to 100-day supply, if appropriate.  Rx(s) pended for your signature/modification as appropriate    Last Visit: 5/25/23  Next Visit: 12/11/23    Thank you,  Brianna Anna, PharmD, [address] [place] [address], toll free: [phone], option 1 Negative for chest pain, palpitations and leg swelling. Gastrointestinal: Positive for abdominal pain and constipation. Negative for blood in stool, diarrhea, nausea and vomiting. Genitourinary: Positive for difficulty urinating. Negative for frequency. Musculoskeletal: Negative for arthralgias, myalgias and neck pain. Neurological: Negative for dizziness, weakness, light-headedness, numbness and headaches. All other systems reviewed and are negative. Vitals:    07/04/18 2314   BP: (!) 225/93   Pulse: 84   Resp: 18   Temp: 98.9 °F (37.2 °C)   SpO2: 98%   Weight: 86.2 kg (190 lb)   Height: 5' 8\" (1.727 m)            Physical Exam   Constitutional: He is oriented to person, place, and time. He appears well-developed and well-nourished. Uncomfortable appearing   HENT:   Head: Normocephalic and atraumatic. Nose: Nose normal.   Mouth/Throat: Oropharynx is clear and moist. No oropharyngeal exudate. Eyes: Conjunctivae and EOM are normal. Right eye exhibits no discharge. Left eye exhibits no discharge. No scleral icterus. Neck: Normal range of motion. Neck supple. No JVD present. No tracheal deviation present. No thyromegaly present. Cardiovascular: Normal rate, regular rhythm, normal heart sounds and intact distal pulses. Exam reveals no gallop and no friction rub. No murmur heard. Pulmonary/Chest: Effort normal and breath sounds normal. No stridor. No respiratory distress. He has no wheezes. He has no rales. He exhibits no tenderness. Abdominal: Bowel sounds are normal. He exhibits distension. He exhibits no mass. There is tenderness (RLQ). There is no rebound. Mildly tympanic   Genitourinary:   Genitourinary Comments: Clear drainage from penis   Musculoskeletal: Normal range of motion. He exhibits no edema or tenderness. Lymphadenopathy:     He has no cervical adenopathy. Neurological: He is alert and oriented to person, place, and time. No cranial nerve deficit.  Coordination normal. Skin: Skin is warm and dry. No rash noted. He is not diaphoretic. No erythema. No pallor. Psychiatric: He has a normal mood and affect. His behavior is normal. Judgment and thought content normal.   Nursing note and vitals reviewed. Note written by Cinthia Lozoya, as dictated by Parveen Staples MD 11:38 PM    MDM  Number of Diagnoses or Management Options  Acute cystitis with hematuria:   Diagnosis management comments: BPH, UTI, constipation. 67 y/o male with urinary retention and hx of UTI. Plan:  Cbc, cmp, ua, urine culture, toradol for pain. Reassessement:  UA concerning for UTI, pt feeling improved after being straight cathed, will dc with Abx (macrobid) will follow up with urology in AM regarding urinary retention. Amount and/or Complexity of Data Reviewed  Clinical lab tests: ordered and reviewed  Review and summarize past medical records: yes  Independent visualization of images, tracings, or specimens: yes    Risk of Complications, Morbidity, and/or Mortality  Presenting problems: moderate  Diagnostic procedures: moderate  Management options: moderate    Patient Progress  Patient progress: resolved        ED Course       Procedures    PROGRESS NOTE:  1:40 AM  Discussed lab results with pt, will discharge home. 8:11 PM  The patient has been reevaluated. The patient is ready for discharge. The patient's signs, symptoms, diagnosis, and discharge instructions have been discussed and the patient/ family has conveyed their understanding. The patient is to follow up as recommended or return to the ED should their symptoms worsen. Plan has been discussed and the patient is in agreement. LABORATORY TESTS:  No results found for this or any previous visit (from the past 12 hour(s)). IMAGING RESULTS:  No orders to display     No results found. MEDICATIONS GIVEN:  Medications   ketorolac (TORADOL) injection 15 mg (15 mg IntraVENous Given 7/5/18 0031)       IMPRESSION:  1. Acute cystitis with hematuria        PLAN:  1. Discharge Medication List as of 7/5/2018  1:41 AM      START taking these medications    Details   nitrofurantoin, macrocrystal-monohydrate, (MACROBID) 100 mg capsule Take 1 Cap by mouth two (2) times a day for 3 days. , Print, Disp-6 Cap, R-0           2.    Follow-up Information     Follow up With Details Priti Gibson MD  If symptoms worsen [facility] [address]  [phone]      [facility] EMERGENCY DEPT  If symptoms worsen [address] [place] [address]  [phone]            Return to ED for new or worsening symptoms       Parveen Staples MD

## 2023-08-29 NOTE — TELEPHONE ENCOUNTER
Appears per 4/28/23 telephone encounter, atorvastatin 40mg daily changed to simvastatin 20mg daily d/t atorvastatin recall. Simvastatin removed from medication list 5/21/23 as reason of \"side effects\", and atorvastatin 40mg daily remained on medication list but 0 refills remain. Reached patient to review - states she had side effects with simvastatin, but does not recall what they were. She had asked her pharmacy more about the atorvastatin recall and learned the ones she had were ok to take, but has been taking every other day d/t limited supply and is down to 4 tablets left. Agreeable to resuming atorvastatin every day, would like rx to [facility] and [facility] texts her when rx ready to .

## 2023-08-30 NOTE — TELEPHONE ENCOUNTER
Noted atorvastatin rx for 100ds approved, thank you!     Letter sent to patient.      =======================================================   For Pharmacy Admin Tracking Only    Program: Vielka in place:  No  Recommendation Provided To: Provider: 1 via Note to Provider and Patient/Caregiver: 1 via Telephone  Intervention Detail: Adherence Monitorin and New Rx: 1, reason: Improve Adherence  Intervention Accepted By: Provider: 1 and Patient/Caregiver: 1  Gap Closed?: Yes   Time Spent (min): 20

## 2023-09-26 DIAGNOSIS — E55.9 VITAMIN D DEFICIENCY: ICD-10-CM

## 2023-09-26 RX ORDER — METHOCARBAMOL 750 MG/1
TABLET ORAL
Qty: 12 CAPSULE | Refills: 2 | Status: SHIPPED | OUTPATIENT
Start: 2023-09-26

## 2023-09-26 NOTE — TELEPHONE ENCOUNTER
Arun Gandhi is calling to request a refill on the following medication(s):    Medication Request:  Requested Prescriptions     Pending Prescriptions Disp Refills    D3-50 1.25 MG (58856 UT) CAPS [Pharmacy Med Name: D3-50 Oral Capsule 1.25 MG (70747 UT)] 12 capsule 0     Sig: TAKE ONE TABLET BY MOUTH ONCE A WEEK       Last Visit Date (If Applicable):  0/52/0840    Next Visit Date:    12/11/2023

## 2023-09-27 ENCOUNTER — TELEPHONE (OUTPATIENT)
Dept: ONCOLOGY | Age: 73
End: 2023-09-27

## 2023-09-27 DIAGNOSIS — Z87.891 PERSONAL HISTORY OF NICOTINE DEPENDENCE: Primary | ICD-10-CM

## 2023-09-27 NOTE — TELEPHONE ENCOUNTER
Our records indicate that your patient is coming due for their annual lung cancer screening follow up testing. For your convenience, we have pended the order for the scan for you. If you do not agree with the need for the test, please cancel the order and let us know. Sincerely,    [address] Screening Program    Auto printed reminder letter sent to patient.

## 2023-11-13 ENCOUNTER — OFFICE VISIT (OUTPATIENT)
Dept: FAMILY MEDICINE CLINIC | Age: 73
End: 2023-11-13
Payer: MEDICARE

## 2023-11-13 ENCOUNTER — HOSPITAL ENCOUNTER (OUTPATIENT)
Age: 73
Setting detail: SPECIMEN
Discharge: HOME | End: 2023-11-13

## 2023-11-13 VITALS
OXYGEN SATURATION: 97 % | HEART RATE: 87 BPM | SYSTOLIC BLOOD PRESSURE: 158 MMHG | DIASTOLIC BLOOD PRESSURE: 83 MMHG | TEMPERATURE: 98.4 F

## 2023-11-13 DIAGNOSIS — J44.1 COPD WITH ACUTE EXACERBATION (HCC): ICD-10-CM

## 2023-11-13 DIAGNOSIS — H66.92 LEFT OTITIS MEDIA, UNSPECIFIED OTITIS MEDIA TYPE: ICD-10-CM

## 2023-11-13 DIAGNOSIS — Z20.822 SUSPECTED COVID-19 VIRUS INFECTION: ICD-10-CM

## 2023-11-13 DIAGNOSIS — H66.92 LEFT OTITIS MEDIA, UNSPECIFIED OTITIS MEDIA TYPE: Primary | ICD-10-CM

## 2023-11-13 PROCEDURE — 1123F ACP DISCUSS/DSCN MKR DOCD: CPT | Performed by: NURSE PRACTITIONER

## 2023-11-13 PROCEDURE — 3077F SYST BP >= 140 MM HG: CPT | Performed by: NURSE PRACTITIONER

## 2023-11-13 PROCEDURE — 99214 OFFICE O/P EST MOD 30 MIN: CPT | Performed by: NURSE PRACTITIONER

## 2023-11-13 PROCEDURE — 3078F DIAST BP <80 MM HG: CPT | Performed by: NURSE PRACTITIONER

## 2023-11-13 RX ORDER — PREDNISONE 20 MG/1
20 TABLET ORAL 2 TIMES DAILY
Qty: 10 TABLET | Refills: 0 | Status: SHIPPED | OUTPATIENT
Start: 2023-11-13 | End: 2023-11-18

## 2023-11-13 RX ORDER — ALBUTEROL SULFATE 90 UG/1
2 AEROSOL, METERED RESPIRATORY (INHALATION) 4 TIMES DAILY PRN
Qty: 54 G | Refills: 1 | Status: SHIPPED | OUTPATIENT
Start: 2023-11-13

## 2023-11-13 RX ORDER — BENZONATATE 100 MG/1
100 CAPSULE ORAL 3 TIMES DAILY PRN
Qty: 21 CAPSULE | Refills: 0 | Status: SHIPPED | OUTPATIENT
Start: 2023-11-13 | End: 2023-11-20

## 2023-11-13 RX ORDER — AZITHROMYCIN 250 MG/1
250 TABLET, FILM COATED ORAL SEE ADMIN INSTRUCTIONS
Qty: 6 TABLET | Refills: 0 | Status: SHIPPED | OUTPATIENT
Start: 2023-11-13 | End: 2023-11-18

## 2023-11-13 RX ORDER — FLUTICASONE PROPIONATE 50 MCG
SPRAY, SUSPENSION (ML) NASAL
COMMUNITY
Start: 2023-10-20

## 2023-11-13 RX ORDER — GUAIFENESIN 600 MG/1
600 TABLET, EXTENDED RELEASE ORAL 2 TIMES DAILY
Qty: 30 TABLET | Refills: 0 | Status: SHIPPED | OUTPATIENT
Start: 2023-11-13 | End: 2023-11-28

## 2023-11-13 ASSESSMENT — ENCOUNTER SYMPTOMS
CHEST TIGHTNESS: 0
WHEEZING: 1
COUGH: 1
CHOKING: 0
SHORTNESS OF BREATH: 0
SORE THROAT: 0
STRIDOR: 0
RHINORRHEA: 1

## 2023-11-13 NOTE — PROGRESS NOTES
[facility]  [address] [address]  Dept: [phone]  Dept Fax: [phone]    Maggie Loera is a 74 y.o. female who presents to the urgent care today for her medical conditions/complaints as notedbelow. Maggie Loera is c/o of Cough (Onset 4 days taking OTC medication with no relief ) and Congestion      HPI:     74 yr old female with hx bronchitis, allergies and sinus problems presents for cough for 4 days and now chest congestion. + smoker  Gradual onset mild then a lot worse last night, felt feverish  Used her inhaler once. Left ear pain today  home covid test neg - taken <24 hrs into sx  Hx 1 covid vaccine - allergic to the shot    Cough  This is a new problem. The current episode started in the past 7 days. The problem has been gradually worsening. The cough is Productive of purulent sputum (green). Associated symptoms include chills, ear congestion, ear pain, headaches, myalgias, nasal congestion, postnasal drip, rhinorrhea and wheezing. Pertinent negatives include no fever, sore throat, shortness of breath or sweats. Risk factors for lung disease include smoking/tobacco exposure. Treatments tried: flonase. The treatment provided mild relief. Her past medical history is significant for bronchitis, COPD and environmental allergies. There is no history of asthma, bronchiectasis, emphysema or pneumonia.        Past Medical History:   Diagnosis Date    Arthritis     spine    COVID 11/08/2021    Gallstones     Hearing loss     Hx of bronchitis     Hyperlipidemia     Hypertension     Seasonal allergies     Spondylolisthesis at L5-S1 level     s/p cage and fusion    Wellness examination 07/14/2021    pcp-Dr Kasey Scherer-last visit june 2021        Current Outpatient Medications   Medication Sig Dispense Refill    albuterol sulfate HFA (PROVENTIL;VENTOLIN;PROAIR) 108 (90 Base) MCG/ACT inhaler

## 2023-11-14 DIAGNOSIS — U07.1 COVID-19: Primary | ICD-10-CM

## 2023-11-14 LAB
SARS-COV-2 RNA RESP QL NAA+PROBE: ABNORMAL
SARS-COV-2 RNA RESP QL NAA+PROBE: DETECTED
SOURCE: ABNORMAL

## 2023-12-11 ENCOUNTER — HOSPITAL ENCOUNTER (OUTPATIENT)
Age: 73
Discharge: HOME | End: 2023-12-11
Payer: MEDICARE

## 2023-12-11 ENCOUNTER — OFFICE VISIT (OUTPATIENT)
Dept: FAMILY MEDICINE CLINIC | Age: 73
End: 2023-12-11
Payer: MEDICARE

## 2023-12-11 VITALS
WEIGHT: 153.4 LBS | DIASTOLIC BLOOD PRESSURE: 86 MMHG | HEIGHT: 62 IN | HEART RATE: 67 BPM | SYSTOLIC BLOOD PRESSURE: 128 MMHG | OXYGEN SATURATION: 98 % | TEMPERATURE: 97.2 F | BODY MASS INDEX: 28.23 KG/M2

## 2023-12-11 DIAGNOSIS — Z23 NEED FOR ZOSTER VACCINATION: ICD-10-CM

## 2023-12-11 DIAGNOSIS — I10 ESSENTIAL HYPERTENSION: ICD-10-CM

## 2023-12-11 DIAGNOSIS — H91.93 BILATERAL HEARING LOSS, UNSPECIFIED HEARING LOSS TYPE: ICD-10-CM

## 2023-12-11 DIAGNOSIS — Z71.89 ACP (ADVANCE CARE PLANNING): ICD-10-CM

## 2023-12-11 DIAGNOSIS — D36.9 ADENOMATOUS POLYP: ICD-10-CM

## 2023-12-11 DIAGNOSIS — Z00.00 MEDICARE ANNUAL WELLNESS VISIT, SUBSEQUENT: Primary | ICD-10-CM

## 2023-12-11 PROBLEM — I67.4 HYPERTENSIVE ENCEPHALOPATHY: Status: RESOLVED | Noted: 2023-05-22 | Resolved: 2023-12-11

## 2023-12-11 PROBLEM — E87.6 HYPOKALEMIA: Status: RESOLVED | Noted: 2023-05-25 | Resolved: 2023-12-11

## 2023-12-11 PROBLEM — R19.5 POSITIVE FIT (FECAL IMMUNOCHEMICAL TEST): Status: RESOLVED | Noted: 2020-02-13 | Resolved: 2023-12-11

## 2023-12-11 PROBLEM — R40.4 TRANSIENT ALTERATION OF AWARENESS: Status: RESOLVED | Noted: 2023-05-25 | Resolved: 2023-12-11

## 2023-12-11 PROBLEM — R19.5 POSITIVE COLORECTAL CANCER SCREENING USING COLOGUARD TEST: Status: RESOLVED | Noted: 2020-10-06 | Resolved: 2023-12-11

## 2023-12-11 PROBLEM — I16.0 HYPERTENSIVE URGENCY: Status: RESOLVED | Noted: 2023-05-25 | Resolved: 2023-12-11

## 2023-12-11 LAB
ANION GAP SERPL CALCULATED.3IONS-SCNC: 9 MMOL/L (ref 9–17)
BUN SERPL-MCNC: 9 MG/DL (ref 8–23)
CALCIUM SERPL-MCNC: 8.5 MG/DL (ref 8.6–10.4)
CHLORIDE SERPL-SCNC: 102 MMOL/L (ref 98–107)
CO2 SERPL-SCNC: 27 MMOL/L (ref 20–31)
CREAT SERPL-MCNC: 0.6 MG/DL (ref 0.5–0.9)
GFR SERPL CREATININE-BSD FRML MDRD: >60 ML/MIN/1.73M2
GLUCOSE SERPL-MCNC: 106 MG/DL (ref 70–99)
POTASSIUM SERPL-SCNC: 4.3 MMOL/L (ref 3.7–5.3)
SODIUM SERPL-SCNC: 138 MMOL/L (ref 135–144)

## 2023-12-11 PROCEDURE — G0439 PPPS, SUBSEQ VISIT: HCPCS | Performed by: FAMILY MEDICINE

## 2023-12-11 PROCEDURE — 99497 ADVNCD CARE PLAN 30 MIN: CPT | Performed by: FAMILY MEDICINE

## 2023-12-11 PROCEDURE — 1123F ACP DISCUSS/DSCN MKR DOCD: CPT | Performed by: FAMILY MEDICINE

## 2023-12-11 PROCEDURE — 80048 BASIC METABOLIC PNL TOTAL CA: CPT

## 2023-12-11 PROCEDURE — 3079F DIAST BP 80-89 MM HG: CPT | Performed by: FAMILY MEDICINE

## 2023-12-11 PROCEDURE — 36415 COLL VENOUS BLD VENIPUNCTURE: CPT

## 2023-12-11 PROCEDURE — 3074F SYST BP LT 130 MM HG: CPT | Performed by: FAMILY MEDICINE

## 2023-12-11 RX ORDER — SPIRONOLACTONE 25 MG/1
25 TABLET ORAL DAILY
Qty: 30 TABLET | Refills: 3 | Status: SHIPPED | OUTPATIENT
Start: 2023-12-11

## 2023-12-11 SDOH — ECONOMIC STABILITY: FOOD INSECURITY: WITHIN THE PAST 12 MONTHS, THE FOOD YOU BOUGHT JUST DIDN'T LAST AND YOU DIDN'T HAVE MONEY TO GET MORE.: NEVER TRUE

## 2023-12-11 SDOH — ECONOMIC STABILITY: INCOME INSECURITY: HOW HARD IS IT FOR YOU TO PAY FOR THE VERY BASICS LIKE FOOD, HOUSING, MEDICAL CARE, AND HEATING?: NOT HARD AT ALL

## 2023-12-11 SDOH — ECONOMIC STABILITY: FOOD INSECURITY: WITHIN THE PAST 12 MONTHS, YOU WORRIED THAT YOUR FOOD WOULD RUN OUT BEFORE YOU GOT MONEY TO BUY MORE.: NEVER TRUE

## 2023-12-11 ASSESSMENT — PATIENT HEALTH QUESTIONNAIRE - PHQ9
SUM OF ALL RESPONSES TO PHQ QUESTIONS 1-9: 0
SUM OF ALL RESPONSES TO PHQ9 QUESTIONS 1 & 2: 0
2. FEELING DOWN, DEPRESSED OR HOPELESS: 0
SUM OF ALL RESPONSES TO PHQ QUESTIONS 1-9: 0
SUM OF ALL RESPONSES TO PHQ QUESTIONS 1-9: 0
1. LITTLE INTEREST OR PLEASURE IN DOING THINGS: 0
SUM OF ALL RESPONSES TO PHQ QUESTIONS 1-9: 0

## 2023-12-11 ASSESSMENT — LIFESTYLE VARIABLES
HOW MANY STANDARD DRINKS CONTAINING ALCOHOL DO YOU HAVE ON A TYPICAL DAY: 1 OR 2
HOW OFTEN DO YOU HAVE A DRINK CONTAINING ALCOHOL: MONTHLY OR LESS

## 2023-12-15 ENCOUNTER — TELEPHONE (OUTPATIENT)
Dept: GASTROENTEROLOGY | Age: 73
End: 2023-12-15

## 2023-12-15 NOTE — TELEPHONE ENCOUNTER
1st attempt: writer spoke to patient regarding scheduling colonoscopy procedure with Dr. Patience Larkin, pt stated she will contact our office in Spring 2024 to be scheduled. 2nd attempt: writer sent letter to pt's home as a reminder to contact office for scheduling.

## 2024-01-05 ENCOUNTER — OFFICE VISIT (OUTPATIENT)
Dept: FAMILY MEDICINE CLINIC | Age: 74
End: 2024-01-05

## 2024-01-05 VITALS
SYSTOLIC BLOOD PRESSURE: 151 MMHG | TEMPERATURE: 98.2 F | DIASTOLIC BLOOD PRESSURE: 70 MMHG | OXYGEN SATURATION: 98 % | HEART RATE: 85 BPM

## 2024-01-05 DIAGNOSIS — J44.1 COPD WITH ACUTE EXACERBATION (HCC): ICD-10-CM

## 2024-01-05 DIAGNOSIS — J06.9 URI WITH COUGH AND CONGESTION: Primary | ICD-10-CM

## 2024-01-05 DIAGNOSIS — J02.9 SORE THROAT: ICD-10-CM

## 2024-01-05 LAB — S PYO AG THROAT QL: NORMAL

## 2024-01-05 RX ORDER — BENZONATATE 100 MG/1
100 CAPSULE ORAL 3 TIMES DAILY PRN
Qty: 21 CAPSULE | Refills: 0 | Status: SHIPPED | OUTPATIENT
Start: 2024-01-05 | End: 2024-01-12

## 2024-01-05 RX ORDER — AZITHROMYCIN 250 MG/1
250 TABLET, FILM COATED ORAL SEE ADMIN INSTRUCTIONS
Qty: 6 TABLET | Refills: 0 | Status: SHIPPED | OUTPATIENT
Start: 2024-01-05 | End: 2024-01-10

## 2024-01-05 ASSESSMENT — ENCOUNTER SYMPTOMS
COLOR CHANGE: 0
COUGH: 1
CONSTIPATION: 0
EYE PAIN: 0
DIARRHEA: 0
FACIAL SWELLING: 0
RECTAL PAIN: 0
SHORTNESS OF BREATH: 0
STRIDOR: 0
ABDOMINAL PAIN: 0
EYE DISCHARGE: 0
EYE REDNESS: 0
CHEST TIGHTNESS: 0
SORE THROAT: 1
PHOTOPHOBIA: 0
ABDOMINAL DISTENTION: 0
WHEEZING: 0
RHINORRHEA: 0
SINUS PAIN: 0
VISUAL CHANGE: 0
SINUS PRESSURE: 0
APNEA: 0
TROUBLE SWALLOWING: 0
VOMITING: 0
EYES NEGATIVE: 1
CHOKING: 0
CHANGE IN BOWEL HABIT: 0
BLOOD IN STOOL: 0
ANAL BLEEDING: 0
NAUSEA: 0
VOICE CHANGE: 0
SWOLLEN GLANDS: 0
BACK PAIN: 0
EYE ITCHING: 0
GASTROINTESTINAL NEGATIVE: 1

## 2024-01-05 NOTE — PROGRESS NOTES
[facility]  [address] [facility] [address]    [facility]  [address]  [address]  [facility] [address]  Dept: [phone]    Maria L Cantu is a 73 y.o. female Established patient, who presents to the walk-in clinic today with conditions/complaints as noted below:    Chief Complaint   Patient presents with    Sinus Problem     Onset couple days ago, cough, runny nose, throat irritation and this morning right eye discharge.          HPI:     Pharyngitis  This is a new problem. The current episode started in the past 7 days. The problem occurs constantly. The problem has been unchanged. Associated symptoms include congestion, coughing, headaches and a sore throat. Pertinent negatives include no abdominal pain, anorexia, arthralgias, change in bowel habit, chest pain, chills, diaphoresis, fatigue, fever, joint swelling, myalgias, nausea, neck pain, numbness, rash, swollen glands, urinary symptoms, vertigo, visual change, vomiting or weakness. The symptoms are aggravated by swallowing. Treatments tried: Tea, cough drops, mucinex. The treatment provided mild relief.       Past Medical History:   Diagnosis Date    Arthritis     spine    COVID 11/08/2021    Gallstones     Hearing loss     Hx of bronchitis     Hyperlipidemia     Hypertension     Seasonal allergies     Spondylolisthesis at L5-S1 level     s/p cage and fusion    Wellness examination 07/14/2021    pcp-Dr Stephanie Scherer-last visit june 2021       Current Outpatient Medications   Medication Sig Dispense Refill    azithromycin (ZITHROMAX) 250 MG tablet Take 1 tablet by mouth See Admin Instructions for 5 days 500mg on day 1 followed by 250mg on days 2 - 5 6 tablet 0    benzonatate (TESSALON PERLES) 100 MG capsule Take 1 capsule by mouth 3 times daily as needed for Cough 21 capsule 0

## 2024-03-11 ENCOUNTER — OFFICE VISIT (OUTPATIENT)
Dept: FAMILY MEDICINE CLINIC | Age: 74
End: 2024-03-11
Payer: MEDICARE

## 2024-03-11 VITALS
WEIGHT: 154 LBS | HEART RATE: 74 BPM | SYSTOLIC BLOOD PRESSURE: 120 MMHG | OXYGEN SATURATION: 98 % | HEIGHT: 62 IN | BODY MASS INDEX: 28.34 KG/M2 | DIASTOLIC BLOOD PRESSURE: 70 MMHG

## 2024-03-11 DIAGNOSIS — N39.3 STRESS INCONTINENCE OF URINE: ICD-10-CM

## 2024-03-11 DIAGNOSIS — Z87.891 PERSONAL HISTORY OF TOBACCO USE: ICD-10-CM

## 2024-03-11 DIAGNOSIS — I10 ESSENTIAL HYPERTENSION: Primary | ICD-10-CM

## 2024-03-11 DIAGNOSIS — E78.2 MIXED HYPERLIPIDEMIA: ICD-10-CM

## 2024-03-11 DIAGNOSIS — E55.9 VITAMIN D DEFICIENCY: ICD-10-CM

## 2024-03-11 DIAGNOSIS — R79.89 LOW SERUM CALCIUM: ICD-10-CM

## 2024-03-11 DIAGNOSIS — M12.811 ROTATOR CUFF ARTHROPATHY OF RIGHT SHOULDER: ICD-10-CM

## 2024-03-11 DIAGNOSIS — M81.0 AGE-RELATED OSTEOPOROSIS WITHOUT CURRENT PATHOLOGICAL FRACTURE: ICD-10-CM

## 2024-03-11 PROCEDURE — 3078F DIAST BP <80 MM HG: CPT | Performed by: FAMILY MEDICINE

## 2024-03-11 PROCEDURE — 3074F SYST BP LT 130 MM HG: CPT | Performed by: FAMILY MEDICINE

## 2024-03-11 PROCEDURE — 99214 OFFICE O/P EST MOD 30 MIN: CPT | Performed by: FAMILY MEDICINE

## 2024-03-11 PROCEDURE — 1123F ACP DISCUSS/DSCN MKR DOCD: CPT | Performed by: FAMILY MEDICINE

## 2024-03-11 RX ORDER — PREDNISONE 10 MG/1
10 TABLET ORAL DAILY
Qty: 5 TABLET | Refills: 0 | Status: SHIPPED | OUTPATIENT
Start: 2024-03-11 | End: 2024-03-16

## 2024-03-11 RX ORDER — MIRABEGRON 50 MG/1
50 TABLET, FILM COATED, EXTENDED RELEASE ORAL DAILY
Qty: 90 TABLET | Refills: 0 | Status: SHIPPED | OUTPATIENT
Start: 2024-03-11

## 2024-03-11 SDOH — ECONOMIC STABILITY: FOOD INSECURITY: WITHIN THE PAST 12 MONTHS, THE FOOD YOU BOUGHT JUST DIDN'T LAST AND YOU DIDN'T HAVE MONEY TO GET MORE.: NEVER TRUE

## 2024-03-11 SDOH — ECONOMIC STABILITY: FOOD INSECURITY: WITHIN THE PAST 12 MONTHS, YOU WORRIED THAT YOUR FOOD WOULD RUN OUT BEFORE YOU GOT MONEY TO BUY MORE.: NEVER TRUE

## 2024-03-11 SDOH — ECONOMIC STABILITY: INCOME INSECURITY: HOW HARD IS IT FOR YOU TO PAY FOR THE VERY BASICS LIKE FOOD, HOUSING, MEDICAL CARE, AND HEATING?: NOT HARD AT ALL

## 2024-03-11 ASSESSMENT — ENCOUNTER SYMPTOMS
COUGH: 0
COLOR CHANGE: 0
CHEST TIGHTNESS: 0
TROUBLE SWALLOWING: 0
RHINORRHEA: 0
RECTAL PAIN: 0
VOMITING: 0
WHEEZING: 0
STRIDOR: 0
SHORTNESS OF BREATH: 0
ABDOMINAL DISTENTION: 0
SINUS PRESSURE: 0
BLOOD IN STOOL: 0
SORE THROAT: 0
EYE REDNESS: 0
NAUSEA: 0
ABDOMINAL PAIN: 0
CONSTIPATION: 0
BACK PAIN: 1
DIARRHEA: 0

## 2024-03-11 ASSESSMENT — PATIENT HEALTH QUESTIONNAIRE - PHQ9
SUM OF ALL RESPONSES TO PHQ9 QUESTIONS 1 & 2: 0
SUM OF ALL RESPONSES TO PHQ QUESTIONS 1-9: 0
SUM OF ALL RESPONSES TO PHQ QUESTIONS 1-9: 0
1. LITTLE INTEREST OR PLEASURE IN DOING THINGS: 0
2. FEELING DOWN, DEPRESSED OR HOPELESS: 0
SUM OF ALL RESPONSES TO PHQ QUESTIONS 1-9: 0
SUM OF ALL RESPONSES TO PHQ QUESTIONS 1-9: 0

## 2024-03-11 NOTE — PATIENT INSTRUCTIONS
results and important health updates, keeping you well-informed and engaged in your healthcare journey.    5. Increased engagement and collaboration: Direct scheduling encourages greater patient engagement and empowers you to take an active role in managing your healthcare. By accessing the [facility] Patient Portal, you can securely message your healthcare provider, ask questions, request prescription refills, and seek medical advice whenever you need it.    To get started with direct scheduling through the [facility] Patient Portal or to register with [facility], simply visit [email or web address].     We understand that change can sometimes be overwhelming, but we assure you that adopting direct scheduling through the [facility] Patient Portal will enhance your healthcare experience and put you in control of your appointments. Our dedicated staff is available to answer any questions or provide assistance throughout the process.    Thank you for entrusting us with your healthcare needs. We are committed to continuously improving our services and ensuring your satisfaction. Together, let's embrace the future of healthcare convenience and accessibility through direct scheduling on the [facility] Patient Portal.    Wishing you good health and happiness,    []

## 2024-03-11 NOTE — PROGRESS NOTES
Visit Information    Have you changed or started any medications since your last visit including any over-the-counter medicines, vitamins, or herbal medicines? no   Are you having any side effects from any of your medications? -  no  Have you stopped taking any of your medications? Is so, why? -  no    Have you seen any other physician or provider since your last visit? No  Have you had any other diagnostic tests since your last visit? No  Have you been seen in the emergency room and/or had an admission to a hospital since we last saw you? No  Have you had your routine dental cleaning in the past 6 months?     Have you activated your [employer] account? If not, what are your barriers? Yes     Patient Care Team:  Stephanie Scherer MD as PCP - General (Family Medicine)  Stephanie Scherer MD as PCP - Empaneled Provider  Markell Gibbs MD as Surgeon (Otolaryngology)    Medical History Review  Past Medical, Family, and Social History reviewed and does contribute to the patient presenting condition    Health Maintenance   Topic Date Due    COVID-19 Vaccine (1) Never done    Shingles vaccine (1 of 2) Never done    Respiratory Syncytial Virus (RSV) Pregnant or age 60 yrs+ (1 - 1-dose 60+ series) Never done    Low dose CT lung screening &/or counseling  10/27/2023    Colorectal Cancer Screen  11/21/2023    Annual Wellness Visit (Medicare Advantage)  01/01/2024    Lipids  05/22/2024    Depression Screen  12/11/2024    DTaP/Tdap/Td vaccine (2 - Td or Tdap) 04/04/2028    DEXA (modify frequency per FRAX score)  Completed    Pneumococcal 65+ years Vaccine  Completed    Hepatitis C screen  Completed    Hepatitis A vaccine  Aged Out    Hepatitis B vaccine  Aged Out    Hib vaccine  Aged Out    Polio vaccine  Aged Out    Meningococcal (ACWY) vaccine  Aged Out    Breast cancer screen  Discontinued     
Standing Status:   Future     Standing Expiration Date:   3/12/2025    Comprehensive Metabolic Panel     Fasting 8 hrs     Standing Status:   Future     Standing Expiration Date:   3/11/2025    Lipid Panel     Standing Status:   Future     Standing Expiration Date:   3/11/2025     Order Specific Question:   Is Patient Fasting?/# of Hours     Answer:   yes, 8-10 hours    Magnesium     Standing Status:   Future     Standing Expiration Date:   3/11/2025    Vitamin B12 & Folate     Standing Status:   Future     Standing Expiration Date:   3/11/2025    Uric Acid     Standing Status:   Future     Standing Expiration Date:   3/11/2025    TSH with Reflex     Standing Status:   Future     Standing Expiration Date:   3/11/2025    Vitamin D 25 Hydroxy     Standing Status:   Future     Standing Expiration Date:   3/11/2025         Medications Discontinued During This Encounter   Medication Reason    zoster recombinant adjuvanted vaccine (SHINGRIX) 50 MCG/0.5ML SUSR injection     MYRBETRIQ 50 MG TB24 REORDER    amLODIPine (NORVASC) 5 MG tablet Side effects       Aleah received counseling on the following healthy behaviors: nutrition, exercise, and medication adherence  Reviewed prior labs and health maintenance  Continue current medications, diet and exercise.  Discussed use, benefit, and side effects of prescribed medications.  Barriers to medication compliance addressed.  Patient given educational materials - see patient instructions  Was a self-tracking handout given in paper form or via [employer]? Yes    Requested Prescriptions     Signed Prescriptions Disp Refills    MYRBETRIQ 50 MG TB24 90 tablet 0     Sig: Take 50 mg by mouth daily    predniSONE (DELTASONE) 10 MG tablet 5 tablet 0     Sig: Take 1 tablet by mouth daily for 5 days       All patient questions answered.  Patient voiced understanding.     Quality Measures    Body mass index is 28.16 kg/m². Elevated. Weight control planned discussed daily exercise regimen and

## 2024-03-15 ENCOUNTER — HOSPITAL ENCOUNTER (OUTPATIENT)
Age: 74
Discharge: HOME | End: 2024-03-15
Payer: MEDICARE

## 2024-03-15 DIAGNOSIS — E55.9 VITAMIN D DEFICIENCY: ICD-10-CM

## 2024-03-15 DIAGNOSIS — I10 ESSENTIAL HYPERTENSION: ICD-10-CM

## 2024-03-15 DIAGNOSIS — R79.89 LOW SERUM CALCIUM: ICD-10-CM

## 2024-03-15 LAB
25(OH)D3 SERPL-MCNC: 87.3 NG/ML (ref 30–100)
ALBUMIN SERPL-MCNC: 4.4 G/DL (ref 3.5–5.2)
ALP SERPL-CCNC: 73 U/L (ref 35–104)
ALT SERPL-CCNC: 18 U/L (ref 5–33)
ANION GAP SERPL CALCULATED.3IONS-SCNC: 12 MMOL/L (ref 9–17)
AST SERPL-CCNC: 15 U/L
BASOPHILS # BLD: 0 K/UL (ref 0–0.2)
BASOPHILS NFR BLD: 1 % (ref 0–2)
BILIRUB SERPL-MCNC: 0.3 MG/DL (ref 0.3–1.2)
BUN SERPL-MCNC: 12 MG/DL (ref 8–23)
CA-I BLD-SCNC: 1.16 MMOL/L (ref 1.1–1.33)
CALCIUM SERPL-MCNC: 8.8 MG/DL (ref 8.6–10.4)
CHLORIDE SERPL-SCNC: 105 MMOL/L (ref 98–107)
CHOLEST SERPL-MCNC: 211 MG/DL
CHOLESTEROL/HDL RATIO: 4
CO2 SERPL-SCNC: 26 MMOL/L (ref 20–31)
CREAT SERPL-MCNC: 0.8 MG/DL (ref 0.5–0.9)
EOSINOPHIL # BLD: 0.1 K/UL (ref 0–0.4)
EOSINOPHILS RELATIVE PERCENT: 1 % (ref 0–4)
ERYTHROCYTE [DISTWIDTH] IN BLOOD BY AUTOMATED COUNT: 13.1 % (ref 11.5–14.9)
FOLATE SERPL-MCNC: 13.5 NG/ML (ref 4.8–24.2)
GFR SERPL CREATININE-BSD FRML MDRD: >60 ML/MIN/1.73M2
GLUCOSE SERPL-MCNC: 107 MG/DL (ref 70–99)
HCT VFR BLD AUTO: 36.7 % (ref 36–46)
HDLC SERPL-MCNC: 53 MG/DL
HGB BLD-MCNC: 12.7 G/DL (ref 12–16)
LDLC SERPL CALC-MCNC: 143 MG/DL (ref 0–130)
LYMPHOCYTES NFR BLD: 2 K/UL (ref 1–4.8)
LYMPHOCYTES RELATIVE PERCENT: 33 % (ref 24–44)
MAGNESIUM SERPL-MCNC: 2.1 MG/DL (ref 1.6–2.6)
MCH RBC QN AUTO: 32.8 PG (ref 26–34)
MCHC RBC AUTO-ENTMCNC: 34.5 G/DL (ref 31–37)
MCV RBC AUTO: 95 FL (ref 80–100)
MONOCYTES NFR BLD: 0.5 K/UL (ref 0.1–1.3)
MONOCYTES NFR BLD: 8 % (ref 1–7)
NEUTROPHILS NFR BLD: 57 % (ref 36–66)
NEUTS SEG NFR BLD: 3.5 K/UL (ref 1.3–9.1)
PLATELET # BLD AUTO: 179 K/UL (ref 150–450)
PMV BLD AUTO: 8.9 FL (ref 6–12)
POTASSIUM SERPL-SCNC: 3.7 MMOL/L (ref 3.7–5.3)
PROT SERPL-MCNC: 6.2 G/DL (ref 6.4–8.3)
RBC # BLD AUTO: 3.86 M/UL (ref 4–5.2)
SODIUM SERPL-SCNC: 143 MMOL/L (ref 135–144)
TRIGL SERPL-MCNC: 75 MG/DL
TSH SERPL DL<=0.05 MIU/L-ACNC: 4.23 UIU/ML (ref 0.3–5)
URATE SERPL-MCNC: 5.6 MG/DL (ref 2.4–5.7)
VIT B12 SERPL-MCNC: 343 PG/ML (ref 232–1245)
WBC OTHER # BLD: 6 K/UL (ref 3.5–11)

## 2024-03-15 PROCEDURE — 80053 COMPREHEN METABOLIC PANEL: CPT

## 2024-03-15 PROCEDURE — 84443 ASSAY THYROID STIM HORMONE: CPT

## 2024-03-15 PROCEDURE — 84550 ASSAY OF BLOOD/URIC ACID: CPT

## 2024-03-15 PROCEDURE — 82746 ASSAY OF FOLIC ACID SERUM: CPT

## 2024-03-15 PROCEDURE — 82607 VITAMIN B-12: CPT

## 2024-03-15 PROCEDURE — 82330 ASSAY OF CALCIUM: CPT

## 2024-03-15 PROCEDURE — 82306 VITAMIN D 25 HYDROXY: CPT

## 2024-03-15 PROCEDURE — 36415 COLL VENOUS BLD VENIPUNCTURE: CPT

## 2024-03-15 PROCEDURE — 83735 ASSAY OF MAGNESIUM: CPT

## 2024-03-15 PROCEDURE — 80061 LIPID PANEL: CPT

## 2024-03-15 PROCEDURE — 85025 COMPLETE CBC W/AUTO DIFF WBC: CPT

## 2024-03-19 DIAGNOSIS — E78.2 MIXED HYPERLIPIDEMIA: ICD-10-CM

## 2024-03-19 RX ORDER — ATORVASTATIN CALCIUM 40 MG/1
40 TABLET, FILM COATED ORAL DAILY
Qty: 100 TABLET | Refills: 1 | Status: SHIPPED | OUTPATIENT
Start: 2024-03-19

## 2024-05-27 DIAGNOSIS — E55.9 VITAMIN D DEFICIENCY: ICD-10-CM

## 2024-05-28 RX ORDER — METHOCARBAMOL 750 MG/1
TABLET ORAL
Qty: 12 CAPSULE | Refills: 0 | Status: SHIPPED | OUTPATIENT
Start: 2024-05-28

## 2024-05-28 NOTE — TELEPHONE ENCOUNTER
Please Approve or Refuse.  Send to Pharmacy per Pt's Request:      Next Visit Date:  6/11/2024   Last Visit Date: 3/11/2024    Hemoglobin A1C (%)   Date Value   05/22/2023 5.2   10/18/2022 5.2   12/19/2016 5.3             ( goal A1C is < 7)   BP Readings from Last 3 Encounters:   03/11/24 120/70   01/05/24 (!) 151/70   12/11/23 128/86          (goal 120/80)  BUN   Date Value Ref Range Status   03/15/2024 12 8 - 23 mg/dL Final     Creatinine   Date Value Ref Range Status   03/15/2024 0.8 0.5 - 0.9 mg/dL Final     Potassium   Date Value Ref Range Status   03/15/2024 3.7 3.7 - 5.3 mmol/L Final

## 2024-06-07 DIAGNOSIS — I10 ESSENTIAL HYPERTENSION: ICD-10-CM

## 2024-06-07 RX ORDER — LISINOPRIL 40 MG/1
TABLET ORAL
Qty: 100 TABLET | Refills: 0 | Status: SHIPPED | OUTPATIENT
Start: 2024-06-07

## 2024-06-07 NOTE — TELEPHONE ENCOUNTER
Please Approve or Refuse.  Send to Pharmacy per Pt's Request: [facility]     Next Visit Date:  6/11/2024   Last Visit Date: 3/11/2024    Hemoglobin A1C (%)   Date Value   05/22/2023 5.2   10/18/2022 5.2   12/19/2016 5.3             ( goal A1C is < 7)   BP Readings from Last 3 Encounters:   03/11/24 120/70   01/05/24 (!) 151/70   12/11/23 128/86          (goal 120/80)  BUN   Date Value Ref Range Status   03/15/2024 12 8 - 23 mg/dL Final     Creatinine   Date Value Ref Range Status   03/15/2024 0.8 0.5 - 0.9 mg/dL Final     Potassium   Date Value Ref Range Status   03/15/2024 3.7 3.7 - 5.3 mmol/L Final

## 2024-07-09 DIAGNOSIS — M81.0 AGE-RELATED OSTEOPOROSIS WITHOUT CURRENT PATHOLOGICAL FRACTURE: ICD-10-CM

## 2024-07-09 RX ORDER — FLUTICASONE PROPIONATE 50 MCG
SPRAY, SUSPENSION (ML) NASAL
Qty: 16 G | Refills: 1 | Status: SHIPPED | OUTPATIENT
Start: 2024-07-09

## 2024-07-09 RX ORDER — RISEDRONATE SODIUM 150 MG/1
1 TABLET, FILM COATED ORAL
Qty: 30 TABLET | Refills: 0 | Status: SHIPPED | OUTPATIENT
Start: 2024-07-09

## 2024-07-09 NOTE — TELEPHONE ENCOUNTER
Maria L Cantu is calling to request a refill on the following medication(s):    Last Visit Date (If Applicable):  3/11/2024    Next Visit Date:    12/16/2024    Medication Request:  Requested Prescriptions     Pending Prescriptions Disp Refills    Risedronate Sodium 150 MG TABS 30 tablet 0     Sig: Take 1 tablet by mouth every 30 days Takes on the 9th of every month    fluticasone (FLONASE) 50 MCG/ACT nasal spray 16 g

## 2024-07-23 ENCOUNTER — OFFICE VISIT (OUTPATIENT)
Dept: FAMILY MEDICINE CLINIC | Age: 74
End: 2024-07-23
Payer: MEDICARE

## 2024-07-23 VITALS
OXYGEN SATURATION: 98 % | WEIGHT: 151 LBS | SYSTOLIC BLOOD PRESSURE: 126 MMHG | DIASTOLIC BLOOD PRESSURE: 84 MMHG | HEART RATE: 67 BPM | HEIGHT: 62 IN | BODY MASS INDEX: 27.79 KG/M2

## 2024-07-23 DIAGNOSIS — Z87.891 PERSONAL HISTORY OF TOBACCO USE: ICD-10-CM

## 2024-07-23 DIAGNOSIS — E55.9 VITAMIN D DEFICIENCY: ICD-10-CM

## 2024-07-23 DIAGNOSIS — Z53.20 COLONOSCOPY REFUSED: ICD-10-CM

## 2024-07-23 DIAGNOSIS — N39.3 STRESS INCONTINENCE OF URINE: ICD-10-CM

## 2024-07-23 DIAGNOSIS — M81.0 AGE-RELATED OSTEOPOROSIS WITHOUT CURRENT PATHOLOGICAL FRACTURE: ICD-10-CM

## 2024-07-23 DIAGNOSIS — E78.2 MIXED HYPERLIPIDEMIA: ICD-10-CM

## 2024-07-23 DIAGNOSIS — I10 ESSENTIAL HYPERTENSION: ICD-10-CM

## 2024-07-23 DIAGNOSIS — Z51.81 MEDICATION MONITORING ENCOUNTER: ICD-10-CM

## 2024-07-23 DIAGNOSIS — Z00.00 MEDICARE ANNUAL WELLNESS VISIT, SUBSEQUENT: Primary | ICD-10-CM

## 2024-07-23 PROBLEM — R41.82 AMS (ALTERED MENTAL STATUS): Status: RESOLVED | Noted: 2023-05-21 | Resolved: 2024-07-23

## 2024-07-23 PROCEDURE — 3079F DIAST BP 80-89 MM HG: CPT | Performed by: FAMILY MEDICINE

## 2024-07-23 PROCEDURE — G0296 VISIT TO DETERM LDCT ELIG: HCPCS | Performed by: FAMILY MEDICINE

## 2024-07-23 PROCEDURE — 3074F SYST BP LT 130 MM HG: CPT | Performed by: FAMILY MEDICINE

## 2024-07-23 PROCEDURE — 1123F ACP DISCUSS/DSCN MKR DOCD: CPT | Performed by: FAMILY MEDICINE

## 2024-07-23 PROCEDURE — G0439 PPPS, SUBSEQ VISIT: HCPCS | Performed by: FAMILY MEDICINE

## 2024-07-23 PROCEDURE — 99213 OFFICE O/P EST LOW 20 MIN: CPT | Performed by: FAMILY MEDICINE

## 2024-07-23 ASSESSMENT — ENCOUNTER SYMPTOMS
NAUSEA: 0
TROUBLE SWALLOWING: 0
VOMITING: 0
CHEST TIGHTNESS: 0
COLOR CHANGE: 0
SINUS PRESSURE: 0
COUGH: 0
DIARRHEA: 0
SHORTNESS OF BREATH: 0
ABDOMINAL PAIN: 0
ABDOMINAL DISTENTION: 0
RECTAL PAIN: 0
BLOOD IN STOOL: 0
STRIDOR: 0
EYE REDNESS: 0
WHEEZING: 0
RHINORRHEA: 0
CONSTIPATION: 0
BACK PAIN: 1
SORE THROAT: 0

## 2024-07-23 NOTE — PROGRESS NOTES
Medicare Annual Wellness Visit    Maria L Cantu is here for Medicare AWV    Assessment & Plan   Medicare annual wellness visit, subsequent  Wellness check done problems addressed  Essential hypertension  Controlled, discussed with patient we had to monitor kidney function potassium.  Continue current treatment.  -     Basic Metabolic Panel; Future  Mixed hyperlipidemia  Not well-controlled, repeat lipid panel continue statins  -     Lipid Panel; Future  Age-related osteoporosis without current pathological fracture  Continue Boniva discontinue vitamin D due to normal levels  Vitamin D deficiency  Recent levels are within normal range discontinue vitamin D.  Stress incontinence of urine  Medication monitoring encounter  -     Basic Metabolic Panel; Future  Colonoscopy refused  Patient refused in spite of discussing about the importance of colon cancer screening.  Personal history of tobacco use  -     [place] VISIT TO DISCUSS LUNG CA SCREEN W LDCT    Recommendations for Preventive Services Due: see orders and patient instructions/AVS.  Recommended screening schedule for the next 5-10 years is provided to the patient in written form: see Patient Instructions/AVS.     Return in about 6 months (around 1/23/2025) for 15 MIN SARITA, chronic conditions.     Subjective   The following acute and/or chronic problems were also addressed today:    Patient is scheduled for Medicare wellness visit and for chronic medical problems.    Hypertension controlled patient currently on endocrine lisinopril follows with cardiologist.  Previous BMP check was in March, potassium was within normal range.  Discussed with patient we had to monitor your kidney function and also potassium levels.    Hyperlipidemia uncontrolled on previous blood work LDL was high patient was noncompliant with the medications.  She is currently on Lipitor 40 mg will recheck lipid panel .    Osteoporosis is currently on Boniva and vitamin D supplements.  Patient's recent

## 2024-07-23 NOTE — PROGRESS NOTES
Visit Information    Have you changed or started any medications since your last visit including any over-the-counter medicines, vitamins, or herbal medicines? no   Are you having any side effects from any of your medications? -  no  Have you stopped taking any of your medications? Is so, why? -  no    Have you seen any other physician or provider since your last visit? No  Have you had any other diagnostic tests since your last visit? No  Have you been seen in the emergency room and/or had an admission to a hospital since we last saw you? No  Have you had your routine dental cleaning in the past 6 months?     Have you activated your [employer] account? If not, what are your barriers? Yes     Patient Care Team:  Stephanie Scherer MD as PCP - General (Family Medicine)  Stephanie Scherer MD as PCP - Empaneled Provider  Markell Gibbs MD as Surgeon (Otolaryngology)    Medical History Review  Past Medical, Family, and Social History reviewed and does contribute to the patient presenting condition    Health Maintenance   Topic Date Due    COVID-19 Vaccine (1) Never done    Shingles vaccine (1 of 2) Never done    Respiratory Syncytial Virus (RSV) Pregnant or age 60 yrs+ (1 - 1-dose 60+ series) Never done    Lung Cancer Screening &/or Counseling  10/27/2023    Colorectal Cancer Screen  11/21/2023    Annual Wellness Visit (Medicare Advantage)  01/01/2024    Depression Screen  03/11/2025    Lipids  03/15/2025    DTaP/Tdap/Td vaccine (2 - Td or Tdap) 04/04/2028    DEXA (modify frequency per FRAX score)  Completed    Pneumococcal 65+ years Vaccine  Completed    Hepatitis C screen  Completed    Hepatitis A vaccine  Aged Out    Hepatitis B vaccine  Aged Out    Hib vaccine  Aged Out    Polio vaccine  Aged Out    Meningococcal (ACWY) vaccine  Aged Out    Breast cancer screen  Discontinued

## 2024-07-23 NOTE — PATIENT INSTRUCTIONS
Learning About Being Active as an Older Adult  Why is being active important as you get older?     Being active is one of the best things you can do for your health. And it's never too late to start. Being active--or getting active, if you aren't already--has definite benefits. It can:  Give you more energy,  Keep your mind sharp.  Improve balance to reduce your risk of falls.  Help you manage chronic illness with fewer medicines.  No matter how old you are, how fit you are, or what health problems you have, there is a form of activity that will work for you. And the more physical activity you can do, the better your overall health will be.  What kinds of activity can help you stay healthy?  Being more active will make your daily activities easier. Physical activity includes planned exercise and things you do in daily life. There are four types of activity:  Aerobic.  Doing aerobic activity makes your heart and lungs strong.  Includes walking, dancing, and gardening.  Aim for at least 2½ hours spread throughout the week.  It improves your energy and can help you sleep better.  Muscle-strengthening.  This type of activity can help maintain muscle and strengthen bones.  Includes climbing stairs, using resistance bands, and lifting or carrying heavy loads.  Aim for at least twice a week.  It can help protect the knees and other joints.  Stretching.  Stretching gives you better range of motion in joints and muscles.  Includes upper arm stretches, calf stretches, and gentle yoga.  Aim for at least twice a week, preferably after your muscles are warmed up from other activities.  It can help you function better in daily life.  Balancing.  This helps you stay coordinated and have good posture.  Includes heel-to-toe walking, kareen chi, and certain types of yoga.  Aim for at least 3 days a week.  It can reduce your risk of falling.  Even if you have a hard time meeting the recommendations, it's better to be more active

## 2024-07-26 DIAGNOSIS — I10 ESSENTIAL HYPERTENSION: ICD-10-CM

## 2024-07-26 RX ORDER — SPIRONOLACTONE 25 MG/1
25 TABLET ORAL DAILY
Qty: 30 TABLET | Refills: 3 | Status: SHIPPED | OUTPATIENT
Start: 2024-07-26

## 2024-07-26 NOTE — TELEPHONE ENCOUNTER
PT CAME INTO OFFICE STATED THAT SHE HAS NEVER PICKED UP SCRIPT AND MEDICATION WAS NEVER RECEIVED BY PHARMACY       Please Approve or Refuse.  Send to Pharmacy per Pt's Request: [facility]      Next Visit Date:  Visit date not found   Last Visit Date: 7/23/2024    Hemoglobin A1C (%)   Date Value   05/22/2023 5.2   10/18/2022 5.2   12/19/2016 5.3             ( goal A1C is < 7)   BP Readings from Last 3 Encounters:   07/23/24 126/84   03/11/24 120/70   01/05/24 (!) 151/70          (goal 120/80)  BUN   Date Value Ref Range Status   03/15/2024 12 8 - 23 mg/dL Final     Creatinine   Date Value Ref Range Status   03/15/2024 0.8 0.5 - 0.9 mg/dL Final     Potassium   Date Value Ref Range Status   03/15/2024 3.7 3.7 - 5.3 mmol/L Final

## 2024-08-07 ENCOUNTER — HOSPITAL ENCOUNTER (OUTPATIENT)
Age: 74
Discharge: HOME | End: 2024-08-07
Payer: MEDICARE

## 2024-08-07 DIAGNOSIS — E55.9 VITAMIN D DEFICIENCY: ICD-10-CM

## 2024-08-07 DIAGNOSIS — M81.0 AGE-RELATED OSTEOPOROSIS WITHOUT CURRENT PATHOLOGICAL FRACTURE: Primary | ICD-10-CM

## 2024-08-07 DIAGNOSIS — Z51.81 MEDICATION MONITORING ENCOUNTER: ICD-10-CM

## 2024-08-07 DIAGNOSIS — I10 ESSENTIAL HYPERTENSION: ICD-10-CM

## 2024-08-07 LAB
ANION GAP SERPL CALCULATED.3IONS-SCNC: 11 MMOL/L (ref 9–17)
BUN SERPL-MCNC: 11 MG/DL (ref 8–23)
CALCIUM SERPL-MCNC: 8.4 MG/DL (ref 8.6–10.4)
CHLORIDE SERPL-SCNC: 106 MMOL/L (ref 98–107)
CHOLEST SERPL-MCNC: 192 MG/DL
CHOLESTEROL/HDL RATIO: 4.2
CO2 SERPL-SCNC: 25 MMOL/L (ref 20–31)
CREAT SERPL-MCNC: 0.7 MG/DL (ref 0.5–0.9)
GFR, ESTIMATED: >90 ML/MIN/1.73M2
GLUCOSE SERPL-MCNC: 124 MG/DL (ref 70–99)
HDLC SERPL-MCNC: 46 MG/DL
LDLC SERPL CALC-MCNC: 123 MG/DL (ref 0–130)
POTASSIUM SERPL-SCNC: 4.1 MMOL/L (ref 3.7–5.3)
SODIUM SERPL-SCNC: 142 MMOL/L (ref 135–144)
TRIGL SERPL-MCNC: 113 MG/DL

## 2024-08-07 PROCEDURE — 36415 COLL VENOUS BLD VENIPUNCTURE: CPT

## 2024-08-07 PROCEDURE — 80061 LIPID PANEL: CPT

## 2024-08-07 PROCEDURE — 80048 BASIC METABOLIC PNL TOTAL CA: CPT

## 2024-08-07 RX ORDER — CALCIUM CARBONATE/VITAMIN D3 500MG-5MCG
TABLET ORAL
Qty: 90 TABLET | Refills: 1 | Status: SHIPPED | OUTPATIENT
Start: 2024-08-07

## 2024-08-17 ENCOUNTER — OFFICE VISIT (OUTPATIENT)
Dept: FAMILY MEDICINE CLINIC | Age: 74
End: 2024-08-17

## 2024-08-17 VITALS
TEMPERATURE: 98.3 F | HEART RATE: 80 BPM | DIASTOLIC BLOOD PRESSURE: 83 MMHG | OXYGEN SATURATION: 99 % | SYSTOLIC BLOOD PRESSURE: 149 MMHG

## 2024-08-17 DIAGNOSIS — H72.92 ACUTE OTITIS MEDIA OF LEFT EAR WITH PERFORATION: Primary | ICD-10-CM

## 2024-08-17 DIAGNOSIS — H66.92 ACUTE OTITIS MEDIA OF LEFT EAR WITH PERFORATION: Primary | ICD-10-CM

## 2024-08-17 DIAGNOSIS — H66.91 ACUTE RIGHT OTITIS MEDIA: ICD-10-CM

## 2024-08-17 RX ORDER — MOXIFLOXACIN HYDROCHLORIDE 400 MG/1
400 TABLET ORAL DAILY
Qty: 7 TABLET | Refills: 0 | Status: SHIPPED | OUTPATIENT
Start: 2024-08-17 | End: 2024-08-17 | Stop reason: RX

## 2024-08-17 RX ORDER — BENZONATATE 100 MG/1
100 CAPSULE ORAL 3 TIMES DAILY PRN
Qty: 30 CAPSULE | Refills: 0 | Status: SHIPPED | OUTPATIENT
Start: 2024-08-17 | End: 2024-09-16

## 2024-08-17 RX ORDER — LEVOFLOXACIN 500 MG/1
500 TABLET, FILM COATED ORAL DAILY
Qty: 7 TABLET | Refills: 0 | Status: SHIPPED | OUTPATIENT
Start: 2024-08-17 | End: 2024-08-24

## 2024-08-17 RX ORDER — NAPROXEN 250 MG/1
250 TABLET ORAL 2 TIMES DAILY WITH MEALS
Qty: 30 TABLET | Refills: 0 | Status: SHIPPED | OUTPATIENT
Start: 2024-08-17 | End: 2024-09-16

## 2024-08-17 RX ORDER — CIPROFLOXACIN AND DEXAMETHASONE 3; 1 MG/ML; MG/ML
4 SUSPENSION/ DROPS AURICULAR (OTIC) 2 TIMES DAILY
Qty: 1 EACH | Refills: 0 | Status: SHIPPED | OUTPATIENT
Start: 2024-08-17 | End: 2024-08-24

## 2024-08-17 ASSESSMENT — ENCOUNTER SYMPTOMS
SORE THROAT: 1
SHORTNESS OF BREATH: 0
COUGH: 1
WHEEZING: 0

## 2024-08-17 NOTE — PROGRESS NOTES
Jenny Pires MD  [facility], [facility] WALK-IN FAM[facility]IC[facility]  [id number]   [address]  [facility] [address]  Dept: [phone]    Maria L Cantu is a 73 y.o. female who presents today for their medical conditions/complaints as noted below.  Maria L Cantu is here today c/o Cough (Onset 5 days ) and Otalgia       HPI:     HPI    Patient presents to the walk-in clinic for evaluation of bilateral ear pressure that began 6 days ago  Last night the left ear developed intense pain and she noticed some scant bloody discharge, no purulent fluid from the left ear, mild pain at the right ear with no discharge, crackling sensation on the left, decreased hearing and tinnitus bilaterally  Using Tylenol for the discomfort  Endorses rhinorrhea, productive cough, throat discomfort, diaphoresis  On the first day of her symptoms she had some vomiting and none since then  On the first 3 days of her symptoms she had some diarrhea none since then, no blood in the stool  No history of asthma, she is unsure if she has a history of COPD, on albuterol as needed  Denies fever, wheezing, dyspnea, congestion  Granddaughter was recently sick with a viral URI  Patient declined COVID testing today in clinic    BP Readings from Last 3 Encounters:   08/17/24 (!) 149/83   07/23/24 126/84   03/11/24 120/70       Patient Active Problem List   Diagnosis    Essential hypertension    Smoking    Age-related osteoporosis without current pathological fracture    Acute recurrent maxillary sinusitis    Stress incontinence of urine    Personal history of tobacco use    At high risk for falls    S/P laparoscopic cholecystectomy    Chronic cholecystitis with calculus    Sprain of left rotator cuff capsule    Mixed hyperlipidemia    Chronic midline low back pain with bilateral sciatica    Allergic rhinitis due to allergen    Vitamin D deficiency    Stress at home    Adenomatous polyp    Bilateral hearing

## 2024-08-30 RX ORDER — FLUTICASONE PROPIONATE 50 MCG
SPRAY, SUSPENSION (ML) NASAL
Qty: 16 G | Refills: 0 | Status: SHIPPED | OUTPATIENT
Start: 2024-08-30

## 2024-08-30 NOTE — TELEPHONE ENCOUNTER
Please Approve or Refuse.  Send to Pharmacy per Pt's Request:      Next Visit Date:  1/23/2025   Last Visit Date: 7/23/2024    Hemoglobin A1C (%)   Date Value   05/22/2023 5.2   10/18/2022 5.2   12/19/2016 5.3             ( goal A1C is < 7)   BP Readings from Last 3 Encounters:   08/17/24 (!) 149/83   07/23/24 126/84   03/11/24 120/70          (goal 120/80)  BUN   Date Value Ref Range Status   08/07/2024 11 8 - 23 mg/dL Final     Creatinine   Date Value Ref Range Status   08/07/2024 0.7 0.5 - 0.9 mg/dL Final     Potassium   Date Value Ref Range Status   08/07/2024 4.1 3.7 - 5.3 mmol/L Final

## 2024-09-12 DIAGNOSIS — I10 ESSENTIAL HYPERTENSION: ICD-10-CM

## 2024-09-12 RX ORDER — LISINOPRIL 40 MG/1
TABLET ORAL
Qty: 100 TABLET | Refills: 0 | Status: SHIPPED | OUTPATIENT
Start: 2024-09-12

## 2024-09-12 RX ORDER — FLUTICASONE PROPIONATE 50 MCG
SPRAY, SUSPENSION (ML) NASAL
Qty: 16 G | Refills: 0 | Status: SHIPPED | OUTPATIENT
Start: 2024-09-12

## 2024-09-30 DIAGNOSIS — E78.2 MIXED HYPERLIPIDEMIA: ICD-10-CM

## 2024-09-30 RX ORDER — ATORVASTATIN CALCIUM 40 MG/1
40 TABLET, FILM COATED ORAL DAILY
Qty: 100 TABLET | Refills: 0 | Status: SHIPPED | OUTPATIENT
Start: 2024-09-30

## 2024-09-30 NOTE — TELEPHONE ENCOUNTER
Please Approve or Refuse.  Send to Pharmacy per Pt's Request: [facility]      Next Visit Date:  1/23/2025   Last Visit Date: 7/23/2024    Hemoglobin A1C (%)   Date Value   05/22/2023 5.2   10/18/2022 5.2   12/19/2016 5.3             ( goal A1C is < 7)   BP Readings from Last 3 Encounters:   08/17/24 (!) 149/83   07/23/24 126/84   03/11/24 120/70          (goal 120/80)  BUN   Date Value Ref Range Status   08/07/2024 11 8 - 23 mg/dL Final     Creatinine   Date Value Ref Range Status   08/07/2024 0.7 0.5 - 0.9 mg/dL Final     Potassium   Date Value Ref Range Status   08/07/2024 4.1 3.7 - 5.3 mmol/L Final

## 2024-10-30 NOTE — TELEPHONE ENCOUNTER
Incoming fax from [facility]    INR of 2.4 today   Please Approve or Refuse.  Send to Pharmacy per Pt's Request: [facility]     Next Visit Date:  1/23/2025   Last Visit Date: 7/23/2024    Hemoglobin A1C (%)   Date Value   05/22/2023 5.2   10/18/2022 5.2   12/19/2016 5.3             ( goal A1C is < 7)   BP Readings from Last 3 Encounters:   08/17/24 (!) 149/83   07/23/24 126/84   03/11/24 120/70          (goal 120/80)  BUN   Date Value Ref Range Status   08/07/2024 11 8 - 23 mg/dL Final     Creatinine   Date Value Ref Range Status   08/07/2024 0.7 0.5 - 0.9 mg/dL Final     Potassium   Date Value Ref Range Status   08/07/2024 4.1 3.7 - 5.3 mmol/L Final

## 2024-10-31 RX ORDER — FLUTICASONE PROPIONATE 50 MCG
SPRAY, SUSPENSION (ML) NASAL
Qty: 16 G | Refills: 0 | Status: SHIPPED | OUTPATIENT
Start: 2024-10-31

## 2024-11-25 RX ORDER — FLUTICASONE PROPIONATE 50 MCG
SPRAY, SUSPENSION (ML) NASAL
Qty: 16 G | Refills: 0 | Status: SHIPPED | OUTPATIENT
Start: 2024-11-25

## 2024-12-03 DIAGNOSIS — Z20.822 SUSPECTED COVID-19 VIRUS INFECTION: ICD-10-CM

## 2024-12-03 RX ORDER — IBUPROFEN 800 MG/1
800 TABLET, FILM COATED ORAL EVERY 6 HOURS PRN
Qty: 20 TABLET | Refills: 0 | OUTPATIENT
Start: 2024-12-03

## 2024-12-09 RX ORDER — FLUTICASONE PROPIONATE 50 MCG
SPRAY, SUSPENSION (ML) NASAL
Qty: 16 G | Refills: 0 | Status: SHIPPED | OUTPATIENT
Start: 2024-12-09

## 2024-12-13 ENCOUNTER — OFFICE VISIT (OUTPATIENT)
Dept: FAMILY MEDICINE CLINIC | Age: 74
End: 2024-12-13

## 2024-12-13 VITALS
BODY MASS INDEX: 27.79 KG/M2 | OXYGEN SATURATION: 98 % | DIASTOLIC BLOOD PRESSURE: 80 MMHG | HEIGHT: 62 IN | TEMPERATURE: 97.6 F | HEART RATE: 71 BPM | SYSTOLIC BLOOD PRESSURE: 138 MMHG | WEIGHT: 151 LBS

## 2024-12-13 DIAGNOSIS — Z87.891 PERSONAL HISTORY OF TOBACCO USE: ICD-10-CM

## 2024-12-13 DIAGNOSIS — M54.41 CHRONIC MIDLINE LOW BACK PAIN WITH BILATERAL SCIATICA: ICD-10-CM

## 2024-12-13 DIAGNOSIS — G89.29 CHRONIC MIDLINE LOW BACK PAIN WITH BILATERAL SCIATICA: ICD-10-CM

## 2024-12-13 DIAGNOSIS — J40 BRONCHITIS: ICD-10-CM

## 2024-12-13 DIAGNOSIS — M54.42 CHRONIC MIDLINE LOW BACK PAIN WITH BILATERAL SCIATICA: ICD-10-CM

## 2024-12-13 DIAGNOSIS — J44.9 CHRONIC OBSTRUCTIVE PULMONARY DISEASE, UNSPECIFIED COPD TYPE (HCC): Primary | ICD-10-CM

## 2024-12-13 RX ORDER — GUAIFENESIN 600 MG/1
600 TABLET, EXTENDED RELEASE ORAL 2 TIMES DAILY
Qty: 30 TABLET | Refills: 0 | Status: SHIPPED | OUTPATIENT
Start: 2024-12-13

## 2024-12-13 RX ORDER — METHYLPREDNISOLONE 4 MG/1
TABLET ORAL
Qty: 1 KIT | Refills: 0 | Status: SHIPPED | OUTPATIENT
Start: 2024-12-13 | End: 2024-12-19

## 2024-12-13 RX ORDER — FLUTICASONE PROPIONATE AND SALMETEROL 100; 50 UG/1; UG/1
1 POWDER RESPIRATORY (INHALATION) EVERY 12 HOURS
Qty: 1 EACH | Refills: 1 | Status: SHIPPED | OUTPATIENT
Start: 2024-12-13

## 2024-12-13 RX ORDER — DOXYCYCLINE HYCLATE 100 MG
100 TABLET ORAL 2 TIMES DAILY
Qty: 14 TABLET | Refills: 0 | Status: SHIPPED | OUTPATIENT
Start: 2024-12-13 | End: 2024-12-20

## 2024-12-13 RX ORDER — IBUPROFEN 400 MG/1
400 TABLET, FILM COATED ORAL 2 TIMES DAILY PRN
Qty: 60 TABLET | Refills: 0 | Status: SHIPPED | OUTPATIENT
Start: 2024-12-13

## 2024-12-13 ASSESSMENT — ENCOUNTER SYMPTOMS
COUGH: 1
CONSTIPATION: 0
CHEST TIGHTNESS: 1
NAUSEA: 0
BLOOD IN STOOL: 0
SORE THROAT: 0
VOMITING: 0
WHEEZING: 1
TROUBLE SWALLOWING: 0
RHINORRHEA: 1
STRIDOR: 0
ABDOMINAL PAIN: 0
SHORTNESS OF BREATH: 0
DIARRHEA: 0
SINUS PRESSURE: 1
BACK PAIN: 1
ABDOMINAL DISTENTION: 0
COLOR CHANGE: 0
EYE REDNESS: 0
RECTAL PAIN: 0

## 2024-12-13 NOTE — PROGRESS NOTES
Visit Information    Have you changed or started any medications since your last visit including any over-the-counter medicines, vitamins, or herbal medicines? no   Have you stopped taking any of your medications? Is so, why? -  no  Are you having any side effects from any of your medications? - no    Have you seen any other physician or provider since your last visit?  no   Have you had any other diagnostic tests since your last visit?  no   Have you been seen in the emergency room and/or had an admission in a hospital since we last saw you?  no   Have you had your routine dental cleaning in the past 6 months?  no     Do you have an active MyChart account? If no, what is the barrier?  Yes    Patient Care Team:  Stephanie Scherer MD as PCP - General (Family Medicine)  Stephanie Scherer MD as PCP - Empaneled Provider  Markell Gibbs MD as Surgeon (Otolaryngology)    Medical History Review  Past Medical, Family, and Social History reviewed and does contribute to the patient presenting condition    Health Maintenance   Topic Date Due    Shingles vaccine (1 of 2) Never done    Respiratory Syncytial Virus (RSV) Pregnant or age 60 yrs+ (1 - 1-dose 75+ series) Never done    Lung Cancer Screening &/or Counseling  10/27/2023    COVID-19 Vaccine (1 - 2023-24 season) Never done    Depression Screen  07/23/2025    Lipids  08/07/2025    DTaP/Tdap/Td vaccine (2 - Td or Tdap) 04/04/2028    DEXA (modify frequency per FRAX score)  Completed    Annual Wellness Visit (Medicare Advantage)  Completed    Pneumococcal 65+ years Vaccine  Completed    Hepatitis C screen  Completed    Hepatitis A vaccine  Aged Out    Hepatitis B vaccine  Aged Out    Hib vaccine  Aged Out    Polio vaccine  Aged Out    Meningococcal (ACWY) vaccine  Aged Out    Breast cancer screen  Discontinued    Colorectal Cancer Screen  Discontinued               
procedure, please place the appropriate lab order     Standing Status:   Future     Standing Expiration Date:   12/13/2025    MD VISIT TO DISCUSS LUNG CA SCREEN W LDCT         There are no discontinued medications.    Aleah received counseling on the following healthy behaviors: nutrition, exercise, and medication adherence  Reviewed prior labs and health maintenance.  Continue current medications, diet and exercise.  Discussed use, benefit, and side effects of prescribed medications. Barriers to medication compliance addressed.   Patient given educational materials - see patient instructions.    All patient questions answered.  Patient voiced understanding.     The patient'spast medical, surgical, social, and family history as well as her   current medications and allergies were reviewed as documented in today's encounter.      Medications, labs, diagnostic studies, consultations andfollow-up as documented in this encounter.    Return in about 6 weeks (around 1/24/2025) for copd.    Patient wasseen with total face to face time of 35 minutes. More than 50% of this visit was counseling and education.       Future Appointments   Date Time Provider Department Center   1/28/2025 10:30 AM Stephanie Scherer MD [facility]     This note was completed by using the assistance of a speech-recognition program. However, inadvertent computerized transcription errors may be present. Althoughevery effort was made to ensure accuracy, no guarantees can be provided that every mistake has been identified and corrected by editing.  Electronically signed by Stephanie Scherer MD on 12/13/2024  9:47 AM            Discussed with the patient the current USPSTF guidelines released March 9, 2021 for screening for lung cancer.    For adults aged 50 to 80 years who have a 20 pack-year smoking history and currently smoke or have quit within the past 15 years the grade B recommendation is to:  Screen for lung cancer with low-dose computed

## 2024-12-19 ENCOUNTER — OFFICE VISIT (OUTPATIENT)
Dept: FAMILY MEDICINE CLINIC | Age: 74
End: 2024-12-19

## 2024-12-19 VITALS
SYSTOLIC BLOOD PRESSURE: 139 MMHG | TEMPERATURE: 97.9 F | WEIGHT: 151 LBS | BODY MASS INDEX: 27.62 KG/M2 | OXYGEN SATURATION: 98 % | DIASTOLIC BLOOD PRESSURE: 74 MMHG | HEART RATE: 87 BPM

## 2024-12-19 DIAGNOSIS — M77.8 TENDINITIS OF THUMB: ICD-10-CM

## 2024-12-19 DIAGNOSIS — H66.93 BILATERAL ACUTE OTITIS MEDIA: Primary | ICD-10-CM

## 2024-12-19 DIAGNOSIS — I10 ESSENTIAL HYPERTENSION: ICD-10-CM

## 2024-12-19 RX ORDER — LEVOFLOXACIN 500 MG/1
500 TABLET, FILM COATED ORAL DAILY
Qty: 7 TABLET | Refills: 0 | Status: SHIPPED | OUTPATIENT
Start: 2024-12-19 | End: 2024-12-26

## 2024-12-19 RX ORDER — LISINOPRIL 40 MG/1
TABLET ORAL
Qty: 100 TABLET | Refills: 3 | Status: SHIPPED | OUTPATIENT
Start: 2024-12-19

## 2024-12-19 ASSESSMENT — ENCOUNTER SYMPTOMS
DIARRHEA: 0
RHINORRHEA: 1
SORE THROAT: 0
VOMITING: 0
ABDOMINAL PAIN: 0
COUGH: 0

## 2024-12-19 NOTE — PROGRESS NOTES
There is no impacted cerumen. No foreign body. Tympanic membrane is injected and bulging. Tympanic membrane is not scarred, perforated or erythematous.      Nose: Nose normal. No nasal tenderness, congestion or rhinorrhea.      Right Sinus: No maxillary sinus tenderness or frontal sinus tenderness.      Left Sinus: No maxillary sinus tenderness or frontal sinus tenderness.      Mouth/Throat:      Lips: Pink.      Mouth: Mucous membranes are moist.      Pharynx: Postnasal drip present. No pharyngeal swelling, oropharyngeal exudate or posterior oropharyngeal erythema.   Eyes:      General:         Right eye: No discharge.         Left eye: No discharge.      Extraocular Movements: Extraocular movements intact.      Conjunctiva/sclera: Conjunctivae normal.      Pupils: Pupils are equal, round, and reactive to light.   Cardiovascular:      Rate and Rhythm: Normal rate and regular rhythm.   Pulmonary:      Effort: Pulmonary effort is normal.      Breath sounds: Normal breath sounds.   Musculoskeletal:         General: Tenderness present. No swelling, deformity or signs of injury.      Right hand: Tenderness and bony tenderness present. No swelling, deformity or lacerations. Normal range of motion. Normal strength. Normal sensation. Normal capillary refill.      Cervical back: Neck supple. No rigidity.      Right lower leg: No edema.      Left lower leg: No edema.   Lymphadenopathy:      Cervical: No cervical adenopathy.   Skin:     General: Skin is warm and dry.      Findings: No erythema or rash.   Neurological:      Mental Status: She is alert and oriented to person, place, and time.      Motor: No weakness.      Coordination: Coordination normal.      Gait: Gait normal.   Psychiatric:         Mood and Affect: Mood normal.         Behavior: Behavior normal. Behavior is cooperative.         Thought Content: Thought content normal.         Judgment: Judgment normal.       /74   Pulse 87   Temp 97.9 °F (36.6 °C)

## 2025-01-05 DIAGNOSIS — E78.2 MIXED HYPERLIPIDEMIA: ICD-10-CM

## 2025-01-06 RX ORDER — ATORVASTATIN CALCIUM 40 MG/1
40 TABLET, FILM COATED ORAL DAILY
Qty: 100 TABLET | Refills: 3 | Status: SHIPPED | OUTPATIENT
Start: 2025-01-06

## 2025-01-23 RX ORDER — FLUTICASONE PROPIONATE 50 MCG
SPRAY, SUSPENSION (ML) NASAL
Qty: 16 G | Refills: 3 | Status: SHIPPED | OUTPATIENT
Start: 2025-01-23

## 2025-02-02 DIAGNOSIS — J44.9 CHRONIC OBSTRUCTIVE PULMONARY DISEASE, UNSPECIFIED COPD TYPE (HCC): ICD-10-CM

## 2025-02-02 NOTE — TELEPHONE ENCOUNTER
Please Approve or Refuse.  Send to Pharmacy per Pt's Request:      Next Visit Date:  Visit date not found   Last Visit Date: 12/13/2024    Hemoglobin A1C (%)   Date Value   05/22/2023 5.2   10/18/2022 5.2   12/19/2016 5.3             ( goal A1C is < 7)   BP Readings from Last 3 Encounters:   12/19/24 139/74   12/13/24 138/80   08/17/24 (!) 149/83          (goal 120/80)  BUN   Date Value Ref Range Status   08/07/2024 11 8 - 23 mg/dL Final     Creatinine   Date Value Ref Range Status   08/07/2024 0.7 0.5 - 0.9 mg/dL Final     Potassium   Date Value Ref Range Status   08/07/2024 4.1 3.7 - 5.3 mmol/L Final

## 2025-02-03 RX ORDER — FLUTICASONE PROPIONATE AND SALMETEROL 100; 50 UG/1; UG/1
POWDER RESPIRATORY (INHALATION)
Qty: 2 EACH | Refills: 0 | Status: SHIPPED | OUTPATIENT
Start: 2025-02-03

## 2025-04-02 DIAGNOSIS — J44.9 CHRONIC OBSTRUCTIVE PULMONARY DISEASE, UNSPECIFIED COPD TYPE (HCC): ICD-10-CM

## 2025-04-02 RX ORDER — FLUTICASONE PROPIONATE AND SALMETEROL 100; 50 UG/1; UG/1
POWDER RESPIRATORY (INHALATION)
Qty: 60 EACH | Refills: 5 | Status: SHIPPED | OUTPATIENT
Start: 2025-04-02

## 2025-05-28 ENCOUNTER — TELEPHONE (OUTPATIENT)
Dept: FAMILY MEDICINE CLINIC | Age: 75
End: 2025-05-28

## 2025-08-06 ENCOUNTER — OFFICE VISIT (OUTPATIENT)
Dept: FAMILY MEDICINE CLINIC | Age: 75
End: 2025-08-06

## 2025-08-06 VITALS
SYSTOLIC BLOOD PRESSURE: 130 MMHG | HEIGHT: 62 IN | HEART RATE: 63 BPM | DIASTOLIC BLOOD PRESSURE: 70 MMHG | OXYGEN SATURATION: 97 % | WEIGHT: 148 LBS | BODY MASS INDEX: 27.23 KG/M2

## 2025-08-06 DIAGNOSIS — M81.0 AGE-RELATED OSTEOPOROSIS WITHOUT CURRENT PATHOLOGICAL FRACTURE: ICD-10-CM

## 2025-08-06 DIAGNOSIS — Z00.00 MEDICARE ANNUAL WELLNESS VISIT, SUBSEQUENT: Primary | ICD-10-CM

## 2025-08-06 DIAGNOSIS — M54.41 CHRONIC MIDLINE LOW BACK PAIN WITH BILATERAL SCIATICA: ICD-10-CM

## 2025-08-06 DIAGNOSIS — J44.9 CHRONIC OBSTRUCTIVE PULMONARY DISEASE, UNSPECIFIED COPD TYPE (HCC): ICD-10-CM

## 2025-08-06 DIAGNOSIS — Z87.891 PERSONAL HISTORY OF TOBACCO USE: ICD-10-CM

## 2025-08-06 DIAGNOSIS — Z71.89 ACP (ADVANCE CARE PLANNING): ICD-10-CM

## 2025-08-06 DIAGNOSIS — E78.2 MIXED HYPERLIPIDEMIA: ICD-10-CM

## 2025-08-06 DIAGNOSIS — I10 ESSENTIAL HYPERTENSION: ICD-10-CM

## 2025-08-06 DIAGNOSIS — G89.29 CHRONIC MIDLINE LOW BACK PAIN WITH BILATERAL SCIATICA: ICD-10-CM

## 2025-08-06 DIAGNOSIS — M54.42 CHRONIC MIDLINE LOW BACK PAIN WITH BILATERAL SCIATICA: ICD-10-CM

## 2025-08-06 DIAGNOSIS — Z78.0 POST-MENOPAUSAL: ICD-10-CM

## 2025-08-06 PROBLEM — Z90.49 S/P LAPAROSCOPIC CHOLECYSTECTOMY: Status: RESOLVED | Noted: 2021-07-21 | Resolved: 2025-08-06

## 2025-08-06 PROBLEM — J40 BRONCHITIS: Status: RESOLVED | Noted: 2024-12-13 | Resolved: 2025-08-06

## 2025-08-06 PROBLEM — F43.9 STRESS AT HOME: Status: RESOLVED | Noted: 2023-05-25 | Resolved: 2025-08-06

## 2025-08-06 PROBLEM — J01.01 ACUTE RECURRENT MAXILLARY SINUSITIS: Status: RESOLVED | Noted: 2020-02-13 | Resolved: 2025-08-06

## 2025-08-06 PROBLEM — E55.9 VITAMIN D DEFICIENCY: Status: RESOLVED | Noted: 2022-09-28 | Resolved: 2025-08-06

## 2025-08-06 SDOH — ECONOMIC STABILITY: FOOD INSECURITY: WITHIN THE PAST 12 MONTHS, YOU WORRIED THAT YOUR FOOD WOULD RUN OUT BEFORE YOU GOT MONEY TO BUY MORE.: NEVER TRUE

## 2025-08-06 SDOH — ECONOMIC STABILITY: FOOD INSECURITY: WITHIN THE PAST 12 MONTHS, THE FOOD YOU BOUGHT JUST DIDN'T LAST AND YOU DIDN'T HAVE MONEY TO GET MORE.: NEVER TRUE

## 2025-08-06 ASSESSMENT — ENCOUNTER SYMPTOMS
WHEEZING: 0
RECTAL PAIN: 0
SORE THROAT: 0
NAUSEA: 0
BACK PAIN: 1
RHINORRHEA: 0
STRIDOR: 0
BLOOD IN STOOL: 0
ABDOMINAL PAIN: 0
TROUBLE SWALLOWING: 0
ABDOMINAL DISTENTION: 0
COUGH: 0
VOMITING: 0
CHEST TIGHTNESS: 0
CONSTIPATION: 0
EYE REDNESS: 0
COLOR CHANGE: 0
SHORTNESS OF BREATH: 0
SINUS PRESSURE: 0
DIARRHEA: 0

## 2025-08-06 ASSESSMENT — PATIENT HEALTH QUESTIONNAIRE - PHQ9
SUM OF ALL RESPONSES TO PHQ QUESTIONS 1-9: 0
1. LITTLE INTEREST OR PLEASURE IN DOING THINGS: NOT AT ALL
SUM OF ALL RESPONSES TO PHQ QUESTIONS 1-9: 0
SUM OF ALL RESPONSES TO PHQ QUESTIONS 1-9: 0
2. FEELING DOWN, DEPRESSED OR HOPELESS: NOT AT ALL
SUM OF ALL RESPONSES TO PHQ QUESTIONS 1-9: 0

## 2025-08-06 ASSESSMENT — LIFESTYLE VARIABLES
HOW OFTEN DO YOU HAVE A DRINK CONTAINING ALCOHOL: MONTHLY OR LESS
HOW MANY STANDARD DRINKS CONTAINING ALCOHOL DO YOU HAVE ON A TYPICAL DAY: 1 OR 2

## 2025-08-11 ENCOUNTER — HOSPITAL ENCOUNTER (OUTPATIENT)
Age: 75
Discharge: HOME | End: 2025-08-11
Payer: MEDICARE

## 2025-08-11 DIAGNOSIS — E78.2 MIXED HYPERLIPIDEMIA: ICD-10-CM

## 2025-08-11 DIAGNOSIS — I10 ESSENTIAL HYPERTENSION: ICD-10-CM

## 2025-08-11 LAB
ALBUMIN SERPL-MCNC: 4.4 G/DL (ref 3.5–5.2)
ALP SERPL-CCNC: 77 U/L (ref 35–104)
ALT SERPL-CCNC: 16 U/L (ref 10–35)
ANION GAP SERPL CALCULATED.3IONS-SCNC: 13 MMOL/L (ref 9–16)
AST SERPL-CCNC: 20 U/L (ref 10–35)
BASOPHILS # BLD: <0.03 K/UL (ref 0–0.2)
BASOPHILS NFR BLD: 0 % (ref 0–2)
BILIRUB SERPL-MCNC: 0.6 MG/DL (ref 0–1.2)
BUN SERPL-MCNC: 10 MG/DL (ref 8–23)
CALCIUM SERPL-MCNC: 8.7 MG/DL (ref 8.6–10.4)
CHLORIDE SERPL-SCNC: 104 MMOL/L (ref 98–107)
CHOLEST SERPL-MCNC: 183 MG/DL (ref 0–199)
CHOLESTEROL/HDL RATIO: 3.7
CO2 SERPL-SCNC: 24 MMOL/L (ref 20–31)
CREAT SERPL-MCNC: 0.7 MG/DL (ref 0.7–1.2)
EOSINOPHIL # BLD: 0.17 K/UL (ref 0–0.44)
EOSINOPHILS RELATIVE PERCENT: 3 % (ref 0–4)
ERYTHROCYTE [DISTWIDTH] IN BLOOD BY AUTOMATED COUNT: 12.5 % (ref 11.5–14.9)
GFR, ESTIMATED: 90 ML/MIN/1.73M2
GLUCOSE SERPL-MCNC: 116 MG/DL (ref 74–99)
HCT VFR BLD AUTO: 37.8 % (ref 36–46)
HDLC SERPL-MCNC: 49 MG/DL
HGB BLD-MCNC: 12.7 G/DL (ref 12–16)
IMM GRANULOCYTES # BLD AUTO: <0.03 K/UL (ref 0–0.3)
IMM GRANULOCYTES NFR BLD: 0 %
LDLC SERPL CALC-MCNC: 118 MG/DL (ref 0–100)
LYMPHOCYTES NFR BLD: 1.33 K/UL (ref 1.1–3.7)
LYMPHOCYTES RELATIVE PERCENT: 27 % (ref 24–44)
MCH RBC QN AUTO: 31.3 PG (ref 26–34)
MCHC RBC AUTO-ENTMCNC: 33.6 G/DL (ref 31–37)
MCV RBC AUTO: 93.1 FL (ref 80–100)
MONOCYTES NFR BLD: 0.48 K/UL (ref 0.1–1.2)
MONOCYTES NFR BLD: 10 % (ref 3–12)
NEUTROPHILS NFR BLD: 60 % (ref 36–66)
NEUTS SEG NFR BLD: 2.96 K/UL (ref 1.5–8.1)
NRBC BLD-RTO: 0 PER 100 WBC
PLATELET # BLD AUTO: 190 K/UL (ref 150–450)
PMV BLD AUTO: 11.2 FL (ref 8–13.5)
POTASSIUM SERPL-SCNC: 3.8 MMOL/L (ref 3.7–5.3)
PROT SERPL-MCNC: 6.4 G/DL (ref 6.6–8.7)
RBC # BLD AUTO: 4.06 M/UL (ref 3.95–5.11)
SODIUM SERPL-SCNC: 141 MMOL/L (ref 136–145)
TRIGL SERPL-MCNC: 80 MG/DL (ref 0–149)
URATE SERPL-MCNC: 4.7 MG/DL (ref 2.4–5.7)
WBC OTHER # BLD: 5 K/UL (ref 3.5–11)

## 2025-08-11 PROCEDURE — 80053 COMPREHEN METABOLIC PANEL: CPT

## 2025-08-11 PROCEDURE — 36415 COLL VENOUS BLD VENIPUNCTURE: CPT

## 2025-08-11 PROCEDURE — 85025 COMPLETE CBC W/AUTO DIFF WBC: CPT

## 2025-08-11 PROCEDURE — 80061 LIPID PANEL: CPT

## 2025-08-11 PROCEDURE — 84550 ASSAY OF BLOOD/URIC ACID: CPT

## (undated) DEVICE — DRAPE,REIN 53X77,STERILE: Brand: MEDLINE

## (undated) DEVICE — SUTURE MCRYL SZ 4-0 L18IN ABSRB UD L16MM PC-3 3/8 CIR PRIM Y845G

## (undated) DEVICE — INSTRUMENT TRACKER 9733533XOM ENT 1PK

## (undated) DEVICE — SHEET, ORTHO, SPLIT, STERILE: Brand: MEDLINE

## (undated) DEVICE — Device

## (undated) DEVICE — PLUMEPORT SEO LAPAROSCOPIC SMOKE FILTRATION DEVICE: Brand: PLUMEPORT

## (undated) DEVICE — GAUZE,SPONGE,4"X4",16PLY,XRAY,STRL,LF: Brand: MEDLINE

## (undated) DEVICE — BLANKET WRM W40.2XL55.9IN IORT LO BODY + MISTRAL AIR

## (undated) DEVICE — ARM DRAPE

## (undated) DEVICE — SNARE ENDOSCP L240CM LOOP W13MM DIA2.4MM SHT THROW SM OVL

## (undated) DEVICE — BLADE 1884080EM TRICUT 4MMX13CM M4 ROHS: Brand: FUSION®

## (undated) DEVICE — TUBING, SUCTION, 1/4" X 12', STRAIGHT: Brand: MEDLINE

## (undated) DEVICE — APPLICATOR MEDICATED 26 CC SOLUTION HI LT ORNG CHLORAPREP

## (undated) DEVICE — SOLUTION ANTIFOG VIS SYS CLEARIFY LAPSCP

## (undated) DEVICE — SYRINGE MED 10ML TRNSLUC BRL PLUNG BLK MRK POLYPR CTRL

## (undated) DEVICE — DEVICE TRCR 12X9X3IN WHT CLSR DISP OMNICLOSE

## (undated) DEVICE — PAD N ADH W3XL4IN POLY COT SFT PERF FLM EASILY CUT ABSRB

## (undated) DEVICE — SPLINT 1524055 DOYLE II AIRWAY SET: Brand: DOYLE II ™

## (undated) DEVICE — SUTURE VCRL SZ 0 L36IN ABSRB UD L36MM CT-1 1/2 CIR J946H

## (undated) DEVICE — CANNULA SEAL

## (undated) DEVICE — REFLEX ULTRA 45 WITH INTEGRATED CABLE: Brand: COBLATION

## (undated) DEVICE — GOWN,AURORA,NONREINFORCED,LARGE: Brand: MEDLINE

## (undated) DEVICE — GOWN,AURORA,NON-REINFORCED,2XL: Brand: MEDLINE

## (undated) DEVICE — WIPE INSTR W73XL73CM VISITEC

## (undated) DEVICE — PATIENT TRACKER 9734887XOM NON-INVASIVE

## (undated) DEVICE — MARKER,SKIN,WI/RULER AND LABELS: Brand: MEDLINE

## (undated) DEVICE — MINOR BSIN PK

## (undated) DEVICE — GLOVE ORANGE PI 7   MSG9070

## (undated) DEVICE — SUTURE SZ 0 27IN 5/8 CIR UR-6  TAPER PT VIOLET ABSRB VICRYL J603H

## (undated) DEVICE — GLOVE SURG SZ 65 THK91MIL LTX FREE SYN POLYISOPRENE

## (undated) DEVICE — SCISSOR SURG METZ CRV TIP

## (undated) DEVICE — CODMAN® SURGICAL PATTIES 1/2" X 3" (1.27CM X 7.62CM): Brand: CODMAN®

## (undated) DEVICE — ADHESIVE SKIN CLSR 0.7ML TOP DERMBND ADV

## (undated) DEVICE — SPONGE GZ W2XL2IN NONWOVEN 4 PLY FASTER WICKING ABIL AVANT

## (undated) DEVICE — COVER LT HNDL BLU PLAS

## (undated) DEVICE — SOLUTION IRRIG 500ML 0.9% SOD CHLO USP POUR PLAS BTL

## (undated) DEVICE — KIT,ANTI FOG,W/SPONGE & FLUID,SOFT PACK: Brand: MEDLINE

## (undated) DEVICE — SYRINGES CONTROL 10ML W/ROTATOR

## (undated) DEVICE — TOWEL,OR,DSP,ST,NATURAL,DLX,4/PK,20PK/CS: Brand: MEDLINE

## (undated) DEVICE — SOCK SPEC L9IN WHT UNIV W/ STD PRT FOR FLD MGMT

## (undated) DEVICE — BLADE 1884380EM QUADCUT 4.3MMX13CM ROHS: Brand: ROTATABLE FUSION®

## (undated) DEVICE — FIRM 8CM: Brand: NASOPORE

## (undated) DEVICE — BLADELESS OBTURATOR: Brand: WECK VISTA

## (undated) DEVICE — MEDICINE CUP, GRADUATED, STER: Brand: MEDLINE

## (undated) DEVICE — TROCAR: Brand: KII FIOS FIRST ENTRY

## (undated) DEVICE — HYPODERMIC SAFETY NEEDLE: Brand: MAGELLAN

## (undated) DEVICE — ANES EXTENSION SET 90IN-LF: Brand: MEDLINE INDUSTRIES, INC.

## (undated) DEVICE — ENDO KIT W/SYRINGE: Brand: MEDLINE INDUSTRIES, INC.

## (undated) DEVICE — POSITIONER HD W8XH4XL8.5IN RASPBERRY FOAM SLT

## (undated) DEVICE — CODMAN® SURGICAL PATTIES 1/2" X 1/2" (1.27CM X 1.27CM): Brand: CODMAN®

## (undated) DEVICE — TUBING 1895522 5PK STRAIGHTSHOT TO XPS: Brand: STRAIGHTSHOT®

## (undated) DEVICE — DEFENDO AIR WATER SUCTION AND BIOPSY VALVE KIT FOR  OLYMPUS: Brand: DEFENDO AIR/WATER/SUCTION AND BIOPSY VALVE

## (undated) DEVICE — INSUFFLATION TUBING SET WITH FILTER, FUNNEL CONNECTOR AND LUER LOCK: Brand: JOSNOE MEDICAL INC

## (undated) DEVICE — GLOVE SURG 9 PF CRM STRL SENSICARE PI MIC LF

## (undated) DEVICE — PROTECTOR ULN NRV PUR FOAM HK LOOP STRP ANATOMICALLY

## (undated) DEVICE — TISSUE RETRIEVAL SYSTEM: Brand: INZII RETRIEVAL SYSTEM

## (undated) DEVICE — TOWEL,OR,DSP,ST,BLUE,DLX,XR,4/PK,20PK/CS: Brand: MEDLINE

## (undated) DEVICE — CLIP INT L POLYMER LOK LIG HEM O LOK